# Patient Record
Sex: MALE | Race: OTHER | HISPANIC OR LATINO | URBAN - METROPOLITAN AREA
[De-identification: names, ages, dates, MRNs, and addresses within clinical notes are randomized per-mention and may not be internally consistent; named-entity substitution may affect disease eponyms.]

---

## 2018-09-15 ENCOUNTER — EMERGENCY (EMERGENCY)
Facility: HOSPITAL | Age: 83
LOS: 1 days | Discharge: HOME | End: 2018-09-15
Attending: EMERGENCY MEDICINE
Payer: MEDICARE

## 2018-09-15 VITALS
DIASTOLIC BLOOD PRESSURE: 78 MMHG | WEIGHT: 145.06 LBS | TEMPERATURE: 99 F | OXYGEN SATURATION: 98 % | SYSTOLIC BLOOD PRESSURE: 137 MMHG | RESPIRATION RATE: 16 BRPM | HEIGHT: 65 IN | HEART RATE: 100 BPM

## 2018-09-15 VITALS
DIASTOLIC BLOOD PRESSURE: 74 MMHG | RESPIRATION RATE: 18 BRPM | HEART RATE: 96 BPM | SYSTOLIC BLOOD PRESSURE: 129 MMHG | OXYGEN SATURATION: 98 % | TEMPERATURE: 98 F

## 2018-09-15 PROCEDURE — 73110 X-RAY EXAM OF WRIST: CPT | Mod: 26,RT

## 2018-09-15 PROCEDURE — 73110 X-RAY EXAM OF WRIST: CPT

## 2018-09-15 PROCEDURE — 73130 X-RAY EXAM OF HAND: CPT

## 2018-09-15 PROCEDURE — 99285 EMERGENCY DEPT VISIT HI MDM: CPT | Mod: 25

## 2018-09-15 PROCEDURE — 73130 X-RAY EXAM OF HAND: CPT | Mod: 26,RT

## 2018-09-15 PROCEDURE — 94640 AIRWAY INHALATION TREATMENT: CPT

## 2018-09-15 PROCEDURE — 99285 EMERGENCY DEPT VISIT HI MDM: CPT

## 2018-09-15 RX ORDER — IPRATROPIUM/ALBUTEROL SULFATE 18-103MCG
3 AEROSOL WITH ADAPTER (GRAM) INHALATION
Qty: 0 | Refills: 0 | Status: COMPLETED | OUTPATIENT
Start: 2018-09-15 | End: 2018-09-15

## 2018-09-15 RX ORDER — IBUPROFEN 200 MG
400 TABLET ORAL ONCE
Qty: 0 | Refills: 0 | Status: COMPLETED | OUTPATIENT
Start: 2018-09-15 | End: 2018-09-15

## 2018-09-15 RX ORDER — KETOROLAC TROMETHAMINE 30 MG/ML
30 SYRINGE (ML) INJECTION ONCE
Qty: 0 | Refills: 0 | Status: DISCONTINUED | OUTPATIENT
Start: 2018-09-15 | End: 2018-09-15

## 2018-09-15 RX ADMIN — Medication 3 MILLILITER(S): at 10:42

## 2018-09-15 RX ADMIN — Medication 400 MILLIGRAM(S): at 10:42

## 2018-09-15 RX ADMIN — Medication 40 MILLIGRAM(S): at 10:42

## 2018-09-15 RX ADMIN — Medication 3 MILLILITER(S): at 10:30

## 2018-09-15 RX ADMIN — Medication 400 MILLIGRAM(S): at 11:05

## 2018-09-15 RX ADMIN — Medication 3 MILLILITER(S): at 10:15

## 2018-09-15 NOTE — ED PROVIDER NOTE - PROGRESS NOTE DETAILS
pt claims feeling much better, Lungs- clear, Rt hand- NT to palp., d/w Dr. Crooks, will d/c to [facility]

## 2018-09-15 NOTE — ED PROVIDER NOTE - MUSCULOSKELETAL, MLM
Spine appears normal, range of motion is not limited, Rt hand- dorsal prox lat MTP-swelling, tenderness, warm to touch, mild erythema

## 2018-09-15 NOTE — ED ADULT NURSE NOTE - NSIMPLEMENTINTERV_GEN_ALL_ED
Implemented All Fall Risk Interventions:  [place] to call system. Call bell, personal items and telephone within reach. Instruct patient to call for assistance. Room bathroom lighting operational. Non-slip footwear when patient is off stretcher. Physically safe environment: no spills, clutter or unnecessary equipment. Stretcher in lowest position, wheels locked, appropriate side rails in place. Provide visual cue, wrist band, yellow gown, etc. Monitor gait and stability. Monitor for mental status changes and reorient to person, place, and time. Review medications for side effects contributing to fall risk. Reinforce activity limits and safety measures with patient and family.

## 2018-09-15 NOTE — ED PROVIDER NOTE - PMH
Anemia    Dementia    Depression    Diabetes mellitus    HTN (hypertension)    Osteoarthritis    Osteoporosis    Seizure

## 2018-10-11 ENCOUNTER — INPATIENT (INPATIENT)
Facility: HOSPITAL | Age: 83
LOS: 3 days | Discharge: NURSING, REHAB OR LONG-TERM CARE | DRG: 194 | End: 2018-10-15
Attending: INTERNAL MEDICINE | Admitting: INTERNAL MEDICINE
Payer: MEDICARE

## 2018-10-11 VITALS
OXYGEN SATURATION: 100 % | HEART RATE: 101 BPM | HEIGHT: 68 IN | TEMPERATURE: 98 F | SYSTOLIC BLOOD PRESSURE: 124 MMHG | DIASTOLIC BLOOD PRESSURE: 82 MMHG | WEIGHT: 149.91 LBS | RESPIRATION RATE: 22 BRPM

## 2018-10-11 DIAGNOSIS — R07.9 CHEST PAIN, UNSPECIFIED: ICD-10-CM

## 2018-10-11 DIAGNOSIS — D64.9 ANEMIA, UNSPECIFIED: ICD-10-CM

## 2018-10-11 DIAGNOSIS — R56.9 UNSPECIFIED CONVULSIONS: ICD-10-CM

## 2018-10-11 DIAGNOSIS — I10 ESSENTIAL (PRIMARY) HYPERTENSION: ICD-10-CM

## 2018-10-11 DIAGNOSIS — Z29.9 ENCOUNTER FOR PROPHYLACTIC MEASURES, UNSPECIFIED: ICD-10-CM

## 2018-10-11 DIAGNOSIS — E11.9 TYPE 2 DIABETES MELLITUS WITHOUT COMPLICATIONS: ICD-10-CM

## 2018-10-11 LAB
ALBUMIN SERPL ELPH-MCNC: 3 G/DL — LOW (ref 3.5–5)
ALP SERPL-CCNC: 50 U/L — SIGNIFICANT CHANGE UP (ref 40–120)
ALT FLD-CCNC: 27 U/L DA — SIGNIFICANT CHANGE UP (ref 10–60)
ANION GAP SERPL CALC-SCNC: 6 MMOL/L — SIGNIFICANT CHANGE UP (ref 5–17)
APTT BLD: 23.8 SEC — LOW (ref 27.5–37.4)
AST SERPL-CCNC: 25 U/L — SIGNIFICANT CHANGE UP (ref 10–40)
BILIRUB SERPL-MCNC: 0.4 MG/DL — SIGNIFICANT CHANGE UP (ref 0.2–1.2)
BUN SERPL-MCNC: 19 MG/DL — HIGH (ref 7–18)
CALCIUM SERPL-MCNC: 8.8 MG/DL — SIGNIFICANT CHANGE UP (ref 8.4–10.5)
CHLORIDE SERPL-SCNC: 102 MMOL/L — SIGNIFICANT CHANGE UP (ref 96–108)
CK MB BLD-MCNC: 0.7 % — SIGNIFICANT CHANGE UP (ref 0–3.5)
CK MB CFR SERPL CALC: 1.4 NG/ML — SIGNIFICANT CHANGE UP (ref 0–3.6)
CK SERPL-CCNC: 187 U/L — SIGNIFICANT CHANGE UP (ref 35–232)
CO2 SERPL-SCNC: 28 MMOL/L — SIGNIFICANT CHANGE UP (ref 22–31)
CREAT SERPL-MCNC: 1.12 MG/DL — SIGNIFICANT CHANGE UP (ref 0.5–1.3)
GLUCOSE BLDC GLUCOMTR-MCNC: 111 MG/DL — HIGH (ref 70–99)
GLUCOSE SERPL-MCNC: 90 MG/DL — SIGNIFICANT CHANGE UP (ref 70–99)
HCT VFR BLD CALC: 28.1 % — LOW (ref 39–50)
HGB BLD-MCNC: 9.2 G/DL — LOW (ref 13–17)
INR BLD: 1.29 RATIO — HIGH (ref 0.88–1.16)
LACTATE SERPL-SCNC: 0.8 MMOL/L — SIGNIFICANT CHANGE UP (ref 0.7–2)
MCHC RBC-ENTMCNC: 32.6 GM/DL — SIGNIFICANT CHANGE UP (ref 32–36)
MCHC RBC-ENTMCNC: 33.5 PG — SIGNIFICANT CHANGE UP (ref 27–34)
MCV RBC AUTO: 102.7 FL — HIGH (ref 80–100)
PLATELET # BLD AUTO: 171 K/UL — SIGNIFICANT CHANGE UP (ref 150–400)
POTASSIUM SERPL-MCNC: 4.3 MMOL/L — SIGNIFICANT CHANGE UP (ref 3.5–5.3)
POTASSIUM SERPL-SCNC: 4.3 MMOL/L — SIGNIFICANT CHANGE UP (ref 3.5–5.3)
PROT SERPL-MCNC: 7.5 G/DL — SIGNIFICANT CHANGE UP (ref 6–8.3)
PROTHROM AB SERPL-ACNC: 14.1 SEC — HIGH (ref 9.8–12.7)
RBC # BLD: 2.74 M/UL — LOW (ref 4.2–5.8)
RBC # FLD: 12.8 % — SIGNIFICANT CHANGE UP (ref 10.3–14.5)
SODIUM SERPL-SCNC: 136 MMOL/L — SIGNIFICANT CHANGE UP (ref 135–145)
TROPONIN I SERPL-MCNC: <0.015 NG/ML — SIGNIFICANT CHANGE UP (ref 0–0.04)
TROPONIN I SERPL-MCNC: <0.015 NG/ML — SIGNIFICANT CHANGE UP (ref 0–0.04)
WBC # BLD: 11.8 K/UL — HIGH (ref 3.8–10.5)
WBC # FLD AUTO: 11.8 K/UL — HIGH (ref 3.8–10.5)

## 2018-10-11 PROCEDURE — 71045 X-RAY EXAM CHEST 1 VIEW: CPT | Mod: 26

## 2018-10-11 PROCEDURE — 71275 CT ANGIOGRAPHY CHEST: CPT | Mod: 26

## 2018-10-11 PROCEDURE — 99285 EMERGENCY DEPT VISIT HI MDM: CPT

## 2018-10-11 RX ORDER — CEFTRIAXONE 500 MG/1
1 INJECTION, POWDER, FOR SOLUTION INTRAMUSCULAR; INTRAVENOUS ONCE
Qty: 0 | Refills: 0 | Status: COMPLETED | OUTPATIENT
Start: 2018-10-11 | End: 2018-10-11

## 2018-10-11 RX ORDER — CHOLECALCIFEROL (VITAMIN D3) 125 MCG
1000 CAPSULE ORAL DAILY
Qty: 0 | Refills: 0 | Status: DISCONTINUED | OUTPATIENT
Start: 2018-10-11 | End: 2018-10-15

## 2018-10-11 RX ORDER — CEFTRIAXONE 500 MG/1
1 INJECTION, POWDER, FOR SOLUTION INTRAMUSCULAR; INTRAVENOUS EVERY 24 HOURS
Qty: 0 | Refills: 0 | Status: DISCONTINUED | OUTPATIENT
Start: 2018-10-11 | End: 2018-10-15

## 2018-10-11 RX ORDER — INSULIN LISPRO 100/ML
VIAL (ML) SUBCUTANEOUS
Qty: 0 | Refills: 0 | Status: DISCONTINUED | OUTPATIENT
Start: 2018-10-11 | End: 2018-10-15

## 2018-10-11 RX ORDER — FOLIC ACID 0.8 MG
1 TABLET ORAL DAILY
Qty: 0 | Refills: 0 | Status: DISCONTINUED | OUTPATIENT
Start: 2018-10-11 | End: 2018-10-15

## 2018-10-11 RX ORDER — AZITHROMYCIN 500 MG/1
500 TABLET, FILM COATED ORAL ONCE
Qty: 0 | Refills: 0 | Status: COMPLETED | OUTPATIENT
Start: 2018-10-11 | End: 2018-10-11

## 2018-10-11 RX ORDER — AZITHROMYCIN 500 MG/1
500 TABLET, FILM COATED ORAL EVERY 24 HOURS
Qty: 0 | Refills: 0 | Status: DISCONTINUED | OUTPATIENT
Start: 2018-10-11 | End: 2018-10-15

## 2018-10-11 RX ORDER — ASPIRIN/CALCIUM CARB/MAGNESIUM 324 MG
81 TABLET ORAL DAILY
Qty: 0 | Refills: 0 | Status: DISCONTINUED | OUTPATIENT
Start: 2018-10-11 | End: 2018-10-15

## 2018-10-11 RX ORDER — LOSARTAN POTASSIUM 100 MG/1
25 TABLET, FILM COATED ORAL DAILY
Qty: 0 | Refills: 0 | Status: DISCONTINUED | OUTPATIENT
Start: 2018-10-11 | End: 2018-10-15

## 2018-10-11 RX ORDER — LEVETIRACETAM 250 MG/1
500 TABLET, FILM COATED ORAL
Qty: 0 | Refills: 0 | Status: DISCONTINUED | OUTPATIENT
Start: 2018-10-11 | End: 2018-10-15

## 2018-10-11 RX ORDER — SIMVASTATIN 20 MG/1
10 TABLET, FILM COATED ORAL AT BEDTIME
Qty: 0 | Refills: 0 | Status: DISCONTINUED | OUTPATIENT
Start: 2018-10-11 | End: 2018-10-15

## 2018-10-11 RX ORDER — CEFTRIAXONE 500 MG/1
1 INJECTION, POWDER, FOR SOLUTION INTRAMUSCULAR; INTRAVENOUS EVERY 24 HOURS
Qty: 0 | Refills: 0 | Status: DISCONTINUED | OUTPATIENT
Start: 2018-10-11 | End: 2018-10-12

## 2018-10-11 RX ORDER — ASPIRIN/CALCIUM CARB/MAGNESIUM 324 MG
162 TABLET ORAL ONCE
Qty: 0 | Refills: 0 | Status: COMPLETED | OUTPATIENT
Start: 2018-10-11 | End: 2018-10-11

## 2018-10-11 RX ORDER — METRONIDAZOLE 500 MG
500 TABLET ORAL EVERY 8 HOURS
Qty: 0 | Refills: 0 | Status: DISCONTINUED | OUTPATIENT
Start: 2018-10-11 | End: 2018-10-12

## 2018-10-11 RX ORDER — ALBUTEROL 90 UG/1
2 AEROSOL, METERED ORAL EVERY 6 HOURS
Qty: 0 | Refills: 0 | Status: DISCONTINUED | OUTPATIENT
Start: 2018-10-11 | End: 2018-10-15

## 2018-10-11 RX ORDER — FERROUS SULFATE 325(65) MG
325 TABLET ORAL DAILY
Qty: 0 | Refills: 0 | Status: DISCONTINUED | OUTPATIENT
Start: 2018-10-11 | End: 2018-10-15

## 2018-10-11 RX ADMIN — AZITHROMYCIN 500 MILLIGRAM(S): 500 TABLET, FILM COATED ORAL at 17:37

## 2018-10-11 RX ADMIN — Medication 100 MILLIGRAM(S): at 23:12

## 2018-10-11 RX ADMIN — SIMVASTATIN 10 MILLIGRAM(S): 20 TABLET, FILM COATED ORAL at 23:13

## 2018-10-11 RX ADMIN — Medication 162 MILLIGRAM(S): at 11:54

## 2018-10-11 RX ADMIN — CEFTRIAXONE 100 GRAM(S): 500 INJECTION, POWDER, FOR SOLUTION INTRAMUSCULAR; INTRAVENOUS at 17:37

## 2018-10-11 NOTE — ED ADULT NURSE NOTE - CHIEF COMPLAINT QUOTE
BIBA by ambulance from [facility] with c/o chest pain with c/o chest pain radiating to left arm with shortness of breath since morning.

## 2018-10-11 NOTE — ED ADULT NURSE NOTE - NSIMPLEMENTINTERV_GEN_ALL_ED
Implemented All Fall Risk Interventions:  [place] to call system. Call bell, personal items and telephone within reach. Instruct patient to call for assistance. Room bathroom lighting operational. Non-slip footwear when patient is off stretcher. Physically safe environment: no spills, clutter or unnecessary equipment. Stretcher in lowest position, wheels locked, appropriate side rails in place. Provide visual cue, wrist band, yellow gown, etc. Monitor gait and stability. Monitor for mental status changes and reorient to person, place, and time. Review medications for side effects contributing to fall risk. Reinforce activity limits and safety measures with patient and family.

## 2018-10-11 NOTE — ED PROVIDER NOTE - SEVERE SEPSIS ALERT DETAILS
will send blood cx and lactate given that pt has poss pna on ct scan though likely not pna clinically. abx coverage ordered.

## 2018-10-11 NOTE — H&P ADULT - COMMENTS
I called patient with results of strep culture of throat.  It was also negative. He is a little better but not a lot.  I told him that he should be much better with the antibiotics if it was strep, so it is probably a viral infection.  He should stop the antibiotics and continue with supportive care as recommended before.    Unable to obtain 2/2 mental status

## 2018-10-11 NOTE — ED ADULT NURSE NOTE - OBJECTIVE STATEMENT
Pt was sent from [facility] for c/o chest pain, radiating to Left arm since this morning, and difficulty breathing

## 2018-10-11 NOTE — H&P ADULT - NSHPPHYSICALEXAM_GEN_ALL_CORE
T(C): 36.7 (11 Oct 2018 15:56), Max: 36.7 (11 Oct 2018 15:56)  T(F): 98 (11 Oct 2018 15:56), Max: 98 (11 Oct 2018 15:56)  HR: 102 (11 Oct 2018 15:56) (101 - 102)  BP: 122/80 (11 Oct 2018 15:56) (122/80 - 124/82)  RR: 20 (11 Oct 2018 15:56) (20 - 22)  SpO2: 99% (11 Oct 2018 15:56) (99% - 100%)

## 2018-10-11 NOTE — ED PROVIDER NOTE - OBJECTIVE STATEMENT
82 y/o M with PMHx of anemia, dementia, depression, DM, HTN and no significant PSHx presents to the ED from [facility] with complaints of left sided chest pain. Patient is a [place] speaker and  phone was utilized. Challenging to obtain history as patient with questionable dysarthria. Patient with intermittent left sided chest pain x 1 day. Patient was observed to be coughing. Patient was brought on O2 by EMS. As per report from EMS patient with chest pain to rule out ACS. NKDA.

## 2018-10-11 NOTE — H&P ADULT - ASSESSMENT
83 year old  male w/ PMH of Dementia, COPD, HTN, PVD, T2DM, and Urinary Incontinence presents from [facility] for further evaluation of chest pain and reported LUE pain - patient poor historian, [place] (but usually knows where, unable to provider further HPI. In the ED patient seen in NAD coughing nonproductive at bedside - ED w/u included CTA negative for PE but noted b/l upper lobes, lingula and RML opacities concerning for infectious cause and XR of the LUE negative for acute fracture. Patient received dose of Azithromycin and Ceftriaxone along w/ 162 mg of ASA. Spoke w/ HCP Akiko Moon - updated about patient's status; DNR but okay with intubation. Family denied Hx of TB infection and came from [facility] years ago -  phone number . 83 year old  male w/ PMH of Dementia, COPD, HTN, PVD, T2DM, and Urinary Incontinence presents from [facility] for further evaluation of chest pain and reported LUE pain - admitting for further evaluation w/ differentials including PNA, ACS, and malignancy

## 2018-10-11 NOTE — ED ADULT TRIAGE NOTE - CHIEF COMPLAINT QUOTE
BIBA by ambulance from [facility] with c/o chest pain with c/o chest pain radiating to left arm with shortness of breath since morning.

## 2018-10-11 NOTE — H&P ADULT - HISTORY OF PRESENT ILLNESS
83 year old  male w/ PMH of Dementia, COPD, HTN, PVD, T2DM, and Urinary Incontinence presents from [facility] for further evaluation of chest pain and reported LUE pain - patient poor historian, [place] (but usually knows where, unable to provider further HPI. In the ED patient seen in NAD coughing nonproductive at bedside - ED w/u included CTA negative for PE but noted b/l upper lobes, lingula and RML opacities concerning for infectious cause and XR of the LUE negative for acute fracture. Patient received dose of Azithromycin and Ceftriaxone along w/ 162 mg of ASA. Spoke w/ HCP Akiko Moon - updated about patient's status; DNR but okay with intubation. Family denied Hx of TB infection and came from [facility] 40 years ago -  phone number .

## 2018-10-11 NOTE — H&P ADULT - PROBLEM SELECTOR PLAN 1
Differentials include ACS versus PNA  - Afebrile, mild WBC elevation, no focal findings on examination  - CTA negative for PE but shows b/l upper lobe nodules concerning for infection  - EKG w/ NSR w/ RBBB and TWI at V1-3 (similar to EKG 2016) and Troponin negative x 1; C/w ASA, Statin, and low dose beta blocker  Cardiology Dr Ordonez  Pulmonary Dr Marcelino  ID Dr Monson  ***F/u serial cardiac enzymes, TTE, procalcitonin, and further recommendations pending above

## 2018-10-12 DIAGNOSIS — I71.4 ABDOMINAL AORTIC ANEURYSM, WITHOUT RUPTURE: ICD-10-CM

## 2018-10-12 LAB
ANION GAP SERPL CALC-SCNC: 10 MMOL/L — SIGNIFICANT CHANGE UP (ref 5–17)
ANISOCYTOSIS BLD QL: SLIGHT — SIGNIFICANT CHANGE UP
BASOPHILS NFR BLD AUTO: 1 % — SIGNIFICANT CHANGE UP (ref 0–2)
BUN SERPL-MCNC: 18 MG/DL — SIGNIFICANT CHANGE UP (ref 7–18)
CALCIUM SERPL-MCNC: 8.9 MG/DL — SIGNIFICANT CHANGE UP (ref 8.4–10.5)
CHLORIDE SERPL-SCNC: 103 MMOL/L — SIGNIFICANT CHANGE UP (ref 96–108)
CHOLEST SERPL-MCNC: 87 MG/DL — SIGNIFICANT CHANGE UP (ref 10–199)
CK MB BLD-MCNC: 0.9 % — SIGNIFICANT CHANGE UP (ref 0–3.5)
CK MB CFR SERPL CALC: 1.5 NG/ML — SIGNIFICANT CHANGE UP (ref 0–3.6)
CK SERPL-CCNC: 170 U/L — SIGNIFICANT CHANGE UP (ref 35–232)
CO2 SERPL-SCNC: 24 MMOL/L — SIGNIFICANT CHANGE UP (ref 22–31)
CREAT SERPL-MCNC: 0.97 MG/DL — SIGNIFICANT CHANGE UP (ref 0.5–1.3)
FERRITIN SERPL-MCNC: 540 NG/ML — HIGH (ref 30–400)
FOLATE SERPL-MCNC: >20 NG/ML — SIGNIFICANT CHANGE UP
GLUCOSE BLDC GLUCOMTR-MCNC: 108 MG/DL — HIGH (ref 70–99)
GLUCOSE BLDC GLUCOMTR-MCNC: 115 MG/DL — HIGH (ref 70–99)
GLUCOSE BLDC GLUCOMTR-MCNC: 126 MG/DL — HIGH (ref 70–99)
GLUCOSE BLDC GLUCOMTR-MCNC: 99 MG/DL — SIGNIFICANT CHANGE UP (ref 70–99)
GLUCOSE SERPL-MCNC: 95 MG/DL — SIGNIFICANT CHANGE UP (ref 70–99)
HBA1C BLD-MCNC: 6 % — HIGH (ref 4–5.6)
HCT VFR BLD CALC: 27.7 % — LOW (ref 39–50)
HDLC SERPL-MCNC: 35 MG/DL — LOW
HGB BLD-MCNC: 9.1 G/DL — LOW (ref 13–17)
IRON SATN MFR SERPL: 18 % — LOW (ref 20–55)
IRON SATN MFR SERPL: 31 UG/DL — LOW (ref 65–170)
LIPID PNL WITH DIRECT LDL SERPL: 40 MG/DL — SIGNIFICANT CHANGE UP
LYMPHOCYTES # BLD AUTO: 24 % — SIGNIFICANT CHANGE UP (ref 13–44)
MAGNESIUM SERPL-MCNC: 2.5 MG/DL — SIGNIFICANT CHANGE UP (ref 1.6–2.6)
MCHC RBC-ENTMCNC: 32.9 GM/DL — SIGNIFICANT CHANGE UP (ref 32–36)
MCHC RBC-ENTMCNC: 33.6 PG — SIGNIFICANT CHANGE UP (ref 27–34)
MCV RBC AUTO: 102.2 FL — HIGH (ref 80–100)
METAMYELOCYTES # FLD: 1 % — HIGH (ref 0–0)
MICROCYTES BLD QL: SLIGHT — SIGNIFICANT CHANGE UP
MONOCYTES NFR BLD AUTO: 21 % — HIGH (ref 2–14)
MYELOCYTES NFR BLD: 1 % — HIGH (ref 0–0)
NEUTROPHILS NFR BLD AUTO: 44 % — SIGNIFICANT CHANGE UP (ref 43–77)
NEUTS BAND # BLD: 6 % — SIGNIFICANT CHANGE UP (ref 0–8)
OVALOCYTES BLD QL SMEAR: SLIGHT — SIGNIFICANT CHANGE UP
PHOSPHATE SERPL-MCNC: 3.5 MG/DL — SIGNIFICANT CHANGE UP (ref 2.5–4.5)
PLAT MORPH BLD: NORMAL — SIGNIFICANT CHANGE UP
PLATELET # BLD AUTO: 177 K/UL — SIGNIFICANT CHANGE UP (ref 150–400)
POIKILOCYTOSIS BLD QL AUTO: SLIGHT — SIGNIFICANT CHANGE UP
POLYCHROMASIA BLD QL SMEAR: SLIGHT — SIGNIFICANT CHANGE UP
POTASSIUM SERPL-MCNC: 4 MMOL/L — SIGNIFICANT CHANGE UP (ref 3.5–5.3)
POTASSIUM SERPL-SCNC: 4 MMOL/L — SIGNIFICANT CHANGE UP (ref 3.5–5.3)
PROCALCITONIN SERPL-MCNC: 0.12 NG/ML — HIGH (ref 0.02–0.1)
RBC # BLD: 2.71 M/UL — LOW (ref 4.2–5.8)
RBC # FLD: 12.7 % — SIGNIFICANT CHANGE UP (ref 10.3–14.5)
RBC BLD AUTO: ABNORMAL
SODIUM SERPL-SCNC: 137 MMOL/L — SIGNIFICANT CHANGE UP (ref 135–145)
SPHEROCYTES BLD QL SMEAR: SLIGHT — SIGNIFICANT CHANGE UP
TIBC SERPL-MCNC: 175 UG/DL — LOW (ref 250–450)
TOTAL CHOLESTEROL/HDL RATIO MEASUREMENT: 2.5 RATIO — LOW (ref 3.4–9.6)
TRIGL SERPL-MCNC: 58 MG/DL — SIGNIFICANT CHANGE UP (ref 10–149)
TROPONIN I SERPL-MCNC: <0.015 NG/ML — SIGNIFICANT CHANGE UP (ref 0–0.04)
TSH SERPL-MCNC: 1.04 UU/ML — SIGNIFICANT CHANGE UP (ref 0.34–4.82)
UIBC SERPL-MCNC: 144 UG/DL — SIGNIFICANT CHANGE UP (ref 110–370)
VARIANT LYMPHS # BLD: 2 % — SIGNIFICANT CHANGE UP (ref 0–6)
VIT B12 SERPL-MCNC: 803 PG/ML — SIGNIFICANT CHANGE UP (ref 232–1245)
WBC # BLD: 9.1 K/UL — SIGNIFICANT CHANGE UP (ref 3.8–10.5)
WBC # FLD AUTO: 9.1 K/UL — SIGNIFICANT CHANGE UP (ref 3.8–10.5)

## 2018-10-12 PROCEDURE — 93306 TTE W/DOPPLER COMPLETE: CPT | Mod: 26

## 2018-10-12 PROCEDURE — 99221 1ST HOSP IP/OBS SF/LOW 40: CPT

## 2018-10-12 RX ORDER — METOPROLOL TARTRATE 50 MG
25 TABLET ORAL DAILY
Qty: 0 | Refills: 0 | Status: DISCONTINUED | OUTPATIENT
Start: 2018-10-12 | End: 2018-10-15

## 2018-10-12 RX ADMIN — Medication 81 MILLIGRAM(S): at 12:22

## 2018-10-12 RX ADMIN — LOSARTAN POTASSIUM 25 MILLIGRAM(S): 100 TABLET, FILM COATED ORAL at 06:13

## 2018-10-12 RX ADMIN — Medication 10 MILLIGRAM(S): at 12:22

## 2018-10-12 RX ADMIN — CEFTRIAXONE 100 GRAM(S): 500 INJECTION, POWDER, FOR SOLUTION INTRAMUSCULAR; INTRAVENOUS at 17:53

## 2018-10-12 RX ADMIN — LEVETIRACETAM 500 MILLIGRAM(S): 250 TABLET, FILM COATED ORAL at 06:13

## 2018-10-12 RX ADMIN — SIMVASTATIN 10 MILLIGRAM(S): 20 TABLET, FILM COATED ORAL at 21:19

## 2018-10-12 RX ADMIN — Medication 100 MILLIGRAM(S): at 06:13

## 2018-10-12 RX ADMIN — Medication 325 MILLIGRAM(S): at 12:22

## 2018-10-12 RX ADMIN — Medication 25 MILLIGRAM(S): at 21:19

## 2018-10-12 RX ADMIN — AZITHROMYCIN 250 MILLIGRAM(S): 500 TABLET, FILM COATED ORAL at 17:54

## 2018-10-12 RX ADMIN — Medication 1 MILLIGRAM(S): at 12:22

## 2018-10-12 RX ADMIN — LEVETIRACETAM 500 MILLIGRAM(S): 250 TABLET, FILM COATED ORAL at 17:53

## 2018-10-12 RX ADMIN — Medication 1000 UNIT(S): at 12:22

## 2018-10-12 NOTE — CONSULT NOTE ADULT - SUBJECTIVE AND OBJECTIVE BOX
Vascular Surgery Consultation Note    Patient is a 83y old  Male who presents with a chief complaint of chest pain and cough (12 Oct 2018 10:46)      HPI:  83 year old  M PMH of Dementia, COPD, HTN, PVD, T2DM, and Urinary Incontinence admitted for chest pain and reported LUE pain, found to have infrarenal abdominal aortic aneursym on CT.  Pt had CT in 2016 showing same aneursym which was 3.6cm at the time and now 4.2cm. Pt is poor historian and unable to provide history.  Pt denies abdominal pain, pain in bilateral lower extremities. PT unable to provide further history.      PAST MEDICAL & SURGICAL HISTORY:  Diabetes mellitus  Osteoporosis  Osteoarthritis  HTN (hypertension)  Depression  Anemia  Seizure  Dementia  No significant past surgical history      Allergies    No Known Allergies    MEDICATIONS  (STANDING):  aspirin enteric coated 81 milliGRAM(s) Oral daily  azithromycin  IVPB 500 milliGRAM(s) IV Intermittent every 24 hours  cefTRIAXone   IVPB 1 Gram(s) IV Intermittent every 24 hours  cholecalciferol 1000 Unit(s) Oral daily  ferrous    sulfate 325 milliGRAM(s) Oral daily  folic acid 1 milliGRAM(s) Oral daily  insulin lispro (HumaLOG) corrective regimen sliding scale   SubCutaneous Before meals and at bedtime  levETIRAcetam 500 milliGRAM(s) Oral two times a day  losartan 25 milliGRAM(s) Oral daily  metoprolol succinate ER 25 milliGRAM(s) Oral daily  PARoxetine 10 milliGRAM(s) Oral daily  simvastatin 10 milliGRAM(s) Oral at bedtime    MEDICATIONS  (PRN):  ALBUTerol    90 MICROgram(s) HFA Inhaler 2 Puff(s) Inhalation every 6 hours PRN Shortness of Breath and/or Wheezing      Vital Signs Last 24 Hrs  T(C): 36.9 (12 Oct 2018 16:08), Max: 37.1 (12 Oct 2018 04:48)  T(F): 98.5 (12 Oct 2018 16:08), Max: 98.8 (12 Oct 2018 04:48)  HR: 92 (12 Oct 2018 16:08) (88 - 99)  BP: 114/57 (12 Oct 2018 16:08) (109/47 - 131/56)  BP(mean): --  RR: 18 (12 Oct 2018 16:08) (18 - 20)  SpO2: 96% (12 Oct 2018 16:08) (96% - 100%)    Physical Exam:  Gen: awake, alert oriented NAD  Abd: soft NT ND, no pulsatile mass  Ext: warm to touch, no calf tenderness, no lesions on toes/foot  Vasc: good capillary refill, palpable DP/PT/popliteal/femoral pulses    Labs:                          9.1    9.1   )-----------( 177      ( 12 Oct 2018 07:10 )             27.7     10-12    137  |  103  |  18  ----------------------------<  95  4.0   |  24  |  0.97    Ca    8.9      12 Oct 2018 07:10  Phos  3.5     10-12  Mg     2.5     10-12    TPro  7.5  /  Alb  3.0<L>  /  TBili  0.4  /  DBili  x   /  AST  25  /  ALT  27  /  AlkPhos  50  10-11    PT/INR - ( 11 Oct 2018 12:34 )   PT: 14.1 sec;   INR: 1.29 ratio         PTT - ( 11 Oct 2018 12:34 )  PTT:23.8 sec      Radiological Exams:  < from: CT Angio Chest w/ IV Cont (10.11.18 @ 13:51) >  IMPRESSION:   1.  No pulmonary embolism.  2.  Bilateral upper lobe, lingular, and right middle lobe predominant   tree-in-bud opacities and consolidations, likely infectious in etiology.   Differential considerations include atypical infection such as JAMESON.  3.  Interval increase in size of partially imaged infrarenal abdominal   aortic aneurysm, which currently measures 5.2 cm, previously 4.6 cm. This   may be due to differences in technique. Consider dedicated abdominal   imaging if clinically necessary.      < end of copied text >

## 2018-10-12 NOTE — CONSULT NOTE ADULT - SUBJECTIVE AND OBJECTIVE BOX
Time of visit:    CHIEF COMPLAINT: Patient is a 83y old  Male who presents with a chief complaint of chest pain and cough (12 Oct 2018 16:21)      HPI:  83 year old  male w/ PMH of Dementia, COPD, HTN, PVD, T2DM, and Urinary Incontinence presents from [facility] for further evaluation of chest pain and reported LUE pain - patient poor historian, [place] (but usually knows where, unable to provider further HPI. In the ED patient seen in NAD coughing nonproductive at bedside - ED w/u included CTA negative for PE but noted b/l upper lobes, lingula and RML opacities concerning for infectious cause and XR of the LUE negative for acute fracture. Patient received dose of Azithromycin and Ceftriaxone along w/ 162 mg of ASA. Spoke w/ HCP Akiko Moon - updated about patient's status; DNR but okay with intubation. Family denied Hx of TB infection and came from [facility] 40 years ago -  phone number . (11 Oct 2018 17:48)   Patient seen and examined.     PAST MEDICAL & SURGICAL HISTORY:  Diabetes mellitus  Osteoporosis  Osteoarthritis  HTN (hypertension)  Depression  Anemia  Seizure  Dementia  No significant past surgical history      Allergies    No Known Allergies    Intolerances        MEDICATIONS  (STANDING):  aspirin enteric coated 81 milliGRAM(s) Oral daily  azithromycin  IVPB 500 milliGRAM(s) IV Intermittent every 24 hours  cefTRIAXone   IVPB 1 Gram(s) IV Intermittent every 24 hours  cholecalciferol 1000 Unit(s) Oral daily  ferrous    sulfate 325 milliGRAM(s) Oral daily  folic acid 1 milliGRAM(s) Oral daily  insulin lispro (HumaLOG) corrective regimen sliding scale   SubCutaneous Before meals and at bedtime  levETIRAcetam 500 milliGRAM(s) Oral two times a day  losartan 25 milliGRAM(s) Oral daily  metoprolol succinate ER 25 milliGRAM(s) Oral daily  PARoxetine 10 milliGRAM(s) Oral daily  simvastatin 10 milliGRAM(s) Oral at bedtime      MEDICATIONS  (PRN):  ALBUTerol    90 MICROgram(s) HFA Inhaler 2 Puff(s) Inhalation every 6 hours PRN Shortness of Breath and/or Wheezing   Medications up to date at time of exam.    Medications up to date at time of exam.    FAMILY HISTORY:  No pertinent family history in first degree relatives      SOCIAL HISTORY  Smoking History: [   ] smoking/smoke exposure, [   ] former smoker  Living Condition: [   ] apartment, [   ] private house  Work History:   Travel History: denies recent travel  Illicit Substance Use: denies  Alcohol Use: denies    REVIEW OF SYSTEMS:    CONSTITUTIONAL:  denies fevers, chills, sweats, weight loss    HEENT:  denies diplopia or blurred vision, sore throat or runny nose.    CARDIOVASCULAR:  denies pressure, squeezing, tightness, or heaviness about the chest; no palpitations.    RESPIRATORY:  denies SOB, cough, OLIVO, wheezing.    GASTROINTESTINAL:  denies abdominal pain, nausea, vomiting or diarrhea.    GENITOURINARY: denies dysuria, frequency or urgency.    NEUROLOGIC:  denies numbness, tingling, seizures or weakness.    PSYCHIATRIC:  denies disorder of thought or mood.    MSK: denies swelling, redness      PHYSICAL EXAMINATION:    GENERAL: The patient is a well-developed, well-nourished, in no apparent distress.     Vital Signs Last 24 Hrs  T(C): 36.9 (12 Oct 2018 16:08), Max: 37.1 (12 Oct 2018 04:48)  T(F): 98.5 (12 Oct 2018 16:08), Max: 98.8 (12 Oct 2018 04:48)  HR: 92 (12 Oct 2018 16:08) (88 - 99)  BP: 114/57 (12 Oct 2018 16:08) (109/47 - 131/56)  BP(mean): --  RR: 18 (12 Oct 2018 16:08) (18 - 20)  SpO2: 96% (12 Oct 2018 16:08) (96% - 100%)   (if applicable)    Chest Tube (if applicable)    HEENT: Head is normocephalic and atraumatic. Extraocular muscles are intact. Mucous membranes are moist.     NECK: Supple, no palpable adenopathy.    LUNGS: Clear to auscultation, no wheezing, rales, or rhonchi.    HEART: Regular rate and rhythm without murmur.    ABDOMEN: Soft, nontender, and nondistended.  No hepatosplenomegaly is noted.    RENAL: No difficulty voiding, no pelvic pain    EXTREMITIES: Without any cyanosis, clubbing, rash, lesions or edema.    NEUROLOGIC: Awake, alert, oriented, grossly intact    SKIN: Warm, dry, good turgor.      LABS:                        9.1    9.1   )-----------( 177      ( 12 Oct 2018 07:10 )             27.7     10-12    137  |  103  |  18  ----------------------------<  95  4.0   |  24  |  0.97    Ca    8.9      12 Oct 2018 07:10  Phos  3.5     10-12  Mg     2.5     10-12    TPro  7.5  /  Alb  3.0<L>  /  TBili  0.4  /  DBili  x   /  AST  25  /  ALT  27  /  AlkPhos  50  10-11    PT/INR - ( 11 Oct 2018 12:34 )   PT: 14.1 sec;   INR: 1.29 ratio         PTT - ( 11 Oct 2018 12:34 )  PTT:23.8 sec      CARDIAC MARKERS ( 12 Oct 2018 07:10 )  <0.015 ng/mL / x     / 170 U/L / x     / 1.5 ng/mL  CARDIAC MARKERS ( 11 Oct 2018 20:21 )  <0.015 ng/mL / x     / 187 U/L / x     / 1.4 ng/mL  CARDIAC MARKERS ( 11 Oct 2018 12:34 )  <0.015 ng/mL / x     / x     / x     / x              Lactate, Blood: 0.8 mmol/L (10-11-18 @ 19:18)    Procalcitonin, Serum: 0.12 ng/mL (10-12-18 @ 10:39)      MICROBIOLOGY: (if applicable)    RADIOLOGY & ADDITIONAL STUDIES:  EKG:   CXR:  ECHO:    IMPRESSION: 83y Male PAST MEDICAL & SURGICAL HISTORY:  Diabetes mellitus  Osteoporosis  Osteoarthritis  HTN (hypertension)  Depression  Anemia  Seizure  Dementia  No significant past surgical history   p/w                   RECOMMENDATIONS:

## 2018-10-12 NOTE — PROGRESS NOTE ADULT - SUBJECTIVE AND OBJECTIVE BOX
PGY1 Note discussed with supervising resident and primary attending.    Patient is a 83y old  Male who presents with a chief complaint of chest pain and cough (12 Oct 2018 10:46)      INTERVAL HPI/OVERNIGHT EVENTS: no overnight events. Patient assessed bedside, AAOx2 has no acute complaints but it is difficult to obtain ROS.    MEDICATIONS  (STANDING):  aspirin enteric coated 81 milliGRAM(s) Oral daily  azithromycin  IVPB 500 milliGRAM(s) IV Intermittent every 24 hours  cefTRIAXone   IVPB 1 Gram(s) IV Intermittent every 24 hours  cholecalciferol 1000 Unit(s) Oral daily  ferrous    sulfate 325 milliGRAM(s) Oral daily  folic acid 1 milliGRAM(s) Oral daily  insulin lispro (HumaLOG) corrective regimen sliding scale   SubCutaneous Before meals and at bedtime  levETIRAcetam 500 milliGRAM(s) Oral two times a day  losartan 25 milliGRAM(s) Oral daily  metoprolol succinate ER 25 milliGRAM(s) Oral daily  PARoxetine 10 milliGRAM(s) Oral daily  simvastatin 10 milliGRAM(s) Oral at bedtime    MEDICATIONS  (PRN):  ALBUTerol    90 MICROgram(s) HFA Inhaler 2 Puff(s) Inhalation every 6 hours PRN Shortness of Breath and/or Wheezing      Allergies    No Known Allergies    Intolerances            Vital Signs Last 24 Hrs  T(C): 36.9 (12 Oct 2018 16:08), Max: 37.1 (12 Oct 2018 04:48)  T(F): 98.5 (12 Oct 2018 16:08), Max: 98.8 (12 Oct 2018 04:48)  HR: 92 (12 Oct 2018 16:08) (88 - 99)  BP: 114/57 (12 Oct 2018 16:08) (109/47 - 131/56)  BP(mean): --  RR: 18 (12 Oct 2018 16:08) (18 - 20)  SpO2: 96% (12 Oct 2018 16:08) (96% - 100%)    PHYSICAL EXAM:  GENERAL: NAD, well-groomed, well-developed  HEAD:  Atraumatic, Normocephalic  EYES: EOMI, PERRLA, conjunctiva and sclera clear  NECK: Supple, No JVD, Normal thyroid  CHEST/LUNG: bilateral rhonchi  HEART: Regular rate and rhythm; No murmurs, rubs, or gallops  ABDOMEN: Soft, Nontender, Nondistended; Bowel sounds present  NERVOUS SYSTEM:  Alert & Oriented X2,  Motor Strength 3/5 LUE, 5/5 RUE  EXTREMITIES:  2+ Peripheral Pulses, No clubbing, cyanosis, or edema    LABS:                        9.1    9.1   )-----------( 177      ( 12 Oct 2018 07:10 )             27.7     10-12    137  |  103  |  18  ----------------------------<  95  4.0   |  24  |  0.97    Ca    8.9      12 Oct 2018 07:10  Phos  3.5     10-12  Mg     2.5     10-12    TPro  7.5  /  Alb  3.0<L>  /  TBili  0.4  /  DBili  x   /  AST  25  /  ALT  27  /  AlkPhos  50  10-11    PT/INR - ( 11 Oct 2018 12:34 )   PT: 14.1 sec;   INR: 1.29 ratio         PTT - ( 11 Oct 2018 12:34 )  PTT:23.8 sec    CAPILLARY BLOOD GLUCOSE      POCT Blood Glucose.: 126 mg/dL (12 Oct 2018 11:51)  POCT Blood Glucose.: 99 mg/dL (12 Oct 2018 07:51)  POCT Blood Glucose.: 111 mg/dL (11 Oct 2018 22:27)      RADIOLOGY & ADDITIONAL TESTS:    PROCEDURE:   CT of the Chest was performed without intravenous contrast.  Sagittal and coronal reformats were performed.      FINDINGS:    CHEST:     LUNGS AND LARGE AIRWAYS: Patent central airways. Bilateral upper lobe,   lingular, and right middle lobe predominant tree-in-bud opacities and   patchy consolidations.  PLEURA: Biapical pleural-parenchymal scarring   VESSELS: Good opacification of the pulmonary arterial tree. No pulmonary   embolism.   HEART: Heart size is normal.No pericardial effusion.  MEDIASTINUM AND FUAD: No lymphadenopathy.  CHEST WALL AND LOWER NECK: Within normal limits.  VISUALIZED UPPER ABDOMEN: Partially imaged infrarenal abdominal aortic   aneurysm measures 5.2 cm, previously 4.6 cm. Mildly thickened left   adrenal gland.  BONES: Sclerosis and cortical thickening at the posterior right eighth   rib, unchanged. Chronic fracture deformity of the left scapula. Mild   compression deformities of T3, T8, and T9, unchanged from 10/14/2016.    IMPRESSION:   1.  No pulmonary embolism.  2.  Bilateral upper lobe, lingular, and right middle lobe predominant   tree-in-bud opacities and consolidations, likely infectious in etiology.   Differential considerations include atypical infection such as JAMESON.  3.  Interval increase in size of partially imaged infrarenal abdominal   aortic aneurysm, which currently measures 5.2 cm, previously 4.6 cm. This   may be due to differences in technique. Consider dedicated abdominal   imaging if clinically necessary.

## 2018-10-12 NOTE — PROGRESS NOTE ADULT - PROBLEM SELECTOR PLAN 5
Holding home antidiabetic medications; c/w diabetic diet and ISS   A1C 6,0  -monitor FS BP acceptable; c/w Cozaar

## 2018-10-12 NOTE — CONSULT NOTE ADULT - ATTENDING COMMENTS
Seen ex'ed w PA, agree w most above.  Incidental AAA on CTC- reviewed: partial imaged AAA, inc in size from 4.8cm (2016) to 5.2cm now.  Pt poor historian.  No previous documented referrences to AAA in [place].    Appears comfortable  ABd: S/NT, no sig pulsatility  Ext's well perfused w palp periph pulses.  fem/pop pulses not widened.    A/P:   AAA  asymp  Partially imaged  Inc size since 2016  Unable to communicate w pt.  Tel message left for relative at [phone] number    Rec:   Please help assess pt's GOC's  If pt is a candidate for aggressive mgmt, then will need CTA A/P to eval options for poss AAA intervention.  Screen direct relatives for aneurysms.

## 2018-10-12 NOTE — PROGRESS NOTE ADULT - PROBLEM SELECTOR PLAN 2
H&H stable, baseline 9-10  - Anemia panel s/o AOCD CTA shows infrarenal AAA increased to 5.2cm from previous reading of 4.6cm  -vascular surgery consulted

## 2018-10-12 NOTE — CONSULT NOTE ADULT - SUBJECTIVE AND OBJECTIVE BOX
HISTORY OF PRESENT ILLNESS: HPI:  83 year old  male w/ PMH of Dementia, COPD, HTN, PVD, T2DM, and Urinary Incontinence presents from [facility] for further evaluation of chest pain and reported LUE pain - patient poor historian, [place] (but usually knows where, unable to provider further HPI. In the ED patient seen in NAD coughing nonproductive at bedside - ED w/u included CTA negative for PE but noted b/l upper lobes, lingula and RML opacities concerning for infectious cause and XR of the LUE negative for acute fracture. Patient received dose of Azithromycin and Ceftriaxone along w/ 162 mg of ASA. Spoke w/ HCP Akiko Moon - updated about patient's status; DNR but okay with intubation. Family denied Hx of TB infection and came from [place] 40 years ago -  phone number . (11 Oct 2018 17:48)  HE is currently denying CP      PAST MEDICAL & SURGICAL HISTORY:  Diabetes mellitus  Osteoporosis  Osteoarthritis  HTN (hypertension)  Depression  Anemia  Seizure  Dementia  No significant past surgical history          MEDICATIONS:  MEDICATIONS  (STANDING):  aspirin enteric coated 81 milliGRAM(s) Oral daily  azithromycin  IVPB 500 milliGRAM(s) IV Intermittent every 24 hours  cefTRIAXone   IVPB 1 Gram(s) IV Intermittent every 24 hours  cholecalciferol 1000 Unit(s) Oral daily  ferrous    sulfate 325 milliGRAM(s) Oral daily  folic acid 1 milliGRAM(s) Oral daily  insulin lispro (HumaLOG) corrective regimen sliding scale   SubCutaneous Before meals and at bedtime  levETIRAcetam 500 milliGRAM(s) Oral two times a day  losartan 25 milliGRAM(s) Oral daily  PARoxetine 10 milliGRAM(s) Oral daily  simvastatin 10 milliGRAM(s) Oral at bedtime      Allergies    No Known Allergies    Intolerances        FAMILY HISTORY:  No pertinent family history in first degree relatives    Non-contributary for premature coronary disease or sudden cardiac death    SOCIAL HISTORY:    [ ] Non-smoker  [ ] Smoker  [ ] Alcohol      REVIEW OF SYSTEMS:  [ ]chest pain  [  ]shortness of breath  [  ]palpitations  [  ]syncope  [ ]near syncope [ ]upper extremity weakness   [ ] lower extremity weakness  [  ]diplopia  [  ]altered mental status   [  ]fevers  [ ]chills [ ]nausea  [ ]vomitting  [  ]dysphagia    [ ]abdominal pain  [ ]melena  [ ]BRBPR    [  ]epistaxis  [  ]rash    [ ]lower extremity edema        [ X] All others negative	  [ ] Unable to obtain      LABS:	 	    CARDIAC MARKERS:  CARDIAC MARKERS ( 12 Oct 2018 07:10 )  <0.015 ng/mL / x     / 170 U/L / x     / 1.5 ng/mL  CARDIAC MARKERS ( 11 Oct 2018 20:21 )  <0.015 ng/mL / x     / 187 U/L / x     / 1.4 ng/mL  CARDIAC MARKERS ( 11 Oct 2018 12:34 )  <0.015 ng/mL / x     / x     / x     / x                                  9.1    9.1   )-----------( 177      ( 12 Oct 2018 07:10 )             27.7     Hb Trend: 9.1<--, 9.2<--    10-12    137  |  103  |  18  ----------------------------<  95  4.0   |  24  |  0.97    Ca    8.9      12 Oct 2018 07:10  Phos  3.5     10-12  Mg     2.5     10-12    TPro  7.5  /  Alb  3.0<L>  /  TBili  0.4  /  DBili  x   /  AST  25  /  ALT  27  /  AlkPhos  50  10-11    Creatinine Trend: 0.97<--, 1.12<--    Coags:      proBNP:   Lipid Profile:   HgA1c: Hemoglobin A1C, Whole Blood: 6.0 % (10-12 @ 09:37)    TSH: Thyroid Stimulating Hormone, Serum: 1.04 uU/mL (10-12 @ 07:10)          PHYSICAL EXAM:  T(C): 36.8 (10-12-18 @ 07:00), Max: 37.1 (10-12-18 @ 04:48)  HR: 96 (10-12-18 @ 07:00) (88 - 102)  BP: 131/56 (10-12-18 @ 07:00) (119/56 - 131/56)  RR: 18 (10-12-18 @ 07:00) (18 - 22)  SpO2: 99% (10-12-18 @ 07:00) (99% - 100%)  Wt(kg): --  I&O's Summary    11 Oct 2018 07:01  -  12 Oct 2018 07:00  --------------------------------------------------------  IN: 200 mL / OUT: 0 mL / NET: 200 mL        Gen: Appears well in NAD  HEENT:  (-)icterus (-)pallor  CV: N S1 S2 1/6 MATILDA (+)2 Pulses B/l  Resp:  Clear to ausculatation B/L, normal effort  GI: (+) BS Soft, NT, ND  Lymph:  (-)Edema, (-)obvious lymphadenopathy  Skin: Warm to touch, Normal turgor  Psych: Appropriate mood and affect, but confused        TELEMETRY: 	  Sinus     ECG:  	NSR 97 BPM RBBB    RADIOLOGY:         CXR:  (-) < from: Xray Chest 1 View AP/PA (10.11.18 @ 12:10) >  Questionable new focal density left perihilar region. CT angiogram chest   order already present. This could be better evaluated on the CT exam.    No pleural effusion. Biapical pleural thickening.    Chronic left upper rib deformity.    Heart size within normal limits.    Stable chronic mild compression deformity mid thoracic spine      < end of copied text >      ASSESSMENT/PLAN: 	83y Male Dementia, COPD, HTN, PVD, T2DM, and Urinary Incontinence presents from [facility] for further evaluation of chest pain.    - R/o for MI  - F/u echo  - Suspect sxs due to PNA, cont Abx per med  - not a candidate for ischemic eval given dementia    Guero Ordonez MD, FACC HISTORY OF PRESENT ILLNESS: HPI:  83 year old  male w/ PMH of Dementia, COPD, HTN, PVD, T2DM, and Urinary Incontinence presents from [facility] for further evaluation of chest pain and reported LUE pain - patient poor historian, [place] (but usually knows where, unable to provider further HPI. In the ED patient seen in NAD coughing nonproductive at bedside - ED w/u included CTA negative for PE but noted b/l upper lobes, lingula and RML opacities concerning for infectious cause and XR of the LUE negative for acute fracture. Patient received dose of Azithromycin and Ceftriaxone along w/ 162 mg of ASA. Spoke w/ HCP Akiko Moon - updated about patient's status; DNR but okay with intubation. Family denied Hx of TB infection and came from [place] 40 years ago -  phone number . (11 Oct 2018 17:48)  HE is currently denying CP      PAST MEDICAL & SURGICAL HISTORY:  Diabetes mellitus  Osteoporosis  Osteoarthritis  HTN (hypertension)  Depression  Anemia  Seizure  Dementia  No significant past surgical history          MEDICATIONS:  MEDICATIONS  (STANDING):  aspirin enteric coated 81 milliGRAM(s) Oral daily  azithromycin  IVPB 500 milliGRAM(s) IV Intermittent every 24 hours  cefTRIAXone   IVPB 1 Gram(s) IV Intermittent every 24 hours  cholecalciferol 1000 Unit(s) Oral daily  ferrous    sulfate 325 milliGRAM(s) Oral daily  folic acid 1 milliGRAM(s) Oral daily  insulin lispro (HumaLOG) corrective regimen sliding scale   SubCutaneous Before meals and at bedtime  levETIRAcetam 500 milliGRAM(s) Oral two times a day  losartan 25 milliGRAM(s) Oral daily  PARoxetine 10 milliGRAM(s) Oral daily  simvastatin 10 milliGRAM(s) Oral at bedtime      Allergies    No Known Allergies    Intolerances        FAMILY HISTORY:  No pertinent family history in first degree relatives    Non-contributary for premature coronary disease or sudden cardiac death    SOCIAL HISTORY:    [ ] Non-smoker  [ ] Smoker  [ ] Alcohol      REVIEW OF SYSTEMS:  [ ]chest pain  [  ]shortness of breath  [  ]palpitations  [  ]syncope  [ ]near syncope [ ]upper extremity weakness   [ ] lower extremity weakness  [  ]diplopia  [  ]altered mental status   [  ]fevers  [ ]chills [ ]nausea  [ ]vomitting  [  ]dysphagia    [ ]abdominal pain  [ ]melena  [ ]BRBPR    [  ]epistaxis  [  ]rash    [ ]lower extremity edema        [ X] All others negative	  [ ] Unable to obtain      LABS:	 	    CARDIAC MARKERS:  CARDIAC MARKERS ( 12 Oct 2018 07:10 )  <0.015 ng/mL / x     / 170 U/L / x     / 1.5 ng/mL  CARDIAC MARKERS ( 11 Oct 2018 20:21 )  <0.015 ng/mL / x     / 187 U/L / x     / 1.4 ng/mL  CARDIAC MARKERS ( 11 Oct 2018 12:34 )  <0.015 ng/mL / x     / x     / x     / x                                  9.1    9.1   )-----------( 177      ( 12 Oct 2018 07:10 )             27.7     Hb Trend: 9.1<--, 9.2<--    10-12    137  |  103  |  18  ----------------------------<  95  4.0   |  24  |  0.97    Ca    8.9      12 Oct 2018 07:10  Phos  3.5     10-12  Mg     2.5     10-12    TPro  7.5  /  Alb  3.0<L>  /  TBili  0.4  /  DBili  x   /  AST  25  /  ALT  27  /  AlkPhos  50  10-11    Creatinine Trend: 0.97<--, 1.12<--    Coags:      proBNP:   Lipid Profile:   HgA1c: Hemoglobin A1C, Whole Blood: 6.0 % (10-12 @ 09:37)    TSH: Thyroid Stimulating Hormone, Serum: 1.04 uU/mL (10-12 @ 07:10)          PHYSICAL EXAM:  T(C): 36.8 (10-12-18 @ 07:00), Max: 37.1 (10-12-18 @ 04:48)  HR: 96 (10-12-18 @ 07:00) (88 - 102)  BP: 131/56 (10-12-18 @ 07:00) (119/56 - 131/56)  RR: 18 (10-12-18 @ 07:00) (18 - 22)  SpO2: 99% (10-12-18 @ 07:00) (99% - 100%)  Wt(kg): --  I&O's Summary    11 Oct 2018 07:01  -  12 Oct 2018 07:00  --------------------------------------------------------  IN: 200 mL / OUT: 0 mL / NET: 200 mL        Gen: Appears well in NAD  HEENT:  (-)icterus (-)pallor  CV: N S1 S2 1/6 MATILDA (+)2 Pulses B/l  Resp:  Clear to ausculatation B/L, normal effort  GI: (+) BS Soft, NT, ND  Lymph:  (-)Edema, (-)obvious lymphadenopathy  Skin: Warm to touch, Normal turgor  Psych: Appropriate mood and affect, but confused        TELEMETRY: 	  Sinus     ECG:  	NSR 97 BPM RBBB    RADIOLOGY:         CXR:  (-) < from: Xray Chest 1 View AP/PA (10.11.18 @ 12:10) >  Questionable new focal density left perihilar region. CT angiogram chest   order already present. This could be better evaluated on the CT exam.    No pleural effusion. Biapical pleural thickening.    Chronic left upper rib deformity.    Heart size within normal limits.    Stable chronic mild compression deformity mid thoracic spine      < end of copied text >      ASSESSMENT/PLAN: 	83y Male Dementia, COPD, HTN, PVD, T2DM, and Urinary Incontinence presents from [facility] for further evaluation of chest pain.    - R/o for MI  - F/u echo  - Suspect sxs due to PNA, cont Abx per med  - not a candidate for ischemic eval given dementia  - Consider Vascular eval for AAA  - Start BB      Guero Ordonez MD, FACC

## 2018-10-13 LAB
GLUCOSE BLDC GLUCOMTR-MCNC: 108 MG/DL — HIGH (ref 70–99)
GLUCOSE BLDC GLUCOMTR-MCNC: 142 MG/DL — HIGH (ref 70–99)
GLUCOSE BLDC GLUCOMTR-MCNC: 92 MG/DL — SIGNIFICANT CHANGE UP (ref 70–99)
GLUCOSE BLDC GLUCOMTR-MCNC: 93 MG/DL — SIGNIFICANT CHANGE UP (ref 70–99)

## 2018-10-13 RX ADMIN — Medication 81 MILLIGRAM(S): at 12:59

## 2018-10-13 RX ADMIN — SIMVASTATIN 10 MILLIGRAM(S): 20 TABLET, FILM COATED ORAL at 22:11

## 2018-10-13 RX ADMIN — LEVETIRACETAM 500 MILLIGRAM(S): 250 TABLET, FILM COATED ORAL at 06:20

## 2018-10-13 RX ADMIN — Medication 10 MILLIGRAM(S): at 12:59

## 2018-10-13 RX ADMIN — Medication 25 MILLIGRAM(S): at 06:20

## 2018-10-13 RX ADMIN — CEFTRIAXONE 100 GRAM(S): 500 INJECTION, POWDER, FOR SOLUTION INTRAMUSCULAR; INTRAVENOUS at 17:28

## 2018-10-13 RX ADMIN — AZITHROMYCIN 250 MILLIGRAM(S): 500 TABLET, FILM COATED ORAL at 17:28

## 2018-10-13 RX ADMIN — Medication 325 MILLIGRAM(S): at 12:59

## 2018-10-13 RX ADMIN — LEVETIRACETAM 500 MILLIGRAM(S): 250 TABLET, FILM COATED ORAL at 17:26

## 2018-10-13 RX ADMIN — Medication 1 MILLIGRAM(S): at 12:59

## 2018-10-13 RX ADMIN — LOSARTAN POTASSIUM 25 MILLIGRAM(S): 100 TABLET, FILM COATED ORAL at 06:20

## 2018-10-13 RX ADMIN — Medication 1000 UNIT(S): at 12:59

## 2018-10-13 NOTE — CONSULT NOTE ADULT - GASTROINTESTINAL DETAILS
no distention/no rebound tenderness/no rigidity/no masses palpable/bowel sounds normal/nontender/soft/no guarding

## 2018-10-13 NOTE — CONSULT NOTE ADULT - REASON FOR ADMISSION
chest pain and cough

## 2018-10-13 NOTE — CONSULT NOTE ADULT - SUBJECTIVE AND OBJECTIVE BOX
HPI:  83 year old  male w/ PMH of Dementia, COPD, HTN, PVD, T2DM, seizures, and Urinary Incontinence presents from [facility] for further evaluation of chest pain and reported LUE pain - patient poor historian, [place] (but usually knows where, unable to provider further HPI. In the ED patient seen in NAD coughing nonproductive at bedside - ED w/u included CTA negative for PE but noted b/l upper lobes, lingula and RML opacities concerning for infectious cause and XR of the LUE negative for acute fracture. Patient received dose of Azithromycin and Ceftriaxone along w/ 162 mg of ASA. Spoke w/ HCP Akiko Moon - updated about patient's status; DNR but okay with intubation. Family denied Hx of TB infection and came from [place] 40 years ago -  phone number . (11 Oct 2018 17:48)    ID consult was called due to CTA findings of Bilateral upper lobe, lingular, and right middle lobe predominant, tree-in-bud opacities and consolidations, likely infectious in etiology.   Differential considerations include atypical infection such as JAMESON. Pt is afebrile since admission, was admitted with slight leukocytosis of 11.8 - normalized. Pt denies having cough, has history of COPD, upon exam + bilateral rhonchi. Pt is on Zithromax 500 mg IV q 24 hours and Rocephin 1 gram IV q 24 hours (both day #3). Blood cultures with not growth - collected on 10/11/18.       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       ALLERGIES: No Known Allergies      MEDS:  ALBUTerol    90 MICROgram(s) HFA Inhaler 2 Puff(s) Inhalation every 6 hours PRN  aspirin enteric coated 81 milliGRAM(s) Oral daily  azithromycin  IVPB 500 milliGRAM(s) IV Intermittent every 24 hours  cefTRIAXone   IVPB 1 Gram(s) IV Intermittent every 24 hours  cholecalciferol 1000 Unit(s) Oral daily  ferrous    sulfate 325 milliGRAM(s) Oral daily  folic acid 1 milliGRAM(s) Oral daily  insulin lispro (HumaLOG) corrective regimen sliding scale   SubCutaneous Before meals and at bedtime  levETIRAcetam 500 milliGRAM(s) Oral two times a day  losartan 25 milliGRAM(s) Oral daily  metoprolol succinate ER 25 milliGRAM(s) Oral daily  PARoxetine 10 milliGRAM(s) Oral daily  simvastatin 10 milliGRAM(s) Oral at bedtime    FAMILY HISTORY:  No pertinent family history in first degree relatives    + history of smoking in the past      VITALS:  Vital Signs Last 24 Hrs  T(C): 37.2 (13 Oct 2018 07:50), Max: 37.6 (12 Oct 2018 20:32)  T(F): 99 (13 Oct 2018 07:50), Max: 99.7 (12 Oct 2018 20:32)  HR: 82 (13 Oct 2018 07:50) (77 - 92)  BP: 134/73 (13 Oct 2018 07:50) (109/47 - 135/70)  BP(mean): --  RR: 18 (13 Oct 2018 07:50) (17 - 18)  SpO2: 96% (13 Oct 2018 07:50) (91% - 100%)    LABS/DIAGNOSTIC TESTS:                        9.1    9.1   )-----------( 177      ( 12 Oct 2018 07:10 )             27.7     WBC Count: 9.1 K/uL (10-12 @ 07:10)  WBC Count: 11.8 K/uL (10-11 @ 12:34)    10-12    137  |  103  |  18  ----------------------------<  95  4.0   |  24  |  0.97    Ca    8.9      12 Oct 2018 07:10  Phos  3.5     10-12  Mg     2.5     10-12    TPro  7.5  /  Alb  3.0<L>  /  TBili  0.4  /  DBili  x   /  AST  25  /  ALT  27  /  AlkPhos  50  10-11          LIVER FUNCTIONS - ( 11 Oct 2018 12:34 )  Alb: 3.0 g/dL / Pro: 7.5 g/dL / ALK PHOS: 50 U/L / ALT: 27 U/L DA / AST: 25 U/L / GGT: x             PT/INR - ( 11 Oct 2018 12:34 )   PT: 14.1 sec;   INR: 1.29 ratio         PTT - ( 11 Oct 2018 12:34 )  PTT:23.8 sec    LACTATE:    Lactate, Blood (10.11.18 @ 19:18)    Lactate, Blood: 0.8 mmol/L    Hemoglobin A1C, Whole Blood (10.12.18 @ 09:37)    Hemoglobin A1C, Whole Blood: 6.0: Method: Immunoassay       Reference Range                4.0-5.6%       High risk (prediabetic)        5.7-6.4%       Diabetic, diagnostic             >=6.5%       ADA diabetic treatment goal       <7.0%  The Hemoglobin A1c testing is NGSP-certified.Reference ranges are based  upon the 2010 recommendations of  the American Diabetes Association.  Interpretation may vary for children  and adolescents. %    Troponin I, II, and III - negative    CULTURES:   .Blood Blood  10-11 @ 23:49   No growth to date.  --  --      RADIOLOGY:        < from: CT Angio Chest w/ IV Cont (10.11.18 @ 13:51) >  EXAM:  CT ANGIO CHEST (W)AW IC                            PROCEDURE DATE:  10/11/2018          INTERPRETATION:  CLINICAL INFORMATION: Chest pain.    COMPARISON: Chest CT 10/14/2016.    PROCEDURE:   CT of the Chest was performed without intravenous contrast.  Sagittal and coronal reformats were performed.      FINDINGS:    CHEST:     LUNGS AND LARGE AIRWAYS: Patent central airways. Bilateral upper lobe,   lingular, and right middle lobe predominant tree-in-bud opacities and   patchy consolidations.  PLEURA: Biapical pleural-parenchymal scarring   VESSELS: Good opacification of the pulmonary arterial tree. No pulmonary   embolism.   HEART: Heart size is normal.No pericardial effusion.  MEDIASTINUM AND FUAD: No lymphadenopathy.  CHEST WALL ANDLOWER NECK: Within normal limits.  VISUALIZED UPPER ABDOMEN: Partially imaged infrarenal abdominal aortic   aneurysm measures 5.2 cm, previously 4.6 cm. Mildly thickened left   adrenal gland.  BONES: Sclerosis and cortical thickening at the posteriorright eighth   rib, unchanged. Chronic fracture deformity of the left scapula. Mild   compression deformities of T3, T8, and T9, unchanged from 10/14/2016.    IMPRESSION:   1.  No pulmonary embolism.  2.  Bilateral upper lobe, lingular, and right middle lobe predominant   tree-in-bud opacities and consolidations, likely infectious in etiology.   Differential considerations include atypical infection such as JAMESON.  3.  Interval increase in size of partially imaged infrarenal abdominal   aortic aneurysm, which currently measures 5.2 cm, previously 4.6 cm. This   may be due to differences in technique. Consider dedicated abdominal   imaging if clinically necessary.                  DOMINIC COLVIN M.D., RADIOLOGY RESIDENT  This document has been electronically signed.  BRITTNY FRITZ M.D., ATTENDING RADIOLOGIST  This document has been electronically signed. Oct 11 2018  2:10PM    < end of copied text >      < from: Xray Chest 1 View AP/PA (10.11.18 @ 12:10) >  EXAM:  XR CHEST AP OR PA 1V                            PROCEDURE DATE:  10/11/2018          INTERPRETATION:  CLINICAL STATEMENT: Chest pain.    TECHNIQUE: AP view of the chest.    COMPARISON: 10/10/2016    FINDINGS/  IMPRESSION:  Questionable new focal density left perihilar region. CT angiogram chest   order already present. This could be better evaluated on the CT exam.    No pleural effusion. Biapical pleural thickening.    Chronic left upper rib deformity.    Heart size within normal limits.    Stable chronic mild compression deformity mid thoracic spine      OSBALDO THURSTON M.D., ATTENDING RADIOLOGIST  This document has been electronically signed. Oct 11 2018 12:18PM    < end of copied text > HPI:  83 year old  male w/ PMH of Dementia, COPD, HTN, PVD, T2DM, seizures, and Urinary Incontinence presents from [facility] for further evaluation of chest pain and reported LUE pain - patient poor historian, [place] (but usually knows where, unable to provider further HPI. In the ED patient seen in NAD coughing nonproductive at bedside - ED w/u included CTA negative for PE but noted b/l upper lobes, lingula and RML opacities concerning for infectious cause and XR of the LUE negative for acute fracture. Patient received dose of Azithromycin and Ceftriaxone along w/ 162 mg of ASA. Spoke w/ HCP Akiko Moon - updated about patient's status; DNR but okay with intubation. Family denied Hx of TB infection and came from [place] 40 years ago -  phone number . (11 Oct 2018 17:48)    ID consult was called due to CTA findings of Bilateral upper lobe, lingular, and right middle lobe predominant, tree-in-bud opacities and consolidations, likely infectious in etiology.   Differential considerations include atypical infection such as JAMESON. Pt is afebrile since admission, was admitted with slight leukocytosis of 11.8 - normalized. Pt denies having cough, has history of COPD, upon exam + bilateral rhonchi. Pt is on Zithromax 500 mg IV q 24 hours and Rocephin 1 gram IV q 24 hours (both day #3).        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       ALLERGIES: No Known Allergies      MEDS:  ALBUTerol    90 MICROgram(s) HFA Inhaler 2 Puff(s) Inhalation every 6 hours PRN  aspirin enteric coated 81 milliGRAM(s) Oral daily  azithromycin  IVPB 500 milliGRAM(s) IV Intermittent every 24 hours  cefTRIAXone   IVPB 1 Gram(s) IV Intermittent every 24 hours  cholecalciferol 1000 Unit(s) Oral daily  ferrous    sulfate 325 milliGRAM(s) Oral daily  folic acid 1 milliGRAM(s) Oral daily  insulin lispro (HumaLOG) corrective regimen sliding scale   SubCutaneous Before meals and at bedtime  levETIRAcetam 500 milliGRAM(s) Oral two times a day  losartan 25 milliGRAM(s) Oral daily  metoprolol succinate ER 25 milliGRAM(s) Oral daily  PARoxetine 10 milliGRAM(s) Oral daily  simvastatin 10 milliGRAM(s) Oral at bedtime    FAMILY HISTORY:  No pertinent family history in first degree relatives    + history of smoking in the past      VITALS:  Vital Signs Last 24 Hrs  T(C): 37.2 (13 Oct 2018 07:50), Max: 37.6 (12 Oct 2018 20:32)  T(F): 99 (13 Oct 2018 07:50), Max: 99.7 (12 Oct 2018 20:32)  HR: 82 (13 Oct 2018 07:50) (77 - 92)  BP: 134/73 (13 Oct 2018 07:50) (109/47 - 135/70)  BP(mean): --  RR: 18 (13 Oct 2018 07:50) (17 - 18)  SpO2: 96% (13 Oct 2018 07:50) (91% - 100%)    LABS/DIAGNOSTIC TESTS:                        9.1    9.1   )-----------( 177      ( 12 Oct 2018 07:10 )             27.7     WBC Count: 9.1 K/uL (10-12 @ 07:10)  WBC Count: 11.8 K/uL (10-11 @ 12:34)    10-12    137  |  103  |  18  ----------------------------<  95  4.0   |  24  |  0.97    Ca    8.9      12 Oct 2018 07:10  Phos  3.5     10-12  Mg     2.5     10-12    TPro  7.5  /  Alb  3.0<L>  /  TBili  0.4  /  DBili  x   /  AST  25  /  ALT  27  /  AlkPhos  50  10-11          LIVER FUNCTIONS - ( 11 Oct 2018 12:34 )  Alb: 3.0 g/dL / Pro: 7.5 g/dL / ALK PHOS: 50 U/L / ALT: 27 U/L DA / AST: 25 U/L / GGT: x             PT/INR - ( 11 Oct 2018 12:34 )   PT: 14.1 sec;   INR: 1.29 ratio         PTT - ( 11 Oct 2018 12:34 )  PTT:23.8 sec    LACTATE:    Lactate, Blood (10.11.18 @ 19:18)    Lactate, Blood: 0.8 mmol/L    Hemoglobin A1C, Whole Blood (10.12.18 @ 09:37)    Hemoglobin A1C, Whole Blood: 6.0: Method: Immunoassay       Reference Range                4.0-5.6%       High risk (prediabetic)        5.7-6.4%       Diabetic, diagnostic             >=6.5%       ADA diabetic treatment goal       <7.0%  The Hemoglobin A1c testing is NGSP-certified.Reference ranges are based  upon the 2010 recommendations of  the American Diabetes Association.  Interpretation may vary for children  and adolescents. %    Troponin I, II, and III - negative    CULTURES:   .Blood Blood  10-11 @ 23:49   No growth to date.  --  --      RADIOLOGY:        < from: CT Angio Chest w/ IV Cont (10.11.18 @ 13:51) >  EXAM:  CT ANGIO CHEST (W)AW IC                            PROCEDURE DATE:  10/11/2018          INTERPRETATION:  CLINICAL INFORMATION: Chest pain.    COMPARISON: Chest CT 10/14/2016.    PROCEDURE:   CT of the Chest was performed without intravenous contrast.  Sagittal and coronal reformats were performed.      FINDINGS:    CHEST:     LUNGS AND LARGE AIRWAYS: Patent central airways. Bilateral upper lobe,   lingular, and right middle lobe predominant tree-in-bud opacities and   patchy consolidations.  PLEURA: Biapical pleural-parenchymal scarring   VESSELS: Good opacification of the pulmonary arterial tree. No pulmonary   embolism.   HEART: Heart size is normal.No pericardial effusion.  MEDIASTINUM AND FUAD: No lymphadenopathy.  CHEST WALL ANDLOWER NECK: Within normal limits.  VISUALIZED UPPER ABDOMEN: Partially imaged infrarenal abdominal aortic   aneurysm measures 5.2 cm, previously 4.6 cm. Mildly thickened left   adrenal gland.  BONES: Sclerosis and cortical thickening at the posteriorright eighth   rib, unchanged. Chronic fracture deformity of the left scapula. Mild   compression deformities of T3, T8, and T9, unchanged from 10/14/2016.    IMPRESSION:   1.  No pulmonary embolism.  2.  Bilateral upper lobe, lingular, and right middle lobe predominant   tree-in-bud opacities and consolidations, likely infectious in etiology.   Differential considerations include atypical infection such as JAMESON.  3.  Interval increase in size of partially imaged infrarenal abdominal   aortic aneurysm, which currently measures 5.2 cm, previously 4.6 cm. This   may be due to differences in technique. Consider dedicated abdominal   imaging if clinically necessary.                  DOMINIC COLVIN M.D., RADIOLOGY RESIDENT  This document has been electronically signed.  BRITTNY FRITZ M.D., ATTENDING RADIOLOGIST  This document has been electronically signed. Oct 11 2018  2:10PM    < end of copied text >      < from: Xray Chest 1 View AP/PA (10.11.18 @ 12:10) >  EXAM:  XR CHEST AP OR PA 1V                            PROCEDURE DATE:  10/11/2018          INTERPRETATION:  CLINICAL STATEMENT: Chest pain.    TECHNIQUE: AP view of the chest.    COMPARISON: 10/10/2016    FINDINGS/  IMPRESSION:  Questionable new focal density left perihilar region. CT angiogram chest   order already present. This could be better evaluated on the CT exam.    No pleural effusion. Biapical pleural thickening.    Chronic left upper rib deformity.    Heart size within normal limits.    Stable chronic mild compression deformity mid thoracic spine      OSBALDO THURSTON M.D., ATTENDING RADIOLOGIST  This document has been electronically signed. Oct 11 2018 12:18PM    < end of copied text >

## 2018-10-13 NOTE — CONSULT NOTE ADULT - ASSESSMENT
Infrarenal abdominal aortic aneursym 5.2 cm increased from 4.6cm in 2016  1. Pt asymptomatic  2. Keep BP controlled  3. Will D/w Dr. Zhang
1. Pneumonia bilateral  2. Leukocytosis - resolved    Plan:  Continue Zithromax 500 mg IV Q 24 hours (day #3)  Continue Rocephin 1 gram IV q 24 hours (day #3)

## 2018-10-13 NOTE — PROGRESS NOTE ADULT - SUBJECTIVE AND OBJECTIVE BOX
Subjective: No chest pain or sob   	  MEDICATIONS:  MEDICATIONS  (STANDING):  aspirin enteric coated 81 milliGRAM(s) Oral daily  azithromycin  IVPB 500 milliGRAM(s) IV Intermittent every 24 hours  cefTRIAXone   IVPB 1 Gram(s) IV Intermittent every 24 hours  cholecalciferol 1000 Unit(s) Oral daily  ferrous    sulfate 325 milliGRAM(s) Oral daily  folic acid 1 milliGRAM(s) Oral daily  insulin lispro (HumaLOG) corrective regimen sliding scale   SubCutaneous Before meals and at bedtime  levETIRAcetam 500 milliGRAM(s) Oral two times a day  losartan 25 milliGRAM(s) Oral daily  metoprolol succinate ER 25 milliGRAM(s) Oral daily  PARoxetine 10 milliGRAM(s) Oral daily  simvastatin 10 milliGRAM(s) Oral at bedtime      LABS:	 	    CARDIAC MARKERS:  CARDIAC MARKERS ( 12 Oct 2018 07:10 )  <0.015 ng/mL / x     / 170 U/L / x     / 1.5 ng/mL  CARDIAC MARKERS ( 11 Oct 2018 20:21 )  <0.015 ng/mL / x     / 187 U/L / x     / 1.4 ng/mL  CARDIAC MARKERS ( 11 Oct 2018 12:34 )  <0.015 ng/mL / x     / x     / x     / x                                    9.1    9.1   )-----------( 177      ( 12 Oct 2018 07:10 )             27.7     10-12    137  |  103  |  18  ----------------------------<  95  4.0   |  24  |  0.97    Ca    8.9      12 Oct 2018 07:10  Phos  3.5     10-12  Mg     2.5     10-12    TPro  7.5  /  Alb  3.0<L>  /  TBili  0.4  /  DBili  x   /  AST  25  /  ALT  27  /  AlkPhos  50  10-11    proBNP:   Lipid Profile:   HgA1c:   TSH:       PHYSICAL EXAM:  T(C): 37.2 (10-13-18 @ 07:50), Max: 37.6 (10-12-18 @ 20:32)  HR: 82 (10-13-18 @ 07:50) (77 - 92)  BP: 134/73 (10-13-18 @ 07:50) (114/57 - 135/70)  RR: 18 (10-13-18 @ 07:50) (17 - 18)  SpO2: 96% (10-13-18 @ 07:50) (91% - 100%)  Wt(kg): --  I&O's Summary      Heart: normal S1, S2, RRR, no m/r/g  Lungs: cta b/l  Abd: soft nT  Ext: no edema     TELEMETRY: 	    ECG:  	  RADIOLOGY:   DIAGNOSTIC TESTING:  [ ] Echocardiogram: < from: Transthoracic Echocardiogram (10.12.18 @ 07:45) >  CONCLUSIONS:  1. Normal mitral valve. Trace mitral regurgitation.  2. Normal trileaflet aortic valve. No aortic stenosis.  Trace aortic regurgitation.  3. Normal aortic root.  4. Normal left atrium.  5. Normal left ventricular internal dimensions and wall  thicknesses.  6. Hyperdynamic left ventricular systolic function (EF  >70%).  7. Grade I diastolic dysfunction (Impaired relaxation).  8. Right ventricle not well visualized. Normal RV systolic  function (TAPSE 1.7 cm).  9. Normal tricuspid valve. Trace tricuspid regurgitation.  10. Pulmonic valve not well seen. Trace pulmonic  insufficiency is noted.  11. No pericardial effusion.    < end of copied text >    [ ]  Catheterization:  [ ] Stress Test:    OTHER: 	      ASSESSMENT/PLAN: 83y Male Dementia, COPD, HTN, PVD, T2DM, and Urinary Incontinence presents from [facility] for further evaluation of chest pain.    -TTE with normal LV function  -not a good candidate for ischemic eval given advanced dementia  -continue with beta blockers  -vascular follow up for AAA    Mauri Ji MD

## 2018-10-13 NOTE — PROGRESS NOTE ADULT - SUBJECTIVE AND OBJECTIVE BOX
Patient is a 83y old  Male who presents with a chief complaint of chest pain and cough (13 Oct 2018 18:12)    PATIENT IS SEEN AND EXAMINED IN MEDICAL FLOOR.    ALLERGIES:  No Known Allergies  Daily Weight in k (13 Oct 2018 04:53)    VITALS:    Vital Signs Last 24 Hrs  T(C): 37.3 (13 Oct 2018 15:14), Max: 37.6 (12 Oct 2018 20:32)  T(F): 99.2 (13 Oct 2018 15:14), Max: 99.7 (12 Oct 2018 20:32)  HR: 82 (13 Oct 2018 15:14) (77 - 87)  BP: 133/71 (13 Oct 2018 15:14) (113/65 - 135/70)  BP(mean): --  RR: 18 (13 Oct 2018 15:14) (17 - 18)  SpO2: 95% (13 Oct 2018 15:14) (91% - 100%)    LABS:  CBC Full  -  ( 12 Oct 2018 07:10 )  WBC Count : 9.1 K/uL  Hemoglobin : 9.1 g/dL  Hematocrit : 27.7 %  Platelet Count - Automated : 177 K/uL  Mean Cell Volume : 102.2 fl  Mean Cell Hemoglobin : 33.6 pg  Mean Cell Hemoglobin Concentration : 32.9 gm/dL  Auto Neutrophil # : x  Auto Lymphocyte # : x  Auto Monocyte # : x  Auto Eosinophil # : x  Auto Basophil # : x  Auto Neutrophil % : x  Auto Lymphocyte % : x  Auto Monocyte % : x  Auto Eosinophil % : x  Auto Basophil % : x      10-12    137  |  103  |  18  ----------------------------<  95  4.0   |  24  |  0.97    Ca    8.9      12 Oct 2018 07:10  Phos  3.5     10-12  Mg     2.5     10-12      CAPILLARY BLOOD GLUCOSE    POCT Blood Glucose.: 108 mg/dL (13 Oct 2018 16:34)  POCT Blood Glucose.: 142 mg/dL (13 Oct 2018 12:01)  POCT Blood Glucose.: 92 mg/dL (13 Oct 2018 07:50)  POCT Blood Glucose.: 115 mg/dL (12 Oct 2018 21:24)    CARDIAC MARKERS ( 12 Oct 2018 07:10 )  <0.015 ng/mL / x     / 170 U/L / x     / 1.5 ng/mL  CARDIAC MARKERS ( 11 Oct 2018 20:21 )  <0.015 ng/mL / x     / 187 U/L / x     / 1.4 ng/mL      .Blood Blood  10- @ 23:49   No growth to date.  --  --    MEDICATIONS:    MEDICATIONS  (STANDING):  aspirin enteric coated 81 milliGRAM(s) Oral daily  azithromycin  IVPB 500 milliGRAM(s) IV Intermittent every 24 hours  cefTRIAXone   IVPB 1 Gram(s) IV Intermittent every 24 hours  cholecalciferol 1000 Unit(s) Oral daily  ferrous    sulfate 325 milliGRAM(s) Oral daily  folic acid 1 milliGRAM(s) Oral daily  insulin lispro (HumaLOG) corrective regimen sliding scale   SubCutaneous Before meals and at bedtime  levETIRAcetam 500 milliGRAM(s) Oral two times a day  losartan 25 milliGRAM(s) Oral daily  metoprolol succinate ER 25 milliGRAM(s) Oral daily  PARoxetine 10 milliGRAM(s) Oral daily  simvastatin 10 milliGRAM(s) Oral at bedtime      MEDICATIONS  (PRN):  ALBUTerol    90 MICROgram(s) HFA Inhaler 2 Puff(s) Inhalation every 6 hours PRN Shortness of Breath and/or Wheezing      REVIEW OF SYSTEMS:                           ALL ROS DONE [ X   ]    CONSTITUTIONAL:  LETHARGIC [   ], FEVER [   ], UNRESPONSIVE [   ]  CVS:  CP  [   ], SOB, [   ], PALPITATIONS [   ], DIZZYNESS [   ]  RS: COUGH [   ], SPUTUM [   ]  GI: ABDOMINAL PAIN [   ], NAUSEA [   ], VOMITINGS [   ], DIARRHEA [   ], CONSTIPATION [   ]  :  DYSURIA [   ], NOCTURIA [   ], INCREASED FREQUENCY [   ], DRIBLING [   ],  SKELETAL: PAINFUL JOINTS [   ], SWOLLEN JOINTS [   ], NECK ACHE [   ], LOW BACK ACHE [   ],  SKIN : ULCERS [   ], RASH [   ], ITCHING [   ]  CNS: HEAD ACHE [   ], DOUBLE VISION [   ], BLURRED VISION [   ], AMS / CONFUSION [   ], SEIZURES [   ], WEAKNESS [   ],TINGLING / NUMBNESS [   ]    PHYSICAL EXAMINATION:  GENERAL APPEARANCE: NO DISTRESS  HEENT:  NO PALLOR, NO  JVD,  NO   NODES, NECK SUPPLE  CVS: S1 +, S2 +,   RS: AEEB,  OCCASIONAL  RALES +,   NO RONCHI  ABD: SOFT, NT, NO, BS +  EXT: NO PE  SKIN: WARM,   SKELETAL:  ROM ACCEPTABLE  CNS:  AAO X 1-2 , NO  DEFICITS    RADIOLOGY :    < from: CT Angio Chest w/ IV Cont (10.11.18 @ 13:51) >  IMPRESSION:   1.  No pulmonary embolism.  2.  Bilateral upper lobe, lingular, and right middle lobe predominant   tree-in-bud opacities and consolidations, likely infectious in etiology.   Differential considerations include atypical infection such as JAMESON.  3.  Interval increase in size of partially imaged infrarenal abdominal   aortic aneurysm, which currently measures 5.2 cm, previously 4.6 cm. This   may be due to differences in technique. Consider dedicated abdominal   imaging if clinically necessary.    < end of copied text >      ASSESSMENT :     Chest pain  Yes  Diabetes mellitus  Osteoporosis  Osteoarthritis  HTN (hypertension)  Depression  Anemia  Seizure  Dementia      PLAN:  HPI:  83 year old  male w/ PMH of Dementia, COPD, HTN, PVD, T2DM, and Urinary Incontinence presents from [facility] for further evaluation of chest pain and reported LUE pain - patient poor historian, AAOx1 (but usually knows where, unable to provider further HPI. In the ED patient seen in NAD coughing nonproductive at bedside - ED w/u included CTA negative for PE but noted b/l upper lobes, lingula and RML opacities concerning for infectious cause and XR of the LUE negative for acute fracture. Patient received dose of Azithromycin and Ceftriaxone along w/ 162 mg of ASA. Spoke w/ HCP Akiko Moon - updated about patient's status; DNR but okay with intubation. Family denied Hx of TB infection and came from [place] 40 years ago -  phone number . (11 Oct 2018 17:48)    - CHEST PAIN - ACS IS RULED OUT . DC TELEMETRY  - GRADE 1 LV DIASTOLIC DYSFUNCTION  - B/L PNEUMONIA ON IV ROCEPHIN, AND AZITHROMYCIN  - B/L LUNG NODULES, COPD / SMOKER. CONTINUE NEBS. PULMONARY CONSULT TO EVALUATE B/L LUNG NODULES  - R/O LUNG CANCER. PULMONARY F/UP AS AN OUT PATIENT   - PT AND OT EVALUATION  - DC PLAN ON MONDAY BACK TO [facility]  - GI AND DVT PROPHYLAXIS  - DR. CARRENO

## 2018-10-13 NOTE — CONSULT NOTE ADULT - SUBJECTIVE AND OBJECTIVE BOX
Time of visit:    CHIEF COMPLAINT: Patient is a 83y old  Male who presents with a chief complaint of chest pain and cough (13 Oct 2018 11:12)      HPI:  83 year old  male w/ PMH of Dementia, COPD, HTN, PVD, T2DM, and Urinary Incontinence presents from [facility] for further evaluation of chest pain and reported LUE pain - patient poor historian, [place] (but usually knows where, unable to provider further HPI. In the ED patient seen in NAD coughing nonproductive at bedside - ED w/u included CTA negative for PE but noted b/l upper lobes, lingula and RML opacities concerning for infectious cause and XR of the LUE negative for acute fracture. Patient received dose of Azithromycin and Ceftriaxone along w/ 162 mg of ASA. Spoke w/ HCP Akiko Moon - updated about patient's status; DNR but okay with intubation. Family denied Hx of TB infection and came from [place] years ago -  phone number . (11 Oct 2018 17:48)   Patient seen and examined.     PAST MEDICAL & SURGICAL HISTORY:  Diabetes mellitus  Osteoporosis  Osteoarthritis  HTN (hypertension)  Depression  Anemia  Seizure  Dementia  No significant past surgical history      Allergies    No Known Allergies    Intolerances        MEDICATIONS  (STANDING):  aspirin enteric coated 81 milliGRAM(s) Oral daily  azithromycin  IVPB 500 milliGRAM(s) IV Intermittent every 24 hours  cefTRIAXone   IVPB 1 Gram(s) IV Intermittent every 24 hours  cholecalciferol 1000 Unit(s) Oral daily  ferrous    sulfate 325 milliGRAM(s) Oral daily  folic acid 1 milliGRAM(s) Oral daily  insulin lispro (HumaLOG) corrective regimen sliding scale   SubCutaneous Before meals and at bedtime  levETIRAcetam 500 milliGRAM(s) Oral two times a day  losartan 25 milliGRAM(s) Oral daily  metoprolol succinate ER 25 milliGRAM(s) Oral daily  PARoxetine 10 milliGRAM(s) Oral daily  simvastatin 10 milliGRAM(s) Oral at bedtime      MEDICATIONS  (PRN):  ALBUTerol    90 MICROgram(s) HFA Inhaler 2 Puff(s) Inhalation every 6 hours PRN Shortness of Breath and/or Wheezing   Medications up to date at time of exam.    Medications up to date at time of exam.    FAMILY HISTORY:  No pertinent family history in first degree relatives      SOCIAL HISTORY pat will not give info  Smoking History: [   ] smoking/smoke exposure, [   ] former smoker  Living Condition: [   ] apartment, [   ] private house  Work History:   Travel History: denies recent travel  Illicit Substance Use: denies  Alcohol Use: denies    REVIEW OF SYSTEMS:    CONSTITUTIONAL:  denies fevers, chills, sweats, weight loss    HEENT:  denies diplopia or blurred vision, sore throat or runny nose.    CARDIOVASCULAR:  denies pressure, squeezing, tightness, or heaviness about the chest; no palpitations.    RESPIRATORY:  denies SOB, cough, OLIVO, wheezing.    GASTROINTESTINAL:  denies abdominal pain, nausea, vomiting or diarrhea.    GENITOURINARY: denies dysuria, frequency or urgency.    NEUROLOGIC:  denies numbness, tingling, seizures or weakness.    PSYCHIATRIC:  denies disorder of thought or mood.    MSK: denies swelling, redness      PHYSICAL EXAMINATION:    GENERAL: The patient is a well-developed, well-nourished, in no apparent distress.     Vital Signs Last 24 Hrs  T(C): 37.3 (13 Oct 2018 15:14), Max: 37.6 (12 Oct 2018 20:32)  T(F): 99.2 (13 Oct 2018 15:14), Max: 99.7 (12 Oct 2018 20:32)  HR: 82 (13 Oct 2018 15:14) (77 - 87)  BP: 133/71 (13 Oct 2018 15:14) (113/65 - 135/70)  BP(mean): --  RR: 18 (13 Oct 2018 15:14) (17 - 18)  SpO2: 95% (13 Oct 2018 15:14) (91% - 100%)   (if applicable)    Chest Tube (if applicable)    HEENT: Head is normocephalic and atraumatic. Extraocular muscles are intact. Mucous membranes are moist.     NECK: Supple, no palpable adenopathy.    LUNGS: Clear to auscultation, no wheezing, rales, or rhonchi.    HEART: Regular rate and rhythm without murmur.    ABDOMEN: Soft, nontender, and nondistended.  No hepatosplenomegaly is noted.    RENAL: No difficulty voiding, no pelvic pain    EXTREMITIES: Without any cyanosis, clubbing, rash, lesions or edema.    NEUROLOGIC: Awake, alert, oriented, grossly intact    SKIN: Warm, dry, good turgor.      LABS:                        9.1    9.1   )-----------( 177      ( 12 Oct 2018 07:10 )             27.7     10-12    137  |  103  |  18  ----------------------------<  95  4.0   |  24  |  0.97    Ca    8.9      12 Oct 2018 07:10  Phos  3.5     10-12  Mg     2.5     10-12            CARDIAC MARKERS ( 12 Oct 2018 07:10 )  <0.015 ng/mL / x     / 170 U/L / x     / 1.5 ng/mL  CARDIAC MARKERS ( 11 Oct 2018 20:21 )  <0.015 ng/mL / x     / 187 U/L / x     / 1.4 ng/mL            Procalcitonin, Serum: 0.12 ng/mL (10-12-18 @ 10:39)      MICROBIOLOGY: (if applicable)    RADIOLOGY & ADDITIONAL STUDIES:  EKG:   CT ;< from: CT Angio Chest w/ IV Cont (10.11.18 @ 13:51) >  PROCEDURE:   CT of the Chest was performed without intravenous contrast.  Sagittal and coronal reformats were performed.      FINDINGS:    CHEST:     LUNGS AND LARGE AIRWAYS: Patent central airways. Bilateral upper lobe,   lingular, and right middle lobe predominant tree-in-bud opacities and   patchy consolidations.  PLEURA: Biapical pleural-parenchymal scarring   VESSELS: Good opacification of the pulmonary arterial tree. No pulmonary   embolism.   HEART: Heart size is normal.No pericardial effusion.  MEDIASTINUM AND FUAD: No lymphadenopathy.  CHEST WALL ANDLOWER NECK: Within normal limits.  VISUALIZED UPPER ABDOMEN: Partially imaged infrarenal abdominal aortic   aneurysm measures 5.2 cm, previously 4.6 cm. Mildly thickened left   adrenal gland.  BONES: Sclerosis and cortical thickening at the posteriorright eighth   rib, unchanged. Chronic fracture deformity of the left scapula. Mild   compression deformities of T3, T8, and T9, unchanged from 10/14/2016.    IMPRESSION:   1.  No pulmonary embolism.  2.  Bilateral upper lobe, lingular, and right middle lobe predominant   tree-in-bud opacities and consolidations, likely infectious in etiology.   Differential considerations include atypical infection such as JAMESON.  3.  Interval increase in size of partially imaged infrarenal abdominal   aortic aneurysm, which currently measures 5.2 cm, previously 4.6 cm. This   may be due to differences in technique. Consider dedicated abdominal   imaging if clinically necessary.      < end of copied text >    ECHO:    IMPRESSION: 83y Male PAST MEDICAL & SURGICAL HISTORY:  Diabetes mellitus  Osteoporosis  Osteoarthritis  HTN (hypertension)  Depression  Anemia  Seizure  Dementia  No significant past surgical history   p/w     IMP: This is an 83 yr old man with prior stated medical condition presented with presented with chest pain and cough due to pna unlikely due to ACS. Continue tele monitor and cards f/u CT chest shows infectious processes and an increase in size in infrarenal aneurysm.  vascular eval noted    sugg:   -continue antibx  -duonebs  -BP control  -advise compliance  - f/u cultures  -out pat pulmo f/u

## 2018-10-14 LAB
ANION GAP SERPL CALC-SCNC: 6 MMOL/L — SIGNIFICANT CHANGE UP (ref 5–17)
BUN SERPL-MCNC: 21 MG/DL — HIGH (ref 7–18)
CALCIUM SERPL-MCNC: 8.8 MG/DL — SIGNIFICANT CHANGE UP (ref 8.4–10.5)
CHLORIDE SERPL-SCNC: 105 MMOL/L — SIGNIFICANT CHANGE UP (ref 96–108)
CO2 SERPL-SCNC: 28 MMOL/L — SIGNIFICANT CHANGE UP (ref 22–31)
CREAT SERPL-MCNC: 0.87 MG/DL — SIGNIFICANT CHANGE UP (ref 0.5–1.3)
GLUCOSE BLDC GLUCOMTR-MCNC: 101 MG/DL — HIGH (ref 70–99)
GLUCOSE BLDC GLUCOMTR-MCNC: 105 MG/DL — HIGH (ref 70–99)
GLUCOSE BLDC GLUCOMTR-MCNC: 119 MG/DL — HIGH (ref 70–99)
GLUCOSE BLDC GLUCOMTR-MCNC: 99 MG/DL — SIGNIFICANT CHANGE UP (ref 70–99)
GLUCOSE SERPL-MCNC: 96 MG/DL — SIGNIFICANT CHANGE UP (ref 70–99)
HCT VFR BLD CALC: 30.9 % — LOW (ref 39–50)
HGB BLD-MCNC: 9.9 G/DL — LOW (ref 13–17)
MCHC RBC-ENTMCNC: 32 GM/DL — SIGNIFICANT CHANGE UP (ref 32–36)
MCHC RBC-ENTMCNC: 32.9 PG — SIGNIFICANT CHANGE UP (ref 27–34)
MCV RBC AUTO: 102.9 FL — HIGH (ref 80–100)
PLATELET # BLD AUTO: 215 K/UL — SIGNIFICANT CHANGE UP (ref 150–400)
POTASSIUM SERPL-MCNC: 4.1 MMOL/L — SIGNIFICANT CHANGE UP (ref 3.5–5.3)
POTASSIUM SERPL-SCNC: 4.1 MMOL/L — SIGNIFICANT CHANGE UP (ref 3.5–5.3)
RBC # BLD: 3 M/UL — LOW (ref 4.2–5.8)
RBC # FLD: 12.6 % — SIGNIFICANT CHANGE UP (ref 10.3–14.5)
SODIUM SERPL-SCNC: 139 MMOL/L — SIGNIFICANT CHANGE UP (ref 135–145)
WBC # BLD: 5.5 K/UL — SIGNIFICANT CHANGE UP (ref 3.8–10.5)
WBC # FLD AUTO: 5.5 K/UL — SIGNIFICANT CHANGE UP (ref 3.8–10.5)

## 2018-10-14 RX ADMIN — LEVETIRACETAM 500 MILLIGRAM(S): 250 TABLET, FILM COATED ORAL at 17:16

## 2018-10-14 RX ADMIN — Medication 1 MILLIGRAM(S): at 12:29

## 2018-10-14 RX ADMIN — Medication 81 MILLIGRAM(S): at 12:29

## 2018-10-14 RX ADMIN — Medication 10 MILLIGRAM(S): at 12:29

## 2018-10-14 RX ADMIN — LEVETIRACETAM 500 MILLIGRAM(S): 250 TABLET, FILM COATED ORAL at 05:22

## 2018-10-14 RX ADMIN — SIMVASTATIN 10 MILLIGRAM(S): 20 TABLET, FILM COATED ORAL at 21:34

## 2018-10-14 RX ADMIN — Medication 1000 UNIT(S): at 12:29

## 2018-10-14 RX ADMIN — Medication 325 MILLIGRAM(S): at 12:29

## 2018-10-14 RX ADMIN — Medication 25 MILLIGRAM(S): at 05:22

## 2018-10-14 RX ADMIN — CEFTRIAXONE 100 GRAM(S): 500 INJECTION, POWDER, FOR SOLUTION INTRAMUSCULAR; INTRAVENOUS at 17:17

## 2018-10-14 RX ADMIN — AZITHROMYCIN 250 MILLIGRAM(S): 500 TABLET, FILM COATED ORAL at 17:16

## 2018-10-14 RX ADMIN — LOSARTAN POTASSIUM 25 MILLIGRAM(S): 100 TABLET, FILM COATED ORAL at 05:22

## 2018-10-14 NOTE — PROGRESS NOTE ADULT - ASSESSMENT
82yo male with 5.2cm infrarenal AAA    1) If pt is able to be medically optimized for aggressive management of AAA then will need CTA to evaluate options for surgical repair  2) blood pressure management  3) cont med manmagement  4) vasc will follow
83 year old  male w/ PMH of Dementia, COPD, HTN, PVD, T2DM, and Urinary Incontinence presents from [facility] for further evaluation of chest pain and reported LUE pain - admitting for further evaluation w/ differentials including PNA, ACS, and malignancy
83 year old  male w/ PMH of Dementia, COPD, HTN, PVD, T2DM, and Urinary Incontinence presents from [facility] for further evaluation of chest pain and reported LUE pain - admitting for further evaluation w/ differentials including PNA, ACS, and malignancy
1. Pneumonia bilateral  2. Leukocytosis - resolved    Plan:  Continue Zithromax 500 mg IV Q 24 hours (day #4)  Continue Rocephin 1 gram IV q 24 hours (day #4)

## 2018-10-14 NOTE — PROGRESS NOTE ADULT - SUBJECTIVE AND OBJECTIVE BOX
PGY1 Note discussed with supervising resident and primary attending.    Patient is a 83y old  Male who presents with a chief complaint of chest pain and cough (14 Oct 2018 13:06)      INTERVAL HPI/OVERNIGHT EVENTS: no overnight events    MEDICATIONS  (STANDING):  aspirin enteric coated 81 milliGRAM(s) Oral daily  azithromycin  IVPB 500 milliGRAM(s) IV Intermittent every 24 hours  cefTRIAXone   IVPB 1 Gram(s) IV Intermittent every 24 hours  cholecalciferol 1000 Unit(s) Oral daily  ferrous    sulfate 325 milliGRAM(s) Oral daily  folic acid 1 milliGRAM(s) Oral daily  insulin lispro (HumaLOG) corrective regimen sliding scale   SubCutaneous Before meals and at bedtime  levETIRAcetam 500 milliGRAM(s) Oral two times a day  losartan 25 milliGRAM(s) Oral daily  metoprolol succinate ER 25 milliGRAM(s) Oral daily  PARoxetine 10 milliGRAM(s) Oral daily  simvastatin 10 milliGRAM(s) Oral at bedtime    MEDICATIONS  (PRN):  ALBUTerol    90 MICROgram(s) HFA Inhaler 2 Puff(s) Inhalation every 6 hours PRN Shortness of Breath and/or Wheezing      Allergies    No Known Allergies    Intolerances        REVIEW OF SYSTEMS:  CONSTITUTIONAL: No fever, weight loss, or fatigue  RESPIRATORY: No cough, wheezing, chills or hemoptysis; No shortness of breath  CARDIOVASCULAR: No chest pain, palpitations, dizziness, or leg swelling  GASTROINTESTINAL: No abdominal or epigastric pain.   NEUROLOGICAL: No headaches, memory loss, loss of strength, numbness, or tremors  SKIN: No itching, burning, rashes, or lesions     Vital Signs Last 24 Hrs  T(C): 36.9 (14 Oct 2018 11:08), Max: 37.3 (13 Oct 2018 15:14)  T(F): 98.4 (14 Oct 2018 11:08), Max: 99.2 (13 Oct 2018 15:14)  HR: 75 (14 Oct 2018 11:08) (75 - 82)  BP: 121/67 (14 Oct 2018 11:08) (121/67 - 135/71)  BP(mean): --  RR: 18 (14 Oct 2018 11:08) (18 - 18)  SpO2: 95% (14 Oct 2018 11:08) (93% - 96%)    PHYSICAL EXAM:  GENERAL: NAD, well-groomed, well-developed  HEAD:  Atraumatic, Normocephalic  EYES: EOMI, PERRLA, conjunctiva and sclera clear  NECK: Supple, No JVD, Normal thyroid  CHEST/LUNG: Clear to percussion bilaterally; No rales, rhonchi, wheezing, or rubs  HEART: Regular rate and rhythm; No murmurs, rubs, or gallops  ABDOMEN: Soft, Nontender, Nondistended; Bowel sounds present  NERVOUS SYSTEM:  Alert & Oriented X2  EXTREMITIES:  2+ Peripheral Pulses, No clubbing, cyanosis, or edema      LABS:                        9.9    5.5   )-----------( 215      ( 14 Oct 2018 07:49 )             30.9     10-14    139  |  105  |  21<H>  ----------------------------<  96  4.1   |  28  |  0.87    Ca    8.8      14 Oct 2018 07:49          CAPILLARY BLOOD GLUCOSE      POCT Blood Glucose.: 119 mg/dL (14 Oct 2018 12:01)  POCT Blood Glucose.: 105 mg/dL (14 Oct 2018 07:59)  POCT Blood Glucose.: 93 mg/dL (13 Oct 2018 20:58)  POCT Blood Glucose.: 108 mg/dL (13 Oct 2018 16:34)      RADIOLOGY & ADDITIONAL TESTS:    Imaging Personally Reviewed:  [ ] YES  [ ] NO    Consultant(s) Notes Reviewed:  [ ] YES  [ ] NO

## 2018-10-14 NOTE — PROGRESS NOTE ADULT - PROBLEM SELECTOR PLAN 2
CTA shows infrarenal AAA increased to 5.2cm from previous reading of 4.6cm  -vascular surgery consulted  recommend screening in relatives,   to assess for medical vs surgical management

## 2018-10-14 NOTE — PROGRESS NOTE ADULT - PROBLEM SELECTOR PLAN 7
IMPROVE VTE Individual Risk Assessment    RISK                                                          Points  [] Previous VTE                                           3  [] Thrombophilia                                        2  [] Lower limb paralysis                              2   [] Current Cancer                                       2   [x] Immobilization > 24 hrs                        1  [] ICU/CCU stay > 24 hours                       1  [x] Age > 60                                                   1    IMPROVE VTE Score: 2, DVT PPx w/ Lovenox
IMPROVE VTE Individual Risk Assessment    RISK                                                          Points  [] Previous VTE                                           3  [] Thrombophilia                                        2  [] Lower limb paralysis                              2   [] Current Cancer                                       2   [x] Immobilization > 24 hrs                        1  [] ICU/CCU stay > 24 hours                       1  [x] Age > 60                                                   1    IMPROVE VTE Score: 2, DVT PPx w/ Lovenox

## 2018-10-14 NOTE — PROGRESS NOTE ADULT - PROBLEM SELECTOR PLAN 1
Cardiac enzymes x 2 negative, not a candidate for ischemic eval 2/2 dementia  Echo with EF>75%, Gr1 DD  cta, elevated procalcitonin s/o b/l pna  c/w azithromycin, rocephin as per ID  -f/u PT eval  -d/c planning in AM to [facility] if stable  to f/u outpatient for lung nodule
Cardiac enzymes x 2 negative, not a candidate for ischemic eval 2/2 dementia  CTA negative for PE but shows b/l upper lobe nodules concerning for infection; procalcitonin 0.12  -C/w azithromycin, rocephin  -f/u echo  Cardio Dr Ordonez  Pulmonary Dr Marcelino  ID Dr Monson

## 2018-10-14 NOTE — PROGRESS NOTE ADULT - SUBJECTIVE AND OBJECTIVE BOX
Patient is a 83y old  Male who presents with a chief complaint of chest pain and cough (14 Oct 2018 10:35)    PATIENT IS SEEN AND EXAMINED IN MEDICAL FLOOR.    ALLERGIES:  No Known Allergies      Daily Weight in k.4 (14 Oct 2018 04:53)    VITALS:    Vital Signs Last 24 Hrs  T(C): 36.9 (14 Oct 2018 11:08), Max: 37.3 (13 Oct 2018 15:14)  T(F): 98.4 (14 Oct 2018 11:08), Max: 99.2 (13 Oct 2018 15:14)  HR: 75 (14 Oct 2018 11:08) (75 - 82)  BP: 121/67 (14 Oct 2018 11:08) (121/67 - 135/71)  BP(mean): --  RR: 18 (14 Oct 2018 11:08) (18 - 18)  SpO2: 95% (14 Oct 2018 11:08) (93% - 96%)    LABS:  CBC Full  -  ( 14 Oct 2018 07:49 )  WBC Count : 5.5 K/uL  Hemoglobin : 9.9 g/dL  Hematocrit : 30.9 %  Platelet Count - Automated : 215 K/uL  Mean Cell Volume : 102.9 fl  Mean Cell Hemoglobin : 32.9 pg  Mean Cell Hemoglobin Concentration : 32.0 gm/dL  Auto Neutrophil # : x  Auto Lymphocyte # : x  Auto Monocyte # : x  Auto Eosinophil # : x  Auto Basophil # : x  Auto Neutrophil % : x  Auto Lymphocyte % : x  Auto Monocyte % : x  Auto Eosinophil % : x  Auto Basophil % : x      10-14    139  |  105  |  21<H>  ----------------------------<  96  4.1   |  28  |  0.87    Ca    8.8      14 Oct 2018 07:49      CAPILLARY BLOOD GLUCOSE    POCT Blood Glucose.: 119 mg/dL (14 Oct 2018 12:01)  POCT Blood Glucose.: 105 mg/dL (14 Oct 2018 07:59)  POCT Blood Glucose.: 93 mg/dL (13 Oct 2018 20:58)  POCT Blood Glucose.: 108 mg/dL (13 Oct 2018 16:34)    .Blood Blood  10-11 @ 23:49   No growth to date.  --  --      MEDICATIONS:    MEDICATIONS  (STANDING):  aspirin enteric coated 81 milliGRAM(s) Oral daily  azithromycin  IVPB 500 milliGRAM(s) IV Intermittent every 24 hours  cefTRIAXone   IVPB 1 Gram(s) IV Intermittent every 24 hours  cholecalciferol 1000 Unit(s) Oral daily  ferrous    sulfate 325 milliGRAM(s) Oral daily  folic acid 1 milliGRAM(s) Oral daily  insulin lispro (HumaLOG) corrective regimen sliding scale   SubCutaneous Before meals and at bedtime  levETIRAcetam 500 milliGRAM(s) Oral two times a day  losartan 25 milliGRAM(s) Oral daily  metoprolol succinate ER 25 milliGRAM(s) Oral daily  PARoxetine 10 milliGRAM(s) Oral daily  simvastatin 10 milliGRAM(s) Oral at bedtime      MEDICATIONS  (PRN):  ALBUTerol    90 MICROgram(s) HFA Inhaler 2 Puff(s) Inhalation every 6 hours PRN Shortness of Breath and/or Wheezing      REVIEW OF SYSTEMS:                           ALL ROS DONE [ X   ]    CONSTITUTIONAL:  LETHARGIC [   ], FEVER [   ], UNRESPONSIVE [   ]  CVS:  CP  [   ], SOB, [   ], PALPITATIONS [   ], DIZZYNESS [   ]  RS: COUGH [   ], SPUTUM [   ]  GI: ABDOMINAL PAIN [   ], NAUSEA [   ], VOMITINGS [   ], DIARRHEA [   ], CONSTIPATION [   ]  :  DYSURIA [   ], NOCTURIA [   ], INCREASED FREQUENCY [   ], DRIBLING [   ],  SKELETAL: PAINFUL JOINTS [   ], SWOLLEN JOINTS [   ], NECK ACHE [   ], LOW BACK ACHE [   ],  SKIN : ULCERS [   ], RASH [   ], ITCHING [   ]  CNS: HEAD ACHE [   ], DOUBLE VISION [   ], BLURRED VISION [   ], AMS / CONFUSION [   ], SEIZURES [   ], WEAKNESS [   ],TINGLING / NUMBNESS [   ]    PHYSICAL EXAMINATION:  GENERAL APPEARANCE: NO DISTRESS  HEENT:  NO PALLOR, NO  JVD,  NO   NODES, NECK SUPPLE  CVS: S1 +, S2 +,   RS: AEEB,  OCCASIONAL  RALES +,   NO RONCHI  ABD: SOFT, NT, NO, BS +  EXT: NO PE  SKIN: WARM,   SKELETAL:  ROM ACCEPTABLE  CNS:  AAO X 1-2 , NO  DEFICITS    RADIOLOGY :    < from: CT Angio Chest w/ IV Cont (10.11.18 @ 13:51) >  IMPRESSION:   1.  No pulmonary embolism.  2.  Bilateral upper lobe, lingular, and right middle lobe predominant   tree-in-bud opacities and consolidations, likely infectious in etiology.   Differential considerations include atypical infection such as JAMESON.  3.  Interval increase in size of partially imaged infrarenal abdominal   aortic aneurysm, which currently measures 5.2 cm, previously 4.6 cm. This   may be due to differences in technique. Consider dedicated abdominal   imaging if clinically necessary.    < end of copied text >      ASSESSMENT :     Chest pain  Yes  Diabetes mellitus  Osteoporosis  Osteoarthritis  HTN (hypertension)  Depression  Anemia  Seizure  Dementia      PLAN:  HPI:  83 year old  male w/ PMH of Dementia, COPD, HTN, PVD, T2DM, and Urinary Incontinence presents from [facility] for further evaluation of chest pain and reported LUE pain - patient poor historian, AAOx1 (but usually knows where, unable to provider further HPI. In the ED patient seen in NAD coughing nonproductive at bedside - ED w/u included CTA negative for PE but noted b/l upper lobes, lingula and RML opacities concerning for infectious cause and XR of the LUE negative for acute fracture. Patient received dose of Azithromycin and Ceftriaxone along w/ 162 mg of ASA. Spoke w/ HCP Akiko Moon - updated about patient's status; DNR but okay with intubation. Family denied Hx of TB infection and came from [place] years ago -  phone number . (11 Oct 2018 17:48)    - DC P[BRIANNE IN AM IF STABLE  - CHEST PAIN - ACS IS RULED OUT . DC TELEMETRY  - GRADE 1 LV DIASTOLIC DYSFUNCTION  - B/L PNEUMONIA ON IV ROCEPHIN, AND AZITHROMYCIN  - B/L LUNG NODULES, COPD / SMOKER. CONTINUE NEBS. PULMONARY CONSULT TO EVALUATE B/L LUNG NODULES  - R/O LUNG CANCER. PULMONARY F/UP AS AN OUT PATIENT   - PT AND OT EVALUATION  - DC PLAN ON MONDAY BACK TO [facility]  - GI AND DVT PROPHYLAXIS  - DR. CARRENO Patient is a 83y old  Male who presents with a chief complaint of chest pain and cough (14 Oct 2018 10:35)    PATIENT IS SEEN AND EXAMINED IN MEDICAL FLOOR.    ALLERGIES:  No Known Allergies      Daily Weight in k.4 (14 Oct 2018 04:53)    VITALS:    Vital Signs Last 24 Hrs  T(C): 36.9 (14 Oct 2018 11:08), Max: 37.3 (13 Oct 2018 15:14)  T(F): 98.4 (14 Oct 2018 11:08), Max: 99.2 (13 Oct 2018 15:14)  HR: 75 (14 Oct 2018 11:08) (75 - 82)  BP: 121/67 (14 Oct 2018 11:08) (121/67 - 135/71)  BP(mean): --  RR: 18 (14 Oct 2018 11:08) (18 - 18)  SpO2: 95% (14 Oct 2018 11:08) (93% - 96%)    LABS:  CBC Full  -  ( 14 Oct 2018 07:49 )  WBC Count : 5.5 K/uL  Hemoglobin : 9.9 g/dL  Hematocrit : 30.9 %  Platelet Count - Automated : 215 K/uL  Mean Cell Volume : 102.9 fl  Mean Cell Hemoglobin : 32.9 pg  Mean Cell Hemoglobin Concentration : 32.0 gm/dL  Auto Neutrophil # : x  Auto Lymphocyte # : x  Auto Monocyte # : x  Auto Eosinophil # : x  Auto Basophil # : x  Auto Neutrophil % : x  Auto Lymphocyte % : x  Auto Monocyte % : x  Auto Eosinophil % : x  Auto Basophil % : x      10-14    139  |  105  |  21<H>  ----------------------------<  96  4.1   |  28  |  0.87    Ca    8.8      14 Oct 2018 07:49      CAPILLARY BLOOD GLUCOSE    POCT Blood Glucose.: 119 mg/dL (14 Oct 2018 12:01)  POCT Blood Glucose.: 105 mg/dL (14 Oct 2018 07:59)  POCT Blood Glucose.: 93 mg/dL (13 Oct 2018 20:58)  POCT Blood Glucose.: 108 mg/dL (13 Oct 2018 16:34)    .Blood Blood  10-11 @ 23:49   No growth to date.  --  --      MEDICATIONS:    MEDICATIONS  (STANDING):  aspirin enteric coated 81 milliGRAM(s) Oral daily  azithromycin  IVPB 500 milliGRAM(s) IV Intermittent every 24 hours  cefTRIAXone   IVPB 1 Gram(s) IV Intermittent every 24 hours  cholecalciferol 1000 Unit(s) Oral daily  ferrous    sulfate 325 milliGRAM(s) Oral daily  folic acid 1 milliGRAM(s) Oral daily  insulin lispro (HumaLOG) corrective regimen sliding scale   SubCutaneous Before meals and at bedtime  levETIRAcetam 500 milliGRAM(s) Oral two times a day  losartan 25 milliGRAM(s) Oral daily  metoprolol succinate ER 25 milliGRAM(s) Oral daily  PARoxetine 10 milliGRAM(s) Oral daily  simvastatin 10 milliGRAM(s) Oral at bedtime      MEDICATIONS  (PRN):  ALBUTerol    90 MICROgram(s) HFA Inhaler 2 Puff(s) Inhalation every 6 hours PRN Shortness of Breath and/or Wheezing      REVIEW OF SYSTEMS:                           ALL ROS DONE [ X   ]    CONSTITUTIONAL:  LETHARGIC [   ], FEVER [   ], UNRESPONSIVE [   ]  CVS:  CP  [   ], SOB, [   ], PALPITATIONS [   ], DIZZYNESS [   ]  RS: COUGH [   ], SPUTUM [   ]  GI: ABDOMINAL PAIN [   ], NAUSEA [   ], VOMITINGS [   ], DIARRHEA [   ], CONSTIPATION [   ]  :  DYSURIA [   ], NOCTURIA [   ], INCREASED FREQUENCY [   ], DRIBLING [   ],  SKELETAL: PAINFUL JOINTS [   ], SWOLLEN JOINTS [   ], NECK ACHE [   ], LOW BACK ACHE [   ],  SKIN : ULCERS [   ], RASH [   ], ITCHING [   ]  CNS: HEAD ACHE [   ], DOUBLE VISION [   ], BLURRED VISION [   ], AMS / CONFUSION [   ], SEIZURES [   ], WEAKNESS [   ],TINGLING / NUMBNESS [   ]    PHYSICAL EXAMINATION:  GENERAL APPEARANCE: NO DISTRESS  HEENT:  NO PALLOR, NO  JVD,  NO   NODES, NECK SUPPLE  CVS: S1 +, S2 +,   RS: AEEB,  OCCASIONAL  RALES +,   NO RONCHI  ABD: SOFT, NT, NO, BS +  EXT: NO PE  SKIN: WARM,   SKELETAL:  ROM ACCEPTABLE  CNS:  AAO X 1-2 , NO  DEFICITS    RADIOLOGY :    < from: CT Angio Chest w/ IV Cont (10.11.18 @ 13:51) >  IMPRESSION:   1.  No pulmonary embolism.  2.  Bilateral upper lobe, lingular, and right middle lobe predominant   tree-in-bud opacities and consolidations, likely infectious in etiology.   Differential considerations include atypical infection such as JAMESON.  3.  Interval increase in size of partially imaged infrarenal abdominal   aortic aneurysm, which currently measures 5.2 cm, previously 4.6 cm. This   may be due to differences in technique. Consider dedicated abdominal   imaging if clinically necessary.    < end of copied text >      ASSESSMENT :     Chest pain  Yes  Diabetes mellitus  Osteoporosis  Osteoarthritis  HTN (hypertension)  Depression  Anemia  Seizure  Dementia      PLAN:  HPI:  83 year old  male w/ PMH of Dementia, COPD, HTN, PVD, T2DM, and Urinary Incontinence presents from [facility] for further evaluation of chest pain and reported LUE pain - patient poor historian, JACKIEOx1 (but usually knows where, unable to provider further HPI. In the ED patient seen in NAD coughing nonproductive at bedside - ED w/u included CTA negative for PE but noted b/l upper lobes, lingula and RML opacities concerning for infectious cause and XR of the LUE negative for acute fracture. Patient received dose of Azithromycin and Ceftriaxone along w/ 162 mg of ASA. Spoke w/ HCP Akiko Moon - updated about patient's status; DNR but okay with intubation. Family denied Hx of TB infection and came from [place] years ago -  phone number . (11 Oct 2018 17:48)    - DC P[BRIANNE IN AM IF STABLE  - CHEST PAIN - ACS IS RULED OUT . DC TELEMETRY  - GRADE 1 LV DIASTOLIC DYSFUNCTION  - B/L PNEUMONIA ON IV ROCEPHIN, AND AZITHROMYCIN  - B/L LUNG NODULES, COPD / SMOKER. CONTINUE NEBS. PULMONARY CONSULT TO EVALUATE B/L LUNG NODULES  - R/O LUNG CANCER. PULMONARY F/UP AS AN OUT PATIENT   - ABDOMINAL AORTIC ANEURYSM ABOUT 5 CMS. VASCULAR F/UP IS IN PROGRESS  - PT AND OT EVALUATION  - DC PLAN ON MONDAY BACK TO [facility]  - GI AND DVT PROPHYLAXIS  - DR. CARRENO

## 2018-10-14 NOTE — PROGRESS NOTE ADULT - SUBJECTIVE AND OBJECTIVE BOX
83 years old male is under our care for bilateral pneumonia, leukocytosis (resolved). No overnight events, pt is in bed, no distress. Pt has wet cough, remains afebrile, no leukocytosis, blood cultures from 10/11/18 with no growth. Pt denies having shortness of breath or chest pain, no abdominal pain, no nausea, vomiting or diarrhea.      MEDS:  azithromycin  IVPB 500 milliGRAM(s) IV Intermittent every 24 hours  cefTRIAXone   IVPB 1 Gram(s) IV Intermittent every 24 hours    No Known Allergies    VITALS:  Vital Signs Last 24 Hrs  T(C): 36.8 (14 Oct 2018 07:13), Max: 37.3 (13 Oct 2018 15:14)  T(F): 98.3 (14 Oct 2018 07:13), Max: 99.2 (13 Oct 2018 15:14)  HR: 75 (14 Oct 2018 07:13) (75 - 82)  BP: 124/79 (14 Oct 2018 07:13) (113/65 - 135/71)  BP(mean): --  RR: 18 (14 Oct 2018 07:13) (18 - 18)  SpO2: 95% (14 Oct 2018 07:13) (93% - 96%)    LABS/DIAGNOSTIC TESTS:                          9.9    5.5   )-----------( 215      ( 14 Oct 2018 07:49 )             30.9         10-14    139  |  105  |  21<H>  ----------------------------<  96  4.1   |  28  |  0.87    Ca    8.8      14 Oct 2018 07:49        CULTURES:   .Blood Blood  10-11 @ 23:49   No growth to date.  --  --      < from: Transthoracic Echocardiogram (10.12.18 @ 07:45) >    Patient name: MICKY TORRES  YOB: 1935   Age: 83 (M)   MR#: [id number]  Study Date: 10/12/2018  Location: [address] [place], [place] [address]Sonographer: Shilo cMghee SANDRA  Study quality: Fair  Referring Physician:  WAI UREÑA MD  Blood Pressure: 109/47 mmHg  Height: 172 cm  Weight: 68 kg  BSA: 1.8 m2  ------------------------------------------------------------------------    PROCEDURE: Transthoracic echocardiogram with 2-D, M-Mode  and complete spectral and color flow Doppler.  INDICATION: Abnormal electrocardiogram [ECG] [EKG] (R94.31)  HISTORY:  ------------------------------------------------------------------------  DIMENSIONS:  Dimensions:     Normal Values:  LA:     3.0 cm    2.0 - 4.0 cm  Ao:     3.9 cm    2.0 - 3.8 cm  SEPTUM: 0.9 cm    0.6 - 1.2 cm  PWT:    0.8 cm    0.6 - 1.1 cm  LVIDd:  4.6 cm    3.0 - 5.6 cm  LVIDs:  2.8 cm    1.8 - 4.0 cm      Derived Variables:  LVMI: 71 g/m2  RWT: 0.34    ------------------------------------------------------------------------  OBSERVATIONS:  Mitral Valve: Normal mitral valve. Trace mitral  regurgitation.  Aortic Root: Normal aortic root.  Aortic Valve: Normal trileaflet aortic valve. No aortic  stenosis. Trace aortic regurgitation.  Left Atrium: Normal left atrium.  LA volume index = 15  cc/m2.  Left Ventricle: Hyperdynamic left ventricular systolic  function (EF >70%). Not all LV wall segments were seen.  Normal left ventricular internal dimensions and wall  thicknesses. Grade I diastolic dysfunction (Impaired  relaxation).  Right Heart: Right atrium not well visualized. Right  ventricle not well visualized. Normal RV systolic function  (TAPSE 1.7 cm). Normal tricuspid valve. Trace tricuspid  regurgitation. Pulmonic valve not well seen. Trace pulmonic  insufficiency is noted.  Pericardium/PleuraNo pericardial effusion.  Hemodynamic: RA Pressure is 3 mm Hg. Insufficient tricuspid  regurgitation jet to allow calculation of RVSP.  ------------------------------------------------------------------------  CONCLUSIONS:  1. Normal mitral valve. Trace mitral regurgitation.  2. Normal trileaflet aortic valve. No aortic stenosis.  Trace aortic regurgitation.  3. Normal aortic root.  4. Normal left atrium.  5. Normal left ventricular internal dimensions and wall  thicknesses.  6. Hyperdynamic left ventricular systolic function (EF  >70%).  7. Grade I diastolic dysfunction (Impaired relaxation).  8. Right ventricle not well visualized. Normal RV systolic  function (TAPSE 1.7 cm).  9. Normal tricuspid valve. Trace tricuspid regurgitation.  10. Pulmonic valve not well seen. Trace pulmonic  insufficiency is noted.  11. No pericardial effusion.    *** Compared with echocardiogram report of 10/12/2016, no  significant changes noted.  ------------------------------------------------------------------------  Confirmed on  10/12/2018 - 18:37:30 by Javier Moncada MD    < end of copied text >

## 2018-10-14 NOTE — PROGRESS NOTE ADULT - SUBJECTIVE AND OBJECTIVE BOX
Subjective: No chest pain or sob   	    ALBUTerol    90 MICROgram(s) HFA Inhaler 2 Puff(s) Inhalation every 6 hours PRN  aspirin enteric coated 81 milliGRAM(s) Oral daily  azithromycin  IVPB 500 milliGRAM(s) IV Intermittent every 24 hours  cefTRIAXone   IVPB 1 Gram(s) IV Intermittent every 24 hours  cholecalciferol 1000 Unit(s) Oral daily  ferrous    sulfate 325 milliGRAM(s) Oral daily  folic acid 1 milliGRAM(s) Oral daily  insulin lispro (HumaLOG) corrective regimen sliding scale   SubCutaneous Before meals and at bedtime  levETIRAcetam 500 milliGRAM(s) Oral two times a day  losartan 25 milliGRAM(s) Oral daily  metoprolol succinate ER 25 milliGRAM(s) Oral daily  PARoxetine 10 milliGRAM(s) Oral daily  simvastatin 10 milliGRAM(s) Oral at bedtime                            9.9    5.5   )-----------( 215      ( 14 Oct 2018 07:49 )             30.9       10-14    139  |  105  |  21<H>  ----------------------------<  96  4.1   |  28  |  0.87    Ca    8.8      14 Oct 2018 07:49        CARDIAC MARKERS ( 12 Oct 2018 07:10 )  <0.015 ng/mL / x     / 170 U/L / x     / 1.5 ng/mL  CARDIAC MARKERS ( 11 Oct 2018 20:21 )  <0.015 ng/mL / x     / 187 U/L / x     / 1.4 ng/mL  CARDIAC MARKERS ( 11 Oct 2018 12:34 )  <0.015 ng/mL / x     / x     / x     / x            T(C): 36.8 (10-14-18 @ 07:13), Max: 37.3 (10-13-18 @ 15:14)  HR: 75 (10-14-18 @ 07:13) (75 - 82)  BP: 124/79 (10-14-18 @ 07:13) (113/65 - 135/71)  RR: 18 (10-14-18 @ 07:13) (18 - 18)  SpO2: 95% (10-14-18 @ 07:13) (93% - 96%)  Wt(kg): --    I&O's Summary        Heart: normal S1, S2, RRR, no m/r/g  Lungs: cta b/l  Abd: soft nT  Ext: no edema     TELEMETRY: no tele    ECG:  	  RADIOLOGY:   DIAGNOSTIC TESTING:  [ ] Echocardiogram: < from: Transthoracic Echocardiogram (10.12.18 @ 07:45) >  CONCLUSIONS:  1. Normal mitral valve. Trace mitral regurgitation.  2. Normal trileaflet aortic valve. No aortic stenosis.  Trace aortic regurgitation.  3. Normal aortic root.  4. Normal left atrium.  5. Normal left ventricular internal dimensions and wall  thicknesses.  6. Hyperdynamic left ventricular systolic function (EF  >70%).  7. Grade I diastolic dysfunction (Impaired relaxation).  8. Right ventricle not well visualized. Normal RV systolic  function (TAPSE 1.7 cm).  9. Normal tricuspid valve. Trace tricuspid regurgitation.  10. Pulmonic valve not well seen. Trace pulmonic  insufficiency is noted.  11. No pericardial effusion.    < end of copied text >    [ ]  Catheterization:  [ ] Stress Test:    OTHER: 	      ASSESSMENT/PLAN: 83y Male Dementia, COPD, HTN, PVD, T2DM, and Urinary Incontinence presents from [facility] for further evaluation of chest pain.    -TTE with normal LV function  -not a good candidate for invasive ischemic eval given advanced dementia   -continue with BB  -follow up vascular     Mauri Ji MD

## 2018-10-14 NOTE — PROGRESS NOTE ADULT - SUBJECTIVE AND OBJECTIVE BOX
Patient examined at bedside, no overnight events    T(C): 36.8 (10-14-18 @ 07:13), Max: 37.3 (10-13-18 @ 15:14)  HR: 75 (10-14-18 @ 07:13) (75 - 82)  BP: 124/79 (10-14-18 @ 07:13) (113/65 - 135/71)  RR: 18 (10-14-18 @ 07:13) (18 - 18)  SpO2: 95% (10-14-18 @ 07:13) (93% - 96%)  Wt(kg): --    Physical Exam  General: AAOx3, No acute distress  Skin: No jaundice, no icterus  Abdomen: soft, nontender, nondistended, no pulsatile mass  Extremities: non edematous, no calf pain bilaterally                          9.9    5.5   )-----------( 215      ( 14 Oct 2018 07:49 )             30.9   10-14    139  |  105  |  21<H>  ----------------------------<  96  4.1   |  28  |  0.87    Ca    8.8      14 Oct 2018 07:49

## 2018-10-15 ENCOUNTER — TRANSCRIPTION ENCOUNTER (OUTPATIENT)
Age: 83
End: 2018-10-15

## 2018-10-15 VITALS
HEART RATE: 80 BPM | DIASTOLIC BLOOD PRESSURE: 63 MMHG | OXYGEN SATURATION: 95 % | SYSTOLIC BLOOD PRESSURE: 110 MMHG | RESPIRATION RATE: 18 BRPM | TEMPERATURE: 97 F

## 2018-10-15 LAB
GLUCOSE BLDC GLUCOMTR-MCNC: 107 MG/DL — HIGH (ref 70–99)
GLUCOSE BLDC GLUCOMTR-MCNC: 107 MG/DL — HIGH (ref 70–99)

## 2018-10-15 RX ORDER — CEFUROXIME AXETIL 250 MG
1 TABLET ORAL
Qty: 8 | Refills: 0 | OUTPATIENT
Start: 2018-10-15 | End: 2018-10-18

## 2018-10-15 RX ORDER — METOPROLOL TARTRATE 50 MG
1 TABLET ORAL
Qty: 0 | Refills: 0 | COMMUNITY
Start: 2018-10-15

## 2018-10-15 RX ADMIN — Medication 25 MILLIGRAM(S): at 05:46

## 2018-10-15 RX ADMIN — Medication 10 MILLIGRAM(S): at 12:08

## 2018-10-15 RX ADMIN — Medication 81 MILLIGRAM(S): at 12:08

## 2018-10-15 RX ADMIN — Medication 1 MILLIGRAM(S): at 12:09

## 2018-10-15 RX ADMIN — LOSARTAN POTASSIUM 25 MILLIGRAM(S): 100 TABLET, FILM COATED ORAL at 05:46

## 2018-10-15 RX ADMIN — Medication 325 MILLIGRAM(S): at 12:08

## 2018-10-15 RX ADMIN — Medication 1000 UNIT(S): at 12:08

## 2018-10-15 RX ADMIN — LEVETIRACETAM 500 MILLIGRAM(S): 250 TABLET, FILM COATED ORAL at 05:46

## 2018-10-15 NOTE — PROGRESS NOTE ADULT - PROVIDER SPECIALTY LIST ADULT
Cardiology
Cardiology
Internal Medicine
Pulmonology
Vascular Surgery
Cardiology
Internal Medicine
Infectious Disease

## 2018-10-15 NOTE — DISCHARGE NOTE ADULT - PLAN OF CARE
Resolution of infection You presented with chest pain for which an extensive workup was done. Your ekg was normal, cardiac enzymes trended negative, ischemia was ruled out. A CT scan of your chest was negative for pulmonary embolism, however You were found to have multiple nodules in both lungs on CT scan imaging study. FOllow up with pulmonologist as outpatient to find the cause for the findings and management. Monitor You were found to have increase in abdominal aortic aneurysm on imaging to 5.2cm. You are asymptomatic, were seen by vascular surgery. You are advised to maintain blood pressure less than 130/80 mm Hg. Follow up with your primary doctor on a regular basis. Hemoglobin 13-17 Your current hemoglobin level is 9. Continue with iron and folic acid supplements. Follow up with your primary doctor. You presented with chest pain for which an extensive workup was done. Your ekg was normal, cardiac enzymes trended negative, ischemia was ruled out. A CT scan of your chest was negative for pulmonary embolism, however it was suggestive of pneumonia for which you were started on iv antibiotics. Continue with ceftin 250mg two times a day for 4 more days. Follow up with your primary doctor in 5-7 days for evaluation of your progress. You were found to have multiple nodules in both lungs on CT scan imaging study. Follow up with pulmonologist as outpatient to find the cause for the findings and management. You presented with chest pain for which an extensive workup was done. Your ekg was normal, cardiac enzymes trended negative, ischemia was ruled out. A CT scan of your chest was negative for pulmonary embolism, however it was suggestive of pneumonia for which you were started on iv antibiotics. Continue with ceftin 250mg two times a day for 4 more days. You were also evaluated by physiotherapist who recommended Home PT. Follow up with your primary doctor in 5-7 days for evaluation of your progress. You were found to have increase in abdominal aortic aneurysm on imaging to 5.2cm. You are asymptomatic, were seen by vascular surgery. You are advised to maintain blood pressure less than 130/80 mm Hg. Follow up with Dr Zhang as outpatient. Goal blood pressure <130/80mm Hg Your blood pressure was stable on cozaar at the hospital. Continue with the same. Monitor your blood pressure on a regular basis, follow up with your primary doctor.

## 2018-10-15 NOTE — DISCHARGE NOTE ADULT - CARE PROVIDER_API CALL
Jorge Crooks (MD), Medicine  [address] [place] [address]  Phone: [phone]  Fax: [phone] Jorge Crooks), Medicine  [address] [address] [place] [address]  Phone: [phone]  Fax: [phone]    Gabriel Zhang), Vascular Surgery  [address] [facility] [address] [place] [address]  Phone: [phone]  Fax: [phone]

## 2018-10-15 NOTE — PROGRESS NOTE ADULT - REASON FOR ADMISSION
chest pain and cough

## 2018-10-15 NOTE — DISCHARGE NOTE ADULT - CARE PLAN
Principal Discharge DX:	Pneumonia  Goal:	Resolution of infection  Assessment and plan of treatment:	You presented with chest pain for which an extensive workup was done. Your ekg was normal, cardiac enzymes trended negative, ischemia was ruled out. A CT scan of your chest was negative for pulmonary embolism, however  Secondary Diagnosis:	Lung nodule seen on imaging study  Assessment and plan of treatment:	You were found to have multiple nodules in both lungs on CT scan imaging study. FOllow up with pulmonologist as outpatient to find the cause for the findings and management.  Secondary Diagnosis:	Abdominal aortic aneurysm (AAA)  Goal:	Monitor  Assessment and plan of treatment:	You were found to have increase in abdominal aortic aneurysm on imaging to 5.2cm. You are asymptomatic, were seen by vascular surgery. You are advised to maintain blood pressure less than 130/80 mm Hg. Follow up with your primary doctor on a regular basis.  Secondary Diagnosis:	Anemia  Goal:	Hemoglobin 13-17  Assessment and plan of treatment:	Your current hemoglobin level is 9. Continue with iron and folic acid supplements. Follow up with your primary doctor. Principal Discharge DX:	Pneumonia  Goal:	Resolution of infection  Assessment and plan of treatment:	You presented with chest pain for which an extensive workup was done. Your ekg was normal, cardiac enzymes trended negative, ischemia was ruled out. A CT scan of your chest was negative for pulmonary embolism, however it was suggestive of pneumonia for which you were started on iv antibiotics. Continue with ceftin 250mg two times a day for 4 more days. Follow up with your primary doctor in 5-7 days for evaluation of your progress.  Secondary Diagnosis:	Lung nodule seen on imaging study  Assessment and plan of treatment:	You were found to have multiple nodules in both lungs on CT scan imaging study. Follow up with pulmonologist as outpatient to find the cause for the findings and management.  Secondary Diagnosis:	Abdominal aortic aneurysm (AAA)  Goal:	Monitor  Assessment and plan of treatment:	You were found to have increase in abdominal aortic aneurysm on imaging to 5.2cm. You are asymptomatic, were seen by vascular surgery. You are advised to maintain blood pressure less than 130/80 mm Hg. Follow up with your primary doctor on a regular basis.  Secondary Diagnosis:	Anemia  Goal:	Hemoglobin 13-17  Assessment and plan of treatment:	Your current hemoglobin level is 9. Continue with iron and folic acid supplements. Follow up with your primary doctor. Principal Discharge DX:	Pneumonia  Goal:	Resolution of infection  Assessment and plan of treatment:	You presented with chest pain for which an extensive workup was done. Your ekg was normal, cardiac enzymes trended negative, ischemia was ruled out. A CT scan of your chest was negative for pulmonary embolism, however it was suggestive of pneumonia for which you were started on iv antibiotics. Continue with ceftin 250mg two times a day for 4 more days. You were also evaluated by physiotherapist who recommended Home PT. Follow up with your primary doctor in 5-7 days for evaluation of your progress.  Secondary Diagnosis:	Lung nodule seen on imaging study  Assessment and plan of treatment:	You were found to have multiple nodules in both lungs on CT scan imaging study. Follow up with pulmonologist as outpatient to find the cause for the findings and management.  Secondary Diagnosis:	Abdominal aortic aneurysm (AAA)  Goal:	Monitor  Assessment and plan of treatment:	You were found to have increase in abdominal aortic aneurysm on imaging to 5.2cm. You are asymptomatic, were seen by vascular surgery. You are advised to maintain blood pressure less than 130/80 mm Hg. Follow up with Dr Zhang as outpatient.  Secondary Diagnosis:	Anemia  Goal:	Hemoglobin 13-17  Assessment and plan of treatment:	Your current hemoglobin level is 9. Continue with iron and folic acid supplements. Follow up with your primary doctor. Principal Discharge DX:	Pneumonia  Goal:	Resolution of infection  Assessment and plan of treatment:	You presented with chest pain for which an extensive workup was done. Your ekg was normal, cardiac enzymes trended negative, ischemia was ruled out. A CT scan of your chest was negative for pulmonary embolism, however it was suggestive of pneumonia for which you were started on iv antibiotics. Continue with ceftin 250mg two times a day for 4 more days. You were also evaluated by physiotherapist who recommended Home PT. Follow up with your primary doctor in 5-7 days for evaluation of your progress.  Secondary Diagnosis:	Lung nodule seen on imaging study  Assessment and plan of treatment:	You were found to have multiple nodules in both lungs on CT scan imaging study. Follow up with pulmonologist as outpatient to find the cause for the findings and management.  Secondary Diagnosis:	Abdominal aortic aneurysm (AAA)  Goal:	Monitor  Assessment and plan of treatment:	You were found to have increase in abdominal aortic aneurysm on imaging to 5.2cm. You are asymptomatic, were seen by vascular surgery. You are advised to maintain blood pressure less than 130/80 mm Hg. Follow up with Dr Zhang as outpatient.  Secondary Diagnosis:	Anemia  Goal:	Hemoglobin 13-17  Assessment and plan of treatment:	Your current hemoglobin level is 9. Continue with iron and folic acid supplements. Follow up with your primary doctor.  Secondary Diagnosis:	HTN (hypertension)  Goal:	Goal blood pressure <130/80mm Hg  Assessment and plan of treatment:	Your blood pressure was stable on cozaar at the hospital. Continue with the same. Monitor your blood pressure on a regular basis, follow up with your primary doctor.

## 2018-10-15 NOTE — DISCHARGE NOTE ADULT - HOSPITAL COURSE
83 year old  male w/ PMH of Dementia, COPD, HTN, PVD, T2DM, and Urinary Incontinence presents from [facility] for further evaluation of chest pain and reported LUE pain - admitting for further evaluation w/ differentials including PNA, ACS, and malignancy.  Inpatient: EKG was wnl, cardiac enzymes negative, acs was ruled out. Patient had CTA done which ruled out PE but s/o b/l pulmonary infiltrates/nodules s/o pna/malignancy. He was started on iv rocephin and azithromycin. To go back to [facility] on ceftin for 4 more days. Patient also found to have AAA on CTA, evaluated by vascular surgery who recommend maintain BP well maintained, and screen patient's relatives.    Patient clinically stable for discharge to [facility] as per attending. 83 year old  male w/ PMH of Dementia, COPD, HTN, PVD, T2DM, and Urinary Incontinence presents from [facility] for further evaluation of chest pain and reported LUE pain - admitting for further evaluation w/ differentials including PNA, ACS, and malignancy.  Inpatient: EKG was wnl, cardiac enzymes negative, acs was ruled out. Patient had CTA done which ruled out PE but s/o b/l pulmonary infiltrates/nodules s/o pna/malignancy. He was started on iv rocephin and azithromycin. To go back to [facility] on ceftin for 4 more days. Patient also found to have AAA on CTA, evaluated by vascular surgery who recommend maintain BP well maintained, and screen patient's relatives. To f/u as outpatient. PT evaluation rec Home with home PT.  Patient clinically stable for discharge to [facility] as per attending.

## 2018-10-15 NOTE — PROGRESS NOTE ADULT - SUBJECTIVE AND OBJECTIVE BOX
Time of Visit:  Patient seen and examined.     MEDICATIONS  (STANDING):  aspirin enteric coated 81 milliGRAM(s) Oral daily  azithromycin  IVPB 500 milliGRAM(s) IV Intermittent every 24 hours  cefTRIAXone   IVPB 1 Gram(s) IV Intermittent every 24 hours  cholecalciferol 1000 Unit(s) Oral daily  ferrous    sulfate 325 milliGRAM(s) Oral daily  folic acid 1 milliGRAM(s) Oral daily  insulin lispro (HumaLOG) corrective regimen sliding scale   SubCutaneous Before meals and at bedtime  levETIRAcetam 500 milliGRAM(s) Oral two times a day  losartan 25 milliGRAM(s) Oral daily  metoprolol succinate ER 25 milliGRAM(s) Oral daily  PARoxetine 10 milliGRAM(s) Oral daily  simvastatin 10 milliGRAM(s) Oral at bedtime      MEDICATIONS  (PRN):  ALBUTerol    90 MICROgram(s) HFA Inhaler 2 Puff(s) Inhalation every 6 hours PRN Shortness of Breath and/or Wheezing       Medications up to date at time of exam.    ROS: No fever, chills, cough, congestion.  PHYSICAL EXAMINATION:  Vital Signs Last 24 Hrs  T(C): 36.3 (15 Oct 2018 11:41), Max: 37 (15 Oct 2018 04:41)  T(F): 97.4 (15 Oct 2018 11:41), Max: 98.6 (15 Oct 2018 04:41)  HR: 80 (15 Oct 2018 11:41) (73 - 80)  BP: 110/63 (15 Oct 2018 11:41) (110/63 - 139/76)  BP(mean): --  RR: 18 (15 Oct 2018 11:41) (18 - 18)  SpO2: 95% (15 Oct 2018 11:41) (95% - 99%)   (if applicable)    General: Alert and oriented. No acute distress.     HEENT: Head is normocephalic and atraumatic. Extraocular muscles are intact. Moist mucosa.     NECK: Supple, no palpable adenopathy.    LUNGS: Clear to auscultation, no wheezing, rales, or rhonchi. No use of accessory muscle.     HEART: S1 S2 Regular rate and no click/ rub.     ABDOMEN: Soft, nontender, and nondistended.  No hepatosplenomegaly is noted. Active bowel sounds.     EXTREMITIES: Without any cyanosis, clubbing, rash, lesions or edema.    NEUROLOGIC: Awake, alert, oriented.     SKIN: Warm and moist. Non diaphoretic.       LABS:                        9.9    5.5   )-----------( 215      ( 14 Oct 2018 07:49 )             30.9     10-14    139  |  105  |  21<H>  ----------------------------<  96  4.1   |  28  |  0.87    Ca    8.8      14 Oct 2018 07:49        RADIOLOGY & ADDITIONAL STUDIES:  CXR: < from: Xray Chest 1 View AP/PA (10.11.18 @ 12:10) >  INTERPRETATION:  CLINICAL STATEMENT: Chest pain.    TECHNIQUE: AP view of the chest.    COMPARISON: 10/10/2016    FINDINGS/  IMPRESSION:  Questionable new focal density left perihilar region. CT angiogram chest   order already present. This could be better evaluated on the CT exam.    No pleural effusion. Biapical pleural thickening.    Chronic left upper rib deformity.    Heart size within normal limits.    Stable chronic mild compression deformity mid thoracic spine        < end of copied text >     Ct chest: < from: CT Angio Chest w/ IV Cont (10.11.18 @ 13:51) >    EXAM:  CT ANGIO CHEST (W)AW IC                            PROCEDURE DATE:  10/11/2018          INTERPRETATION:  CLINICAL INFORMATION: Chest pain.    COMPARISON: Chest CT 10/14/2016.    PROCEDURE:   CT of the Chest was performed without intravenous contrast.  Sagittal and coronal reformats were performed.      FINDINGS:    CHEST:     LUNGS AND LARGE AIRWAYS: Patent central airways. Bilateral upper lobe,   lingular, and right middle lobe predominant tree-in-bud opacities and   patchy consolidations.  PLEURA: Biapical pleural-parenchymal scarring   VESSELS: Good opacification of the pulmonary arterial tree. No pulmonary   embolism.   HEART: Heart size is normal.No pericardial effusion.  MEDIASTINUM AND FUAD: No lymphadenopathy.  CHEST WALL ANDLOWER NECK: Within normal limits.  VISUALIZED UPPER ABDOMEN: Partially imaged infrarenal abdominal aortic   aneurysm measures 5.2 cm, previously 4.6 cm. Mildly thickened left   adrenal gland.  BONES: Sclerosis and cortical thickening at the posteriorright eighth   rib, unchanged. Chronic fracture deformity of the left scapula. Mild   compression deformities of T3, T8, and T9, unchanged from 10/14/2016.    IMPRESSION:   1.  No pulmonary embolism.  2.  Bilateral upper lobe, lingular, and right middle lobe predominant   tree-in-bud opacities and consolidations, likely infectious in etiology.   Differential considerations include atypical infection such as JAMESON.  3.  Interval increase in size of partially imaged infrarenal abdominal   aortic aneurysm, which currently measures 5.2 cm, previously 4.6 cm. This   may be due to differences in technique. Consider dedicated abdominal   imaging if clinically necessary.      IMPRESSION: 83y Male PAST MEDICAL & SURGICAL HISTORY:  Diabetes mellitus  Osteoporosis  Osteoarthritis  HTN (hypertension)  Depression  Anemia  Seizure  Dementia  No significant past surgical history    Impression; 84 Y/O Male with prior mentioned multiple chronic conditions. Presented with chest pain and cough secondary to Pneumonia. CT chest showed infectious processes and an increase in size in infrarenal aneurysm . No PE. BLD Cx x 2 negative.    Suggestion;  Continue PRN Albuterol.  Reinforced compliance to daily medications.    Pulmonary follow up outpatient. Time of Visit:  Patient seen and examined.     MEDICATIONS  (STANDING):  aspirin enteric coated 81 milliGRAM(s) Oral daily  azithromycin  IVPB 500 milliGRAM(s) IV Intermittent every 24 hours  cefTRIAXone   IVPB 1 Gram(s) IV Intermittent every 24 hours  cholecalciferol 1000 Unit(s) Oral daily  ferrous    sulfate 325 milliGRAM(s) Oral daily  folic acid 1 milliGRAM(s) Oral daily  insulin lispro (HumaLOG) corrective regimen sliding scale   SubCutaneous Before meals and at bedtime  levETIRAcetam 500 milliGRAM(s) Oral two times a day  losartan 25 milliGRAM(s) Oral daily  metoprolol succinate ER 25 milliGRAM(s) Oral daily  PARoxetine 10 milliGRAM(s) Oral daily  simvastatin 10 milliGRAM(s) Oral at bedtime      MEDICATIONS  (PRN):  ALBUTerol    90 MICROgram(s) HFA Inhaler 2 Puff(s) Inhalation every 6 hours PRN Shortness of Breath and/or Wheezing       Medications up to date at time of exam.    ROS: No fever, chills, cough, congestion.  PHYSICAL EXAMINATION:  Vital Signs Last 24 Hrs  T(C): 36.3 (15 Oct 2018 11:41), Max: 37 (15 Oct 2018 04:41)  T(F): 97.4 (15 Oct 2018 11:41), Max: 98.6 (15 Oct 2018 04:41)  HR: 80 (15 Oct 2018 11:41) (73 - 80)  BP: 110/63 (15 Oct 2018 11:41) (110/63 - 139/76)  BP(mean): --  RR: 18 (15 Oct 2018 11:41) (18 - 18)  SpO2: 95% (15 Oct 2018 11:41) (95% - 99%)   (if applicable)    General: Alert and oriented. No acute distress.     HEENT: Head is normocephalic and atraumatic. Extraocular muscles are intact. Moist mucosa.     NECK: Supple, no palpable adenopathy.    LUNGS: Clear to auscultation, no wheezing, rales, or rhonchi. No use of accessory muscle.     HEART: S1 S2 Regular rate and no click/ rub.     ABDOMEN: Soft, nontender, and nondistended.  No hepatosplenomegaly is noted. Active bowel sounds.     EXTREMITIES: Without any cyanosis, clubbing, rash, lesions or edema.    NEUROLOGIC: Awake, alert, oriented.     SKIN: Warm and moist. Non diaphoretic.       LABS:                        9.9    5.5   )-----------( 215      ( 14 Oct 2018 07:49 )             30.9     10-14    139  |  105  |  21<H>  ----------------------------<  96  4.1   |  28  |  0.87    Ca    8.8      14 Oct 2018 07:49        RADIOLOGY & ADDITIONAL STUDIES:  CXR: < from: Xray Chest 1 View AP/PA (10.11.18 @ 12:10) >  INTERPRETATION:  CLINICAL STATEMENT: Chest pain.    TECHNIQUE: AP view of the chest.    COMPARISON: 10/10/2016    FINDINGS/  IMPRESSION:  Questionable new focal density left perihilar region. CT angiogram chest   order already present. This could be better evaluated on the CT exam.    No pleural effusion. Biapical pleural thickening.    Chronic left upper rib deformity.    Heart size within normal limits.    Stable chronic mild compression deformity mid thoracic spine        < end of copied text >     Ct chest: < from: CT Angio Chest w/ IV Cont (10.11.18 @ 13:51) >    EXAM:  CT ANGIO CHEST (W)AW IC                            PROCEDURE DATE:  10/11/2018          INTERPRETATION:  CLINICAL INFORMATION: Chest pain.    COMPARISON: Chest CT 10/14/2016.    PROCEDURE:   CT of the Chest was performed without intravenous contrast.  Sagittal and coronal reformats were performed.      FINDINGS:    CHEST:     LUNGS AND LARGE AIRWAYS: Patent central airways. Bilateral upper lobe,   lingular, and right middle lobe predominant tree-in-bud opacities and   patchy consolidations.  PLEURA: Biapical pleural-parenchymal scarring   VESSELS: Good opacification of the pulmonary arterial tree. No pulmonary   embolism.   HEART: Heart size is normal.No pericardial effusion.  MEDIASTINUM AND FUAD: No lymphadenopathy.  CHEST WALL ANDLOWER NECK: Within normal limits.  VISUALIZED UPPER ABDOMEN: Partially imaged infrarenal abdominal aortic   aneurysm measures 5.2 cm, previously 4.6 cm. Mildly thickened left   adrenal gland.  BONES: Sclerosis and cortical thickening at the posteriorright eighth   rib, unchanged. Chronic fracture deformity of the left scapula. Mild   compression deformities of T3, T8, and T9, unchanged from 10/14/2016.    IMPRESSION:   1.  No pulmonary embolism.  2.  Bilateral upper lobe, lingular, and right middle lobe predominant   tree-in-bud opacities and consolidations, likely infectious in etiology.   Differential considerations include atypical infection such as JAMESON.  3.  Interval increase in size of partially imaged infrarenal abdominal   aortic aneurysm, which currently measures 5.2 cm, previously 4.6 cm. This   may be due to differences in technique. Consider dedicated abdominal   imaging if clinically necessary.      IMPRESSION: 83y Male PAST MEDICAL & SURGICAL HISTORY:  Diabetes mellitus  Osteoporosis  Osteoarthritis  HTN (hypertension)  Depression  Anemia  Seizure  Dementia  No significant past surgical history    Impression; 84 Y/O Male with prior mentioned multiple chronic conditions. Presented with chest pain and cough secondary to Pneumonia. CT chest showed infectious processes and an increase in size in infrarenal aneurysm . No PE. BLD Cx x 2 negative.    Suggestion;  Continue PRN Albuterol.  Reinforced compliance to daily medications.    Pulmonary follow up outpatient.          Agree with above assessment and plan as transcribed.

## 2018-10-15 NOTE — PROGRESS NOTE ADULT - SUBJECTIVE AND OBJECTIVE BOX
Subjective: No chest pain or sob   	    ALBUTerol    90 MICROgram(s) HFA Inhaler 2 Puff(s) Inhalation every 6 hours PRN  aspirin enteric coated 81 milliGRAM(s) Oral daily  azithromycin  IVPB 500 milliGRAM(s) IV Intermittent every 24 hours  cefTRIAXone   IVPB 1 Gram(s) IV Intermittent every 24 hours  cholecalciferol 1000 Unit(s) Oral daily  ferrous    sulfate 325 milliGRAM(s) Oral daily  folic acid 1 milliGRAM(s) Oral daily  insulin lispro (HumaLOG) corrective regimen sliding scale   SubCutaneous Before meals and at bedtime  levETIRAcetam 500 milliGRAM(s) Oral two times a day  losartan 25 milliGRAM(s) Oral daily  metoprolol succinate ER 25 milliGRAM(s) Oral daily  PARoxetine 10 milliGRAM(s) Oral daily  simvastatin 10 milliGRAM(s) Oral at bedtime                            9.9    5.5   )-----------( 215      ( 14 Oct 2018 07:49 )             30.9       10-14    139  |  105  |  21<H>  ----------------------------<  96  4.1   |  28  |  0.87    Ca    8.8      14 Oct 2018 07:49        CARDIAC MARKERS ( 12 Oct 2018 07:10 )  <0.015 ng/mL / x     / 170 U/L / x     / 1.5 ng/mL  CARDIAC MARKERS ( 11 Oct 2018 20:21 )  <0.015 ng/mL / x     / 187 U/L / x     / 1.4 ng/mL  CARDIAC MARKERS ( 11 Oct 2018 12:34 )  <0.015 ng/mL / x     / x     / x     / x            T(C): 36.8 (10-14-18 @ 07:13), Max: 37.3 (10-13-18 @ 15:14)  HR: 75 (10-14-18 @ 07:13) (75 - 82)  BP: 124/79 (10-14-18 @ 07:13) (113/65 - 135/71)  RR: 18 (10-14-18 @ 07:13) (18 - 18)  SpO2: 95% (10-14-18 @ 07:13) (93% - 96%)  Wt(kg): --    I&O's Summary        Heart: normal S1, S2, RRR, no m/r/g  Lungs: cta b/l  Abd: soft nT  Ext: no edema     TELEMETRY: no tele    ECG:  	  RADIOLOGY:   DIAGNOSTIC TESTING:  [ ] Echocardiogram: < from: Transthoracic Echocardiogram (10.12.18 @ 07:45) >  CONCLUSIONS:  1. Normal mitral valve. Trace mitral regurgitation.  2. Normal trileaflet aortic valve. No aortic stenosis.  Trace aortic regurgitation.  3. Normal aortic root.  4. Normal left atrium.  5. Normal left ventricular internal dimensions and wall  thicknesses.  6. Hyperdynamic left ventricular systolic function (EF  >70%).  7. Grade I diastolic dysfunction (Impaired relaxation).  8. Right ventricle not well visualized. Normal RV systolic  function (TAPSE 1.7 cm).  9. Normal tricuspid valve. Trace tricuspid regurgitation.  10. Pulmonic valve not well seen. Trace pulmonic  insufficiency is noted.  11. No pericardial effusion.    < end of copied text >    [ ]  Catheterization:  [ ] Stress Test:    OTHER: 	      ASSESSMENT/PLAN: 83y Male Dementia, COPD, HTN, PVD, T2DM, and Urinary Incontinence presents from [facility] for further evaluation of chest pain.    -TTE with normal LV function  -not a good candidate for invasive ischemic eval given advanced dementia   -continue with BB and statin  -follow up vascular     Guero Ordonez MD, FACC

## 2018-10-15 NOTE — DISCHARGE NOTE ADULT - PATIENT PORTAL LINK FT
You can access the [facility] Patient Portal, offered by [facility], by registering with the following website: [email or web address]

## 2018-10-15 NOTE — DISCHARGE NOTE ADULT - MEDICATION SUMMARY - MEDICATIONS TO TAKE
I will START or STAY ON the medications listed below when I get home from the hospital:    aspirin 81 mg oral tablet  -- 1 tab(s) by mouth once a day  -- Indication: For Prophylactic measure    Cozaar 25 mg oral tablet  -- 1 tab(s) by mouth once a day  -- Indication: For HTN (hypertension)    Keppra 500 mg oral tablet  -- 1 tab(s) by mouth 2 times a day  -- Indication: For Seizure    Paxil 10 mg oral tablet  -- 1 tab(s) by mouth once a day (at bedtime)  -- Indication: For Depression    simvastatin 10 mg oral tablet  -- 1 tab(s) by mouth once a day (at bedtime)  -- Indication: For Prophylactic measure    metoprolol succinate 25 mg oral tablet, extended release  -- 1 tab(s) by mouth once a day  -- Indication: For HTN (hypertension)    Ventolin HFA 90 mcg/inh inhalation aerosol  -- 2 puff(s) inhaled 4 times a day  -- Indication: For Pneumonia    cefuroxime 250 mg oral tablet  -- 1 tab(s) by mouth 2 times a day   -- Finish all this medication unless otherwise directed by prescriber.  Medication should be taken with plenty of water.  Take with food or milk.    -- Indication: For Pneumonia    ferrous sulfate 325 mg (65 mg elemental iron) oral delayed release tablet  -- 1 tab(s) by mouth once a day  -- Indication: For Anemia    folic acid 1 mg oral tablet  -- 1 tab(s) by mouth once a day  -- Indication: For Anemia    Vitamin D3 1000 intl units oral tablet  -- 1 tab(s) by mouth once a day  -- Indication: For Vitamin D deficiency I will START or STAY ON the medications listed below when I get home from the hospital:    aspirin 81 mg oral tablet  -- 1 tab(s) by mouth once a day  -- Indication: For Prophylactic measure    Cozaar 25 mg oral tablet  -- 1 tab(s) by mouth once a day  -- Indication: For HTN (hypertension)    Keppra 500 mg oral tablet  -- 1 tab(s) by mouth 2 times a day  -- Indication: For Seizure    Paxil 10 mg oral tablet  -- 1 tab(s) by mouth once a day (at bedtime)  -- Indication: For Depression    simvastatin 10 mg oral tablet  -- 1 tab(s) by mouth once a day (at bedtime)  -- Indication: For Prophylactic measure    Ventolin HFA 90 mcg/inh inhalation aerosol  -- 2 puff(s) inhaled 4 times a day  -- Indication: For Pneumonia    cefuroxime 250 mg oral tablet  -- 1 tab(s) by mouth 2 times a day   -- Finish all this medication unless otherwise directed by prescriber.  Medication should be taken with plenty of water.  Take with food or milk.    -- Indication: For Pneumonia    ferrous sulfate 325 mg (65 mg elemental iron) oral delayed release tablet  -- 1 tab(s) by mouth once a day  -- Indication: For Anemia    folic acid 1 mg oral tablet  -- 1 tab(s) by mouth once a day  -- Indication: For Anemia    Vitamin D3 1000 intl units oral tablet  -- 1 tab(s) by mouth once a day  -- Indication: For Vitamin D deficiency

## 2018-10-15 NOTE — PHYSICAL THERAPY INITIAL EVALUATION ADULT - GENERAL OBSERVATIONS, REHAB EVAL
Consult received,EMR, radiology and labs reviewed. Patient received supine in bed, NAD, forgetful ,Patient agreed to EVALUATION from Physical Therapist.

## 2018-10-17 ENCOUNTER — INPATIENT (INPATIENT)
Facility: HOSPITAL | Age: 83
LOS: 4 days | Discharge: NURSING, REHAB OR LONG-TERM CARE | DRG: 640 | End: 2018-10-22
Attending: INTERNAL MEDICINE | Admitting: INTERNAL MEDICINE
Payer: MEDICARE

## 2018-10-17 VITALS
DIASTOLIC BLOOD PRESSURE: 60 MMHG | HEIGHT: 65 IN | RESPIRATION RATE: 16 BRPM | SYSTOLIC BLOOD PRESSURE: 96 MMHG | OXYGEN SATURATION: 95 % | WEIGHT: 130.07 LBS | TEMPERATURE: 98 F | HEART RATE: 88 BPM

## 2018-10-17 DIAGNOSIS — J40 BRONCHITIS, NOT SPECIFIED AS ACUTE OR CHRONIC: ICD-10-CM

## 2018-10-17 DIAGNOSIS — R62.7 ADULT FAILURE TO THRIVE: ICD-10-CM

## 2018-10-17 DIAGNOSIS — F32.9 MAJOR DEPRESSIVE DISORDER, SINGLE EPISODE, UNSPECIFIED: ICD-10-CM

## 2018-10-17 DIAGNOSIS — G40.909 EPILEPSY, UNSPECIFIED, NOT INTRACTABLE, WITHOUT STATUS EPILEPTICUS: ICD-10-CM

## 2018-10-17 DIAGNOSIS — D64.9 ANEMIA, UNSPECIFIED: ICD-10-CM

## 2018-10-17 DIAGNOSIS — N17.9 ACUTE KIDNEY FAILURE, UNSPECIFIED: ICD-10-CM

## 2018-10-17 DIAGNOSIS — Z29.9 ENCOUNTER FOR PROPHYLACTIC MEASURES, UNSPECIFIED: ICD-10-CM

## 2018-10-17 DIAGNOSIS — I10 ESSENTIAL (PRIMARY) HYPERTENSION: ICD-10-CM

## 2018-10-17 LAB
ALBUMIN SERPL ELPH-MCNC: 3 G/DL — LOW (ref 3.5–5)
ALP SERPL-CCNC: 51 U/L — SIGNIFICANT CHANGE UP (ref 40–120)
ALT FLD-CCNC: 23 U/L DA — SIGNIFICANT CHANGE UP (ref 10–60)
ANION GAP SERPL CALC-SCNC: 7 MMOL/L — SIGNIFICANT CHANGE UP (ref 5–17)
APTT BLD: 28.7 SEC — SIGNIFICANT CHANGE UP (ref 27.5–37.4)
AST SERPL-CCNC: 32 U/L — SIGNIFICANT CHANGE UP (ref 10–40)
BILIRUB SERPL-MCNC: 0.4 MG/DL — SIGNIFICANT CHANGE UP (ref 0.2–1.2)
BUN SERPL-MCNC: 30 MG/DL — HIGH (ref 7–18)
CALCIUM SERPL-MCNC: 9.4 MG/DL — SIGNIFICANT CHANGE UP (ref 8.4–10.5)
CHLORIDE SERPL-SCNC: 107 MMOL/L — SIGNIFICANT CHANGE UP (ref 96–108)
CO2 SERPL-SCNC: 28 MMOL/L — SIGNIFICANT CHANGE UP (ref 22–31)
CREAT SERPL-MCNC: 1.11 MG/DL — SIGNIFICANT CHANGE UP (ref 0.5–1.3)
CULTURE RESULTS: SIGNIFICANT CHANGE UP
CULTURE RESULTS: SIGNIFICANT CHANGE UP
EOSINOPHIL NFR BLD AUTO: 2 % — SIGNIFICANT CHANGE UP (ref 0–6)
GLUCOSE SERPL-MCNC: 121 MG/DL — HIGH (ref 70–99)
HCT VFR BLD CALC: 32.8 % — LOW (ref 39–50)
HGB BLD-MCNC: 10.4 G/DL — LOW (ref 13–17)
INR BLD: 1.31 RATIO — HIGH (ref 0.88–1.16)
LACTATE SERPL-SCNC: 0.7 MMOL/L — SIGNIFICANT CHANGE UP (ref 0.7–2)
LYMPHOCYTES # BLD AUTO: 34 % — SIGNIFICANT CHANGE UP (ref 13–44)
MCHC RBC-ENTMCNC: 31.7 GM/DL — LOW (ref 32–36)
MCHC RBC-ENTMCNC: 32.6 PG — SIGNIFICANT CHANGE UP (ref 27–34)
MCV RBC AUTO: 102.9 FL — HIGH (ref 80–100)
MONOCYTES NFR BLD AUTO: 27 % — HIGH (ref 2–14)
NEUTROPHILS NFR BLD AUTO: 35 % — LOW (ref 43–77)
PLATELET # BLD AUTO: 219 K/UL — SIGNIFICANT CHANGE UP (ref 150–400)
POTASSIUM SERPL-MCNC: 5 MMOL/L — SIGNIFICANT CHANGE UP (ref 3.5–5.3)
POTASSIUM SERPL-SCNC: 5 MMOL/L — SIGNIFICANT CHANGE UP (ref 3.5–5.3)
PROT SERPL-MCNC: 8 G/DL — SIGNIFICANT CHANGE UP (ref 6–8.3)
PROTHROM AB SERPL-ACNC: 14.4 SEC — HIGH (ref 9.8–12.7)
RAPID RVP RESULT: DETECTED
RBC # BLD: 3.18 M/UL — LOW (ref 4.2–5.8)
RBC # FLD: 12.6 % — SIGNIFICANT CHANGE UP (ref 10.3–14.5)
RV+EV RNA SPEC QL NAA+PROBE: DETECTED
SODIUM SERPL-SCNC: 142 MMOL/L — SIGNIFICANT CHANGE UP (ref 135–145)
SPECIMEN SOURCE: SIGNIFICANT CHANGE UP
SPECIMEN SOURCE: SIGNIFICANT CHANGE UP
WBC # BLD: 5.4 K/UL — SIGNIFICANT CHANGE UP (ref 3.8–10.5)
WBC # FLD AUTO: 5.4 K/UL — SIGNIFICANT CHANGE UP (ref 3.8–10.5)

## 2018-10-17 PROCEDURE — 99285 EMERGENCY DEPT VISIT HI MDM: CPT

## 2018-10-17 PROCEDURE — 71045 X-RAY EXAM CHEST 1 VIEW: CPT | Mod: 26

## 2018-10-17 RX ORDER — ALBUTEROL 90 UG/1
2 AEROSOL, METERED ORAL EVERY 6 HOURS
Qty: 0 | Refills: 0 | Status: DISCONTINUED | OUTPATIENT
Start: 2018-10-17 | End: 2018-10-22

## 2018-10-17 RX ORDER — FERROUS SULFATE 325(65) MG
325 TABLET ORAL DAILY
Qty: 0 | Refills: 0 | Status: DISCONTINUED | OUTPATIENT
Start: 2018-10-17 | End: 2018-10-22

## 2018-10-17 RX ORDER — ASPIRIN/CALCIUM CARB/MAGNESIUM 324 MG
81 TABLET ORAL DAILY
Qty: 0 | Refills: 0 | Status: DISCONTINUED | OUTPATIENT
Start: 2018-10-17 | End: 2018-10-22

## 2018-10-17 RX ORDER — FOLIC ACID 0.8 MG
1 TABLET ORAL DAILY
Qty: 0 | Refills: 0 | Status: DISCONTINUED | OUTPATIENT
Start: 2018-10-17 | End: 2018-10-22

## 2018-10-17 RX ORDER — SIMVASTATIN 20 MG/1
10 TABLET, FILM COATED ORAL AT BEDTIME
Qty: 0 | Refills: 0 | Status: DISCONTINUED | OUTPATIENT
Start: 2018-10-17 | End: 2018-10-22

## 2018-10-17 RX ORDER — SODIUM CHLORIDE 9 MG/ML
1000 INJECTION INTRAMUSCULAR; INTRAVENOUS; SUBCUTANEOUS
Qty: 0 | Refills: 0 | Status: DISCONTINUED | OUTPATIENT
Start: 2018-10-17 | End: 2018-10-18

## 2018-10-17 RX ORDER — LEVETIRACETAM 250 MG/1
500 TABLET, FILM COATED ORAL
Qty: 0 | Refills: 0 | Status: DISCONTINUED | OUTPATIENT
Start: 2018-10-17 | End: 2018-10-22

## 2018-10-17 RX ORDER — CHOLECALCIFEROL (VITAMIN D3) 125 MCG
1000 CAPSULE ORAL DAILY
Qty: 0 | Refills: 0 | Status: DISCONTINUED | OUTPATIENT
Start: 2018-10-17 | End: 2018-10-22

## 2018-10-17 RX ORDER — LOSARTAN POTASSIUM 100 MG/1
25 TABLET, FILM COATED ORAL DAILY
Qty: 0 | Refills: 0 | Status: DISCONTINUED | OUTPATIENT
Start: 2018-10-17 | End: 2018-10-22

## 2018-10-17 RX ORDER — CEFUROXIME AXETIL 250 MG
250 TABLET ORAL EVERY 12 HOURS
Qty: 0 | Refills: 0 | Status: COMPLETED | OUTPATIENT
Start: 2018-10-17 | End: 2018-10-19

## 2018-10-17 RX ORDER — HEPARIN SODIUM 5000 [USP'U]/ML
5000 INJECTION INTRAVENOUS; SUBCUTANEOUS EVERY 12 HOURS
Qty: 0 | Refills: 0 | Status: DISCONTINUED | OUTPATIENT
Start: 2018-10-17 | End: 2018-10-22

## 2018-10-17 RX ADMIN — SODIUM CHLORIDE 60 MILLILITER(S): 9 INJECTION INTRAMUSCULAR; INTRAVENOUS; SUBCUTANEOUS at 18:59

## 2018-10-17 RX ADMIN — SIMVASTATIN 10 MILLIGRAM(S): 20 TABLET, FILM COATED ORAL at 23:21

## 2018-10-17 NOTE — ED ADULT NURSE NOTE - OBJECTIVE STATEMENT
pt is here for hypotension.  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

## 2018-10-17 NOTE — H&P ADULT - PROBLEM SELECTOR PLAN 1
-Patient is elderly male, with c/o decreased poor intake.  -Will encourage oral intake and start IV fluid  -PT consult -Patient is elderly male, with c/o decreased poor intake.  -Will encourage oral intake and start IV fluid  -Patient is elderly male and is unable to take care of himself. Will get PT consult. -Patient is elderly male, with c/o decreased poor intake.  -Will encourage oral intake and start IV fluid  -Patient is elderly male and is unable to take care of himself. Will get PT consult/social consult. -Patient is elderly male, with c/o decreased poor intake.  -Will encourage oral intake and start IV fluid  -F/u nutrition consult.   -Patient is elderly male and is unable to take care of himself. Will get PT consult.

## 2018-10-17 NOTE — ED PROVIDER NOTE - MEDICAL DECISION MAKING DETAILS
Patient sent form nursing home for low blood pressure, labs and reassess Patient sent form nursing home for low blood pressure, labs and reassess  labs reveal worsening renal function. admit for IV hydration, possibly rehab/nursing home placement. patient not strong enough to be in his assisted living facility

## 2018-10-17 NOTE — ED PROVIDER NOTE - OBJECTIVE STATEMENT
84 y/o M patient with a significant PMHx of Anemia, Dementia, Depression, DM, HTN, Osteoarthritis, Osteoporosis, Seizure and a significant PSHx BIB EMS from [facility] to the ED for low blood pressure, loss of appetite and coughing. Patient was recently d/c from the hospital x2 days ago after being treated for PNA. Patient unable to give HPI due to dementia.

## 2018-10-17 NOTE — ED ADULT NURSE NOTE - NSIMPLEMENTINTERV_GEN_ALL_ED
Implemented All Fall Risk Interventions:  [place] to call system. Call bell, personal items and telephone within reach. Instruct patient to call for assistance. Room bathroom lighting operational. Non-slip footwear when patient is off stretcher. Physically safe environment: no spills, clutter or unnecessary equipment. Stretcher in lowest position, wheels locked, appropriate side rails in place. Provide visual cue, wrist band, yellow gown, etc. Monitor gait and stability. Monitor for mental status changes and reorient to person, place, and time. Review medications for side effects contributing to fall risk. Reinforce activity limits and safety measures with patient and family.

## 2018-10-17 NOTE — H&P ADULT - NSHPATTENDINGPLANDISCUSS_GEN_ALL_CORE
PGY 1. elderly male from facility with FTT and cough. Social work/PT/nutrition consult and RVP as concern for bronchitis.

## 2018-10-17 NOTE — H&P ADULT - ASSESSMENT
83 year old  male w/ PMH of Dementia, COPD, HTN, PVD, T2DM, and Urinary Incontinence presents from [facility] . [facility] and was being treated for pneumonia. Patient is a poor historian, [place] but usually knows where, unable to provider further HPI. He denies any complaints and this point. He is sent from assisted living facility due to poor oral intake.    Patient is admitted to medicine  for failure to thrive. 83 year old  male w/ PMH of Dementia, COPD, HTN, PVD, T2DM, and Urinary Incontinence presents from [facility] . Patient was discharged from [facility] recently 2 days ago and was being treated for pneumonia. Patient is a poor historian, [place] but usually knows where, unable to provider further HPI. He denies any complaints and this point. He is sent from assisted living facility due to poor oral intake.    Patient is admitted to medicine  for failure to thrive and decline in functional status.

## 2018-10-17 NOTE — ED ADULT NURSE NOTE - NSFALLRSKPASTHIST_ED_ALL_ED
Dr Usman Interiano at the bedside for patient evaluation at this time     Jorge Reeder, RN  05/13/18 2002 unable to determine

## 2018-10-17 NOTE — H&P ADULT - PROBLEM SELECTOR PLAN 8
IMPROVE VTE score:  [ ] Previous VTE                                                3  [ ] Thrombophilia                                             2  [ ] Lower limb paralysis                                  2        (unable to hold up >15 seconds)    [ ] Current Cancer (within 6 months)            2   [x] Immobilization > 24 hrs                              1  [ ] ICU/CCU stay > 24 hours                            1  [x] Age > 60                                                         1  IMPROVE Score 2   Heparin for DVT prophylaxis

## 2018-10-17 NOTE — H&P ADULT - PROBLEM SELECTOR PLAN 7
IMPROVE VTE score:  [ ] Previous VTE                                                3  [ ] Thrombophilia                                             2  [ ] Lower limb paralysis                                  2        (unable to hold up >15 seconds)    [ ] Current Cancer (within 6 months)            2   [x] Immobilization > 24 hrs                              1  [ ] ICU/CCU stay > 24 hours                            1  [x] Age > 60                                                         1  IMPROVE Score 2   Heparin for DVT prophylaxis Patient's blood pressure is noted to be towards lower side.  -Will hold losartan for now, may restart if needed.

## 2018-10-17 NOTE — ED ADULT NURSE NOTE - NS ED NURSE LEVEL OF CONSCIOUSNESS MENTAL STATUS
Contacted the pt to inform her that her TSH lab is WNL and that her CBC shows slight anemia. Pt verbalized understanding. Letter sent out.  
Awake

## 2018-10-17 NOTE — H&P ADULT - PROBLEM SELECTOR PLAN 6
IMPROVE VTE score:  [ ] Previous VTE                                                3  [ ] Thrombophilia                                             2  [ ] Lower limb paralysis                                  2        (unable to hold up >15 seconds)    [ ] Current Cancer (within 6 months)            2   [x] Immobilization > 24 hrs                              1  [ ] ICU/CCU stay > 24 hours                            1  [x] Age > 60                                                         1  IMPROVE Score 2   Heparin for DVT prophylaxis Patient has history of depression, c/w paroxetine.

## 2018-10-17 NOTE — H&P ADULT - NSHPPHYSICALEXAM_GEN_ALL_CORE
CONSTITUTIONAL: Well appearing, well nourished, awake, alert and in no apparent distress  ENMT: Airway patent, Nasal mucosa clear. Mouth with normal mucosa. Throat has no vesicles, no oropharyngeal exudates and uvula is midline.  EYES: Clear bilaterally, pupils equal, round and reactive to light. EOMI.  CARDIAC: Normal rate, regular rhythm.  Heart sounds S1, S2.  No murmurs, rubs or gallops   RESPIRATORY: Breath sounds clear and equal bilaterally. No wheezes, rhales or rhonchi  MUSCULOSKELETAL: Spine appears normal, range of motion is not limited, no muscle or joint tenderness  EXTREMITIES: No edema, cyanosis or deformity   NEUROLOGICAL: Alert and oriented, no focal deficits, no motor or sensory deficits.  SKIN: No rash, skin turgor CONSTITUTIONAL: NAD, awake alert  ENMT: Airway patent, Nasal mucosa clear. Mouth with normal mucosa. Throat has no vesicles, no oropharyngeal exudates and uvula is midline.  EYES: Clear bilaterally, pupils equal, round and reactive to light. EOMI.  CARDIAC: Normal rate, regular rhythm.  Heart sounds S1, S2.  No murmurs, rubs or gallops   RESPIRATORY: Breath sounds clear and equal bilaterally. No wheezes, rhales or rhonchi  MUSCULOSKELETAL: Spine appears normal, range of motion is not limited, no muscle or joint tenderness  EXTREMITIES: No edema, cyanosis or deformity   NEUROLOGICAL: Alert and oriented, no focal deficits, no motor or sensory deficits.  SKIN: No rash, skin turgor

## 2018-10-17 NOTE — H&P ADULT - HISTORY OF PRESENT ILLNESS
83 year old  male w/ PMH of Dementia, COPD, HTN, PVD, T2DM, and Urinary Incontinence presents from [facility] . Patient was discharged from [facility] recently 2 days ago,  patient poor historian, [place] (but usually knows where, unable to provider further HPI. In the ED patient seen in NAD coughing nonproductive at bedside - ED w/u included CTA negative for PE but noted b/l upper lobes, lingula and RML opacities concerning for infectious cause and XR of the LUE negative for acute fracture. Patient received dose of Azithromycin and Ceftriaxone along w/ 162 mg of ASA. Spoke w/ HCP Akiko Moon - updated about patient's status; DNR but okay with intubation. Family denied Hx of TB infection and came from [facility] years ago -  phone number . 83 year old  male w/ PMH of Dementia, COPD, HTN, PVD, T2DM, and Urinary Incontinence presents from [facility] . Patient was discharged from [facility] recently 2 days ago and was being treated for pneumonia. Patient is a poor historian, AACarey but usually knows where, unable to provider further HPI. He denies any complaints and this point. He is sent from assisted living facility due to poor oral intake. 83 year old  male w/ PMH of Dementia, COPD, HTN, PVD, T2DM, and Urinary Incontinence presents from [facility] for poor appetite . Patient was discharged from [facility] recently 2 days ago and was being treated for pneumonia. Patient is a poor historian, AACarey but usually knows where, unable to provider further HPI. He denies any complaints and this point. He is sent from assisted living facility due to poor oral intake. 83 year old  male w/ PMH of Dementia, COPD, HTN, PVD, T2DM, and Urinary Incontinence presents from his assisted living facility for poor appetite and occasional cough.  Patient was discharged from [facility] recently 2 days ago and was being treated for pneumonia, he was given cefuroxime 250mg on discharge, needs 2 more days to complete antibiotic course. . Patient is a poor historian, AAOx1, unable to provider further HPI. He denies any other complaints and this point. He is sent from assisted living facility due to poor oral intake and decline in his functional status.

## 2018-10-17 NOTE — H&P ADULT - PROBLEM SELECTOR PLAN 2
Patient is also c/o cough, chest X-ray was done, no acute disease   -F/u RVP panel  -F/u blood cx  -C/w steroids and taper slowly  - Patient is also c/o cough, chest X-ray was done, no acute disease   -F/u RVP panel  -F/u blood cx  -C/w steroids and taper slowly  - Will continue cefuroxime for 2 days as patient has 2 more days remaining to complete his antibiotic course for Pneumonia. Patient is also c/o cough, chest X-ray was done, no acute disease   -F/u RVP panel  -F/u blood cx  -Will continue with tapering dose of steroids.   - Will continue cefuroxime for 2 days as patient has 2 more days remaining to complete his antibiotic course for Pneumonia.

## 2018-10-18 ENCOUNTER — TRANSCRIPTION ENCOUNTER (OUTPATIENT)
Age: 83
End: 2018-10-18

## 2018-10-18 DIAGNOSIS — R09.89 OTHER SPECIFIED SYMPTOMS AND SIGNS INVOLVING THE CIRCULATORY AND RESPIRATORY SYSTEMS: ICD-10-CM

## 2018-10-18 LAB
ANION GAP SERPL CALC-SCNC: 7 MMOL/L — SIGNIFICANT CHANGE UP (ref 5–17)
BUN SERPL-MCNC: 23 MG/DL — HIGH (ref 7–18)
CALCIUM SERPL-MCNC: 8.9 MG/DL — SIGNIFICANT CHANGE UP (ref 8.4–10.5)
CHLORIDE SERPL-SCNC: 108 MMOL/L — SIGNIFICANT CHANGE UP (ref 96–108)
CO2 SERPL-SCNC: 28 MMOL/L — SIGNIFICANT CHANGE UP (ref 22–31)
CREAT SERPL-MCNC: 0.87 MG/DL — SIGNIFICANT CHANGE UP (ref 0.5–1.3)
GLUCOSE BLDC GLUCOMTR-MCNC: 108 MG/DL — HIGH (ref 70–99)
GLUCOSE BLDC GLUCOMTR-MCNC: 151 MG/DL — HIGH (ref 70–99)
GLUCOSE BLDC GLUCOMTR-MCNC: 165 MG/DL — HIGH (ref 70–99)
GLUCOSE BLDC GLUCOMTR-MCNC: 167 MG/DL — HIGH (ref 70–99)
GLUCOSE SERPL-MCNC: 101 MG/DL — HIGH (ref 70–99)
HCT VFR BLD CALC: 30.3 % — LOW (ref 39–50)
HGB BLD-MCNC: 9.7 G/DL — LOW (ref 13–17)
MAGNESIUM SERPL-MCNC: 2.4 MG/DL — SIGNIFICANT CHANGE UP (ref 1.6–2.6)
MCHC RBC-ENTMCNC: 31.8 GM/DL — LOW (ref 32–36)
MCHC RBC-ENTMCNC: 33.2 PG — SIGNIFICANT CHANGE UP (ref 27–34)
MCV RBC AUTO: 104.3 FL — HIGH (ref 80–100)
PHOSPHATE SERPL-MCNC: 2.7 MG/DL — SIGNIFICANT CHANGE UP (ref 2.5–4.5)
PLATELET # BLD AUTO: 202 K/UL — SIGNIFICANT CHANGE UP (ref 150–400)
POTASSIUM SERPL-MCNC: 4.3 MMOL/L — SIGNIFICANT CHANGE UP (ref 3.5–5.3)
POTASSIUM SERPL-SCNC: 4.3 MMOL/L — SIGNIFICANT CHANGE UP (ref 3.5–5.3)
RBC # BLD: 2.91 M/UL — LOW (ref 4.2–5.8)
RBC # FLD: 12.9 % — SIGNIFICANT CHANGE UP (ref 10.3–14.5)
SODIUM SERPL-SCNC: 143 MMOL/L — SIGNIFICANT CHANGE UP (ref 135–145)
WBC # BLD: 5.3 K/UL — SIGNIFICANT CHANGE UP (ref 3.8–10.5)
WBC # FLD AUTO: 5.3 K/UL — SIGNIFICANT CHANGE UP (ref 3.8–10.5)

## 2018-10-18 RX ORDER — CEFUROXIME AXETIL 250 MG
1 TABLET ORAL
Qty: 0 | Refills: 0 | COMMUNITY
Start: 2018-10-18 | End: 2018-10-19

## 2018-10-18 RX ADMIN — Medication 325 MILLIGRAM(S): at 12:14

## 2018-10-18 RX ADMIN — Medication 250 MILLIGRAM(S): at 17:26

## 2018-10-18 RX ADMIN — SIMVASTATIN 10 MILLIGRAM(S): 20 TABLET, FILM COATED ORAL at 21:09

## 2018-10-18 RX ADMIN — Medication 81 MILLIGRAM(S): at 12:14

## 2018-10-18 RX ADMIN — SODIUM CHLORIDE 60 MILLILITER(S): 9 INJECTION INTRAMUSCULAR; INTRAVENOUS; SUBCUTANEOUS at 06:09

## 2018-10-18 RX ADMIN — Medication 250 MILLIGRAM(S): at 06:09

## 2018-10-18 RX ADMIN — Medication 1000 UNIT(S): at 14:35

## 2018-10-18 RX ADMIN — Medication 30 MILLIGRAM(S): at 06:09

## 2018-10-18 RX ADMIN — SODIUM CHLORIDE 60 MILLILITER(S): 9 INJECTION INTRAMUSCULAR; INTRAVENOUS; SUBCUTANEOUS at 01:17

## 2018-10-18 RX ADMIN — LEVETIRACETAM 500 MILLIGRAM(S): 250 TABLET, FILM COATED ORAL at 17:26

## 2018-10-18 RX ADMIN — HEPARIN SODIUM 5000 UNIT(S): 5000 INJECTION INTRAVENOUS; SUBCUTANEOUS at 06:10

## 2018-10-18 RX ADMIN — LOSARTAN POTASSIUM 25 MILLIGRAM(S): 100 TABLET, FILM COATED ORAL at 06:09

## 2018-10-18 RX ADMIN — LEVETIRACETAM 500 MILLIGRAM(S): 250 TABLET, FILM COATED ORAL at 06:09

## 2018-10-18 RX ADMIN — Medication 10 MILLIGRAM(S): at 12:14

## 2018-10-18 RX ADMIN — HEPARIN SODIUM 5000 UNIT(S): 5000 INJECTION INTRAVENOUS; SUBCUTANEOUS at 17:26

## 2018-10-18 RX ADMIN — Medication 1 MILLIGRAM(S): at 12:14

## 2018-10-18 NOTE — DISCHARGE NOTE ADULT - PLAN OF CARE
Adequate nutritional intake Continue well balanced diet  Improved during hospital course Asymptomatic Remain stable and symptoms free Safety Fall precaution  Decisions per HCP A1C No intervention required Stable Continue Iron supplementation Resolved Continue fluid hydration Seizure precaution  Fall precaution Continue well balanced diet  Improved during hospital course, Remain stable and symptom free A1C 6.0

## 2018-10-18 NOTE — DISCHARGE NOTE ADULT - NS AS ACTIVITY OBS
Walking-Indoors allowed/Stairs allowed/Bathing allowed/Showering allowed Stairs allowed/Do not drive or operate machinery/Bathing allowed/Showering allowed/Walking-Indoors allowed

## 2018-10-18 NOTE — PROGRESS NOTE ADULT - SUBJECTIVE AND OBJECTIVE BOX
NP Note discussed with  Primary Attending    Patient is a 83y old  Male who presents with a chief complaint of failure to thrive (17 Oct 2018 17:57)      INTERVAL HPI/OVERNIGHT EVENTS: no new complaints    MEDICATIONS  (STANDING):  aspirin  chewable 81 milliGRAM(s) Oral daily  cefuroxime   Tablet 250 milliGRAM(s) Oral every 12 hours  cholecalciferol 1000 Unit(s) Oral daily  ferrous    sulfate 325 milliGRAM(s) Oral daily  folic acid 1 milliGRAM(s) Oral daily  heparin  Injectable 5000 Unit(s) SubCutaneous every 12 hours  levETIRAcetam 500 milliGRAM(s) Oral two times a day  losartan 25 milliGRAM(s) Oral daily  PARoxetine 10 milliGRAM(s) Oral daily  predniSONE   Tablet   Oral   predniSONE   Tablet 30 milliGRAM(s) Oral daily  simvastatin 10 milliGRAM(s) Oral at bedtime  sodium chloride 0.9%. 1000 milliLiter(s) (60 mL/Hr) IV Continuous <Continuous>    MEDICATIONS  (PRN):  ALBUTerol    90 MICROgram(s) HFA Inhaler 2 Puff(s) Inhalation every 6 hours PRN Shortness of Breath      __________________________________________________  REVIEW OF SYSTEMS:    CONSTITUTIONAL: No fever,   EYES: no acute visual disturbances  NECK: No pain or stiffness  RESPIRATORY: No cough; No shortness of breath  CARDIOVASCULAR: No chest pain, no palpitations  GASTROINTESTINAL: No pain. No nausea or vomiting; No diarrhea   NEUROLOGICAL: No headache or numbness, no tremors  MUSCULOSKELETAL: No joint pain, no muscle pain  GENITOURINARY: no dysuria, no frequency, no hesitancy  PSYCHIATRY: no depression , no anxiety  ALL OTHER  ROS negative        Vital Signs Last 24 Hrs  T(C): 36.8 (18 Oct 2018 06:42), Max: 37.2 (17 Oct 2018 15:31)  T(F): 98.2 (18 Oct 2018 06:42), Max: 99 (17 Oct 2018 15:31)  HR: 81 (18 Oct 2018 06:42) (76 - 94)  BP: 129/65 (18 Oct 2018 06:42) (96/60 - 137/74)  BP(mean): --  RR: 16 (18 Oct 2018 06:42) (16 - 18)  SpO2: 97% (18 Oct 2018 06:42) (95% - 98%)    ________________________________________________  PHYSICAL EXAM:  GENERAL: NAD  HEENT: Normocephalic;  conjunctivae and sclerae clear; moist mucous membranes;   NECK : supple  CHEST/LUNG: Clear to auscultation bilaterally with good air entry   HEART: S1 S2  regular; no murmurs, gallops or rubs  ABDOMEN: Soft, Nontender, Nondistended; Bowel sounds present  EXTREMITIES: no cyanosis; no edema; no calf tenderness  SKIN: warm and dry; no rash  NERVOUS SYSTEM:  Awake and alert; Oriented  to place, person and time ; no new deficits    _________________________________________________  LABS:                        9.7    5.3   )-----------( 202      ( 18 Oct 2018 05:54 )             30.3     10-18    143  |  108  |  23<H>  ----------------------------<  101<H>  4.3   |  28  |  0.87    Ca    8.9      18 Oct 2018 05:54  Phos  2.7     10-18  Mg     2.4     10-18    TPro  8.0  /  Alb  3.0<L>  /  TBili  0.4  /  DBili  x   /  AST  32  /  ALT  23  /  AlkPhos  51  10-17    PT/INR - ( 17 Oct 2018 14:59 )   PT: 14.4 sec;   INR: 1.31 ratio         PTT - ( 17 Oct 2018 14:59 )  PTT:28.7 sec    CAPILLARY BLOOD GLUCOSE      POCT Blood Glucose.: 108 mg/dL (18 Oct 2018 08:15)        RADIOLOGY & ADDITIONAL TESTS:    Imaging Personally Reviewed:  YES  < from: Xray Chest 1 View- PORTABLE-Urgent (10.17.18 @ 17:05) >  The cardiac silhouette is within normal limits. The lungs are clear. No   pleural abnormality.    IMPRESSION: No acute disease.      < end of copied text >      Consultant(s) Notes Reviewed:   YES    Care Discussed with Consultants :     Plan of care was discussed with patient and /or primary care giver; all questions and concerns were addressed and care was aligned with patient's wishes.

## 2018-10-18 NOTE — PHYSICAL THERAPY INITIAL EVALUATION ADULT - CRITERIA FOR SKILLED THERAPEUTIC INTERVENTIONS
impairments found/predicted duration of therapy intervention/anticipated discharge recommendation/therapy frequency/rehab potential

## 2018-10-18 NOTE — DISCHARGE NOTE ADULT - CARE PLAN
Principal Discharge DX:	Failure to thrive in adult  Goal:	Adequate nutritional intake  Assessment and plan of treatment:	Continue well balanced diet  Improved during hospital course  Secondary Diagnosis:	Rhinovirus infection  Goal:	Asymptomatic  Assessment and plan of treatment:	Remain stable and symptoms free  Secondary Diagnosis:	Dementia  Goal:	Safety  Assessment and plan of treatment:	Fall precaution  Decisions per HCP  Secondary Diagnosis:	Diabetes mellitus  Goal:	A1C  Assessment and plan of treatment:	No intervention required  Secondary Diagnosis:	Anemia  Goal:	Stable  Assessment and plan of treatment:	Continue Iron supplementation  Secondary Diagnosis:	Prerenal azotemia  Goal:	Resolved  Assessment and plan of treatment:	Continue fluid hydration  Secondary Diagnosis:	Seizure disorder  Goal:	Safety  Assessment and plan of treatment:	Seizure precaution  Fall precaution Principal Discharge DX:	Failure to thrive in adult  Goal:	Adequate nutritional intake  Assessment and plan of treatment:	Continue well balanced diet  Improved during hospital course,  Secondary Diagnosis:	Rhinovirus infection  Goal:	Asymptomatic  Assessment and plan of treatment:	Remain stable and symptom free  Secondary Diagnosis:	Dementia  Goal:	Safety  Assessment and plan of treatment:	Fall precaution  Decisions per HCP  Secondary Diagnosis:	Diabetes mellitus  Goal:	A1C 6.0  Assessment and plan of treatment:	No intervention required  Secondary Diagnosis:	Anemia  Goal:	Stable  Assessment and plan of treatment:	Continue Iron supplementation  Secondary Diagnosis:	Prerenal azotemia  Goal:	Resolved  Assessment and plan of treatment:	Continue fluid hydration  Secondary Diagnosis:	Seizure disorder  Goal:	Safety  Assessment and plan of treatment:	Seizure precaution  Fall precaution

## 2018-10-18 NOTE — DISCHARGE NOTE ADULT - HOSPITAL COURSE
83 year old from [facility] with PMH of dementia AAOx1, COPD, HTN, PVD, T2DM, and Urinary Incontinence presented with poor appetite and occasional cough.  Discharged from [facility] 2 days ago primary diagnosis pneumonia given cefuroxime 250mg on discharge, needs 2 more days to complete antibiotic course.   T98.3 HR 88 RR 16 BP 96/60 PO 95% RA. Albumin 3. CXR NAD    Failure to thrive in adult  Eating well  Nutrition consult.   Regular diet DASH TLC    Bronchitis  Chest X-ray no acute disease   RVP detected - treat symptoms  Continued tapering dose of steroids.   Continued Cefuroxime for 2 days as patient has 2 more days remaining to complete his antibiotic course for Pneumonia.     COPD  Prednisone taper  Continue bronchodilators - long and short-acting    ARCHANA (acute kidney injury).  Plan: -Patient has elevated creatinine likely secondary to dehydration.  urine lytes  IV NS 60ml/hr fluids.     History of anemia  CBC monitored  Continued home medication Ferrous sulfate.     History of Seizure disorder  Continue home medication Keppra.     Depression  Continue paroxetine    HTN (hypertension)  Continued Losartan 25 mg daily initially  Monitor for hypotension and adjust medication accordingly per PCP 83 year old from [facility] with PMH of dementia AAOx1, COPD, HTN, PVD, T2DM, and Urinary Incontinence presented with poor appetite and occasional cough.  Discharged from [facility] 2 days ago primary diagnosis pneumonia given 98.3 HR 88 RR 16 BP 96/60 PO 95% RA. Albumin 3. CXR NAD  Pt completed course of cefuroxime and will finish prednisone taper as outpt.    Failure to thrive in adult  Eating well in hospital  Nutrition consult.   Regular diet DASH TLC    Bronchitis  Chest X-ray no acute disease   RVP detected - treat symptoms  Continued tapering dose of steroids.   Continued Cefuroxime for 2 days as patient has 2 more days remaining to complete his antibiotic course for Pneumonia.     COPD  Prednisone taper  Continue bronchodilators - long and short-acting    ARCHANA (acute kidney injury).  Plan: -Patient has elevated creatinine likely secondary to dehydration.  urine lytes  IV NS 60ml/hr fluids.     History of anemia  CBC monitored  Continued home medication Ferrous sulfate.     History of Seizure disorder  Continue home medication Keppra.     Depression  Continue paroxetine    HTN (hypertension)  Continued Losartan 25 mg daily initially

## 2018-10-18 NOTE — DISCHARGE NOTE ADULT - MEDICATION SUMMARY - MEDICATIONS TO TAKE
I will START or STAY ON the medications listed below when I get home from the hospital:    predniSONE  -- 30 milligram(s) by mouth once a day until 10/20/18 then 20 mg daily for 3 days then 10 mg daily for 3 days then discontinue  -- Indication: For COPD    aspirin 81 mg oral tablet  -- 1 tab(s) by mouth once a day  -- Indication: For HTN (hypertension)    Cozaar 25 mg oral tablet  -- 1 tab(s) by mouth once a day  -- Indication: For HTN (hypertension)    Keppra 500 mg oral tablet  -- 1 tab(s) by mouth 2 times a day  -- Indication: For Seizure disorder    Paxil 10 mg oral tablet  -- 1 tab(s) by mouth once a day (at bedtime)  -- Indication: For Depression    simvastatin 10 mg oral tablet  -- 1 tab(s) by mouth once a day (at bedtime)  -- Indication: For HLD    Ventolin HFA 90 mcg/inh inhalation aerosol  -- 2 puff(s) inhaled 4 times a day  -- Indication: For COPD    cefuroxime 250 mg oral tablet  -- 1 tab(s) by mouth every 12 hours  -- Indication: For Bronchitis    ferrous sulfate 325 mg (65 mg elemental iron) oral delayed release tablet  -- 1 tab(s) by mouth once a day  -- Indication: For Anemia    folic acid 1 mg oral tablet  -- 1 tab(s) by mouth once a day  -- Indication: For Anemia    Vitamin D3 1000 intl units oral tablet  -- 1 tab(s) by mouth once a day  -- Indication: For Supplementation I will START or STAY ON the medications listed below when I get home from the hospital:    predniSONE  -- 30 milligram(s) by mouth once a day until 10/20/18 then 20 mg daily for 3 days then 10 mg daily for 3 days then discontinue  -- Indication: For copd    aspirin 81 mg oral tablet  -- 1 tab(s) by mouth once a day  -- Indication: For cad    Cozaar 25 mg oral tablet  -- 1 tab(s) by mouth once a day  -- Indication: For HTN (hypertension)    Keppra 500 mg oral tablet  -- 1 tab(s) by mouth 2 times a day  -- Indication: For Seizure disorder    Paxil 10 mg oral tablet  -- 1 tab(s) by mouth once a day (at bedtime)  -- Indication: For Anxiety    simvastatin 10 mg oral tablet  -- 1 tab(s) by mouth once a day (at bedtime)  -- Indication: For HLD    Ventolin HFA 90 mcg/inh inhalation aerosol  -- 2 puff(s) inhaled 4 times a day  -- Indication: For COPD    ferrous sulfate 325 mg (65 mg elemental iron) oral delayed release tablet  -- 1 tab(s) by mouth once a day  -- Indication: For Anemia    folic acid 1 mg oral tablet  -- 1 tab(s) by mouth once a day  -- Indication: For Anemia    Vitamin D3 1000 intl units oral tablet  -- 1 tab(s) by mouth once a day  -- Indication: For vitamin d deficiency

## 2018-10-18 NOTE — PROGRESS NOTE ADULT - ASSESSMENT
83 year old  male w/ PMH of Dementia, COPD, HTN, PVD, T2DM, and Urinary Incontinence presents from his assisted living facility for poor appetite and occasional cough.  Patient was discharged from [facility] recently 2 days ago and was being treated for pneumonia, he was given cefuroxime 250mg on discharge, needs 2 more days to complete antibiotic course. . Patient is a poor historian, AAOx1, unable to provider further HPI. He denies any other complaints and this point. He is sent from assisted living facility due to poor oral intake and decline in his functional status.

## 2018-10-18 NOTE — DISCHARGE NOTE ADULT - CARE PROVIDER_API CALL
Jorge Crooks (MD), Medicine  [address] [place] [address]  Phone: [phone]  Fax: [phone]

## 2018-10-18 NOTE — DISCHARGE NOTE ADULT - PATIENT PORTAL LINK FT
You can access the [facility] Patient Portal, offered by [facility], by registering with the following website: [email or web address]

## 2018-10-18 NOTE — DISCHARGE NOTE ADULT - MEDICATION SUMMARY - MEDICATIONS TO STOP TAKING
I will STOP taking the medications listed below when I get home from the hospital:  None I will STOP taking the medications listed below when I get home from the hospital:    cefuroxime 250 mg oral tablet  -- 1 tab(s) by mouth 2 times a day   -- Finish all this medication unless otherwise directed by prescriber.  Medication should be taken with plenty of water.  Take with food or milk.

## 2018-10-19 DIAGNOSIS — R79.89 OTHER SPECIFIED ABNORMAL FINDINGS OF BLOOD CHEMISTRY: ICD-10-CM

## 2018-10-19 DIAGNOSIS — F03.90 UNSPECIFIED DEMENTIA WITHOUT BEHAVIORAL DISTURBANCE: ICD-10-CM

## 2018-10-19 LAB
GLUCOSE BLDC GLUCOMTR-MCNC: 118 MG/DL — HIGH (ref 70–99)
GLUCOSE BLDC GLUCOMTR-MCNC: 140 MG/DL — HIGH (ref 70–99)
GLUCOSE BLDC GLUCOMTR-MCNC: 145 MG/DL — HIGH (ref 70–99)
GLUCOSE BLDC GLUCOMTR-MCNC: 217 MG/DL — HIGH (ref 70–99)

## 2018-10-19 RX ORDER — DEXTROSE 50 % IN WATER 50 %
25 SYRINGE (ML) INTRAVENOUS ONCE
Qty: 0 | Refills: 0 | Status: DISCONTINUED | OUTPATIENT
Start: 2018-10-19 | End: 2018-10-22

## 2018-10-19 RX ORDER — DEXTROSE 50 % IN WATER 50 %
12.5 SYRINGE (ML) INTRAVENOUS ONCE
Qty: 0 | Refills: 0 | Status: DISCONTINUED | OUTPATIENT
Start: 2018-10-19 | End: 2018-10-22

## 2018-10-19 RX ORDER — SODIUM CHLORIDE 9 MG/ML
1000 INJECTION, SOLUTION INTRAVENOUS
Qty: 0 | Refills: 0 | Status: DISCONTINUED | OUTPATIENT
Start: 2018-10-19 | End: 2018-10-22

## 2018-10-19 RX ORDER — GLUCAGON INJECTION, SOLUTION 0.5 MG/.1ML
1 INJECTION, SOLUTION SUBCUTANEOUS ONCE
Qty: 0 | Refills: 0 | Status: DISCONTINUED | OUTPATIENT
Start: 2018-10-19 | End: 2018-10-22

## 2018-10-19 RX ORDER — INSULIN LISPRO 100/ML
VIAL (ML) SUBCUTANEOUS
Qty: 0 | Refills: 0 | Status: DISCONTINUED | OUTPATIENT
Start: 2018-10-19 | End: 2018-10-22

## 2018-10-19 RX ORDER — DEXTROSE 50 % IN WATER 50 %
15 SYRINGE (ML) INTRAVENOUS ONCE
Qty: 0 | Refills: 0 | Status: DISCONTINUED | OUTPATIENT
Start: 2018-10-19 | End: 2018-10-22

## 2018-10-19 RX ADMIN — Medication 10 MILLIGRAM(S): at 11:39

## 2018-10-19 RX ADMIN — HEPARIN SODIUM 5000 UNIT(S): 5000 INJECTION INTRAVENOUS; SUBCUTANEOUS at 05:17

## 2018-10-19 RX ADMIN — SIMVASTATIN 10 MILLIGRAM(S): 20 TABLET, FILM COATED ORAL at 22:30

## 2018-10-19 RX ADMIN — Medication 30 MILLIGRAM(S): at 05:16

## 2018-10-19 RX ADMIN — Medication 81 MILLIGRAM(S): at 11:39

## 2018-10-19 RX ADMIN — Medication 325 MILLIGRAM(S): at 11:39

## 2018-10-19 RX ADMIN — LEVETIRACETAM 500 MILLIGRAM(S): 250 TABLET, FILM COATED ORAL at 20:25

## 2018-10-19 RX ADMIN — Medication 1 MILLIGRAM(S): at 11:39

## 2018-10-19 RX ADMIN — HEPARIN SODIUM 5000 UNIT(S): 5000 INJECTION INTRAVENOUS; SUBCUTANEOUS at 20:25

## 2018-10-19 RX ADMIN — Medication 250 MILLIGRAM(S): at 22:30

## 2018-10-19 RX ADMIN — Medication 1000 UNIT(S): at 11:40

## 2018-10-19 RX ADMIN — LEVETIRACETAM 500 MILLIGRAM(S): 250 TABLET, FILM COATED ORAL at 05:17

## 2018-10-19 RX ADMIN — Medication 250 MILLIGRAM(S): at 05:15

## 2018-10-19 RX ADMIN — LOSARTAN POTASSIUM 25 MILLIGRAM(S): 100 TABLET, FILM COATED ORAL at 05:16

## 2018-10-19 NOTE — DIETITIAN INITIAL EVALUATION ADULT. - PROBLEM SELECTOR PLAN 2
Patient is also c/o cough, chest X-ray was done, no acute disease   -F/u RVP panel  -F/u blood cx  -Will continue with tapering dose of steroids.   - Will continue cefuroxime for 2 days as patient has 2 more days remaining to complete his antibiotic course for Pneumonia.

## 2018-10-19 NOTE — DIETITIAN INITIAL EVALUATION ADULT. - PROBLEM SELECTOR PLAN 7
Patient's blood pressure is noted to be towards lower side.  -Will hold losartan for now, may restart if needed.

## 2018-10-19 NOTE — PROGRESS NOTE ADULT - SUBJECTIVE AND OBJECTIVE BOX
NP Note discussed with  Primary Attending    Patient is a 83y old  Male who presents with a chief complaint of Failure to thrive (18 Oct 2018 12:27)      INTERVAL HPI/OVERNIGHT EVENTS: no new complaints    MEDICATIONS  (STANDING):  aspirin  chewable 81 milliGRAM(s) Oral daily  cefuroxime   Tablet 250 milliGRAM(s) Oral every 12 hours  cholecalciferol 1000 Unit(s) Oral daily  ferrous    sulfate 325 milliGRAM(s) Oral daily  folic acid 1 milliGRAM(s) Oral daily  heparin  Injectable 5000 Unit(s) SubCutaneous every 12 hours  levETIRAcetam 500 milliGRAM(s) Oral two times a day  losartan 25 milliGRAM(s) Oral daily  PARoxetine 10 milliGRAM(s) Oral daily  predniSONE   Tablet   Oral   predniSONE   Tablet 30 milliGRAM(s) Oral daily  simvastatin 10 milliGRAM(s) Oral at bedtime    MEDICATIONS  (PRN):  ALBUTerol    90 MICROgram(s) HFA Inhaler 2 Puff(s) Inhalation every 6 hours PRN Shortness of Breath      __________________________________________________  REVIEW OF SYSTEMS:    unable 2/2 dementia      Vital Signs Last 24 Hrs  T(C): 36.5 (19 Oct 2018 13:10), Max: 36.7 (18 Oct 2018 20:58)  T(F): 97.7 (19 Oct 2018 13:10), Max: 98.1 (18 Oct 2018 20:58)  HR: 84 (19 Oct 2018 13:10) (80 - 84)  BP: 127/66 (19 Oct 2018 13:10) (111/58 - 127/66)  BP(mean): 76 (19 Oct 2018 13:10) (76 - 76)  RR: 16 (19 Oct 2018 13:10) (16 - 18)  SpO2: 96% (19 Oct 2018 13:10) (96% - 98%)    ________________________________________________  PHYSICAL EXAM:  GENERAL: NAD  HEENT: Normocephalic;  conjunctivae and sclerae clear;  NECK : supple  CHEST/LUNG: no wheezing today. Poor effort  HEART: S1 S2   ABDOMEN: Soft, Nontender, Nondistended; Bowel sounds present  positive pedals   SKIN: warm and dry; no rash  NERVOUS SYSTEM:  Awake and alert, pleasantly confused    _________________________________________________  LABS:                        9.7    5.3   )-----------( 202      ( 18 Oct 2018 05:54 )             30.3     10-18    143  |  108  |  23<H>  ----------------------------<  101<H>  4.3   |  28  |  0.87    Ca    8.9      18 Oct 2018 05:54  Phos  2.7     10-18  Mg     2.4     10-18    TPro  8.0  /  Alb  3.0<L>  /  TBili  0.4  /  DBili  x   /  AST  32  /  ALT  23  /  AlkPhos  51  10-17    PT/INR - ( 17 Oct 2018 14:59 )   PT: 14.4 sec;   INR: 1.31 ratio         PTT - ( 17 Oct 2018 14:59 )  PTT:28.7 sec    CAPILLARY BLOOD GLUCOSE      POCT Blood Glucose.: 217 mg/dL (19 Oct 2018 11:42)  POCT Blood Glucose.: 118 mg/dL (19 Oct 2018 07:53)  POCT Blood Glucose.: 151 mg/dL (18 Oct 2018 21:53)  POCT Blood Glucose.: 167 mg/dL (18 Oct 2018 16:55)        RADIOLOGY & ADDITIONAL TESTS:    Imaging Personally Reviewed:  YES/NO    Consultant(s) Notes Reviewed:   YES/ No    Care Discussed with Consultants :     Plan of care was discussed with patient and /or primary care giver; all questions and concerns were addressed and care was aligned with patient's wishes.

## 2018-10-19 NOTE — DIETITIAN INITIAL EVALUATION ADULT. - PROBLEM SELECTOR PLAN 1
-Patient is elderly male, with c/o decreased poor intake.  -Will encourage oral intake and start IV fluid  -F/u nutrition consult.   -Patient is elderly male and is unable to take care of himself. Will get PT consult.

## 2018-10-19 NOTE — DIETITIAN INITIAL EVALUATION ADULT. - OTHER INFO
Pt visited. Pt is alert and verbal. Pt is [place] speaking. With help of [employer]  id # [id number]. Pt  Reports good appetite. Pt was  able to answer to some question .  Unable to answer weight hx. Per PCA pt ate good. Pt seen for Nutrition consult.

## 2018-10-20 LAB
ANION GAP SERPL CALC-SCNC: 6 MMOL/L — SIGNIFICANT CHANGE UP (ref 5–17)
BUN SERPL-MCNC: 24 MG/DL — HIGH (ref 7–18)
CALCIUM SERPL-MCNC: 9 MG/DL — SIGNIFICANT CHANGE UP (ref 8.4–10.5)
CHLORIDE SERPL-SCNC: 108 MMOL/L — SIGNIFICANT CHANGE UP (ref 96–108)
CO2 SERPL-SCNC: 27 MMOL/L — SIGNIFICANT CHANGE UP (ref 22–31)
CREAT SERPL-MCNC: 0.86 MG/DL — SIGNIFICANT CHANGE UP (ref 0.5–1.3)
GLUCOSE BLDC GLUCOMTR-MCNC: 104 MG/DL — HIGH (ref 70–99)
GLUCOSE BLDC GLUCOMTR-MCNC: 124 MG/DL — HIGH (ref 70–99)
GLUCOSE BLDC GLUCOMTR-MCNC: 128 MG/DL — HIGH (ref 70–99)
GLUCOSE BLDC GLUCOMTR-MCNC: 159 MG/DL — HIGH (ref 70–99)
GLUCOSE SERPL-MCNC: 102 MG/DL — HIGH (ref 70–99)
HCT VFR BLD CALC: 29.1 % — LOW (ref 39–50)
HGB BLD-MCNC: 9.4 G/DL — LOW (ref 13–17)
MCHC RBC-ENTMCNC: 32.4 GM/DL — SIGNIFICANT CHANGE UP (ref 32–36)
MCHC RBC-ENTMCNC: 33.5 PG — SIGNIFICANT CHANGE UP (ref 27–34)
MCV RBC AUTO: 103.3 FL — HIGH (ref 80–100)
PLATELET # BLD AUTO: 216 K/UL — SIGNIFICANT CHANGE UP (ref 150–400)
POTASSIUM SERPL-MCNC: 3.8 MMOL/L — SIGNIFICANT CHANGE UP (ref 3.5–5.3)
POTASSIUM SERPL-SCNC: 3.8 MMOL/L — SIGNIFICANT CHANGE UP (ref 3.5–5.3)
RBC # BLD: 2.82 M/UL — LOW (ref 4.2–5.8)
RBC # FLD: 12.8 % — SIGNIFICANT CHANGE UP (ref 10.3–14.5)
SODIUM SERPL-SCNC: 141 MMOL/L — SIGNIFICANT CHANGE UP (ref 135–145)
WBC # BLD: 6.1 K/UL — SIGNIFICANT CHANGE UP (ref 3.8–10.5)
WBC # FLD AUTO: 6.1 K/UL — SIGNIFICANT CHANGE UP (ref 3.8–10.5)

## 2018-10-20 RX ADMIN — SIMVASTATIN 10 MILLIGRAM(S): 20 TABLET, FILM COATED ORAL at 21:43

## 2018-10-20 RX ADMIN — Medication 30 MILLIGRAM(S): at 06:06

## 2018-10-20 RX ADMIN — LEVETIRACETAM 500 MILLIGRAM(S): 250 TABLET, FILM COATED ORAL at 06:06

## 2018-10-20 RX ADMIN — Medication 1000 UNIT(S): at 11:53

## 2018-10-20 RX ADMIN — HEPARIN SODIUM 5000 UNIT(S): 5000 INJECTION INTRAVENOUS; SUBCUTANEOUS at 06:06

## 2018-10-20 RX ADMIN — Medication 10 MILLIGRAM(S): at 11:53

## 2018-10-20 RX ADMIN — Medication 1: at 11:57

## 2018-10-20 RX ADMIN — Medication 1 MILLIGRAM(S): at 11:53

## 2018-10-20 RX ADMIN — Medication 81 MILLIGRAM(S): at 11:53

## 2018-10-20 RX ADMIN — LEVETIRACETAM 500 MILLIGRAM(S): 250 TABLET, FILM COATED ORAL at 17:22

## 2018-10-20 RX ADMIN — Medication 325 MILLIGRAM(S): at 11:53

## 2018-10-20 RX ADMIN — HEPARIN SODIUM 5000 UNIT(S): 5000 INJECTION INTRAVENOUS; SUBCUTANEOUS at 17:22

## 2018-10-20 RX ADMIN — LOSARTAN POTASSIUM 25 MILLIGRAM(S): 100 TABLET, FILM COATED ORAL at 06:06

## 2018-10-20 NOTE — PROGRESS NOTE ADULT - SUBJECTIVE AND OBJECTIVE BOX
NP Note discussed with  Primary Attending    Patient is a 83y old  Male who presents with a chief complaint of Failure to thrive (18 Oct 2018 12:27)      INTERVAL HPI/OVERNIGHT EVENTS: no new complaints    MEDICATIONS  (STANDING):  aspirin  chewable 81 milliGRAM(s) Oral daily  cholecalciferol 1000 Unit(s) Oral daily  dextrose 5%. 1000 milliLiter(s) (50 mL/Hr) IV Continuous <Continuous>  dextrose 50% Injectable 12.5 Gram(s) IV Push once  dextrose 50% Injectable 25 Gram(s) IV Push once  dextrose 50% Injectable 25 Gram(s) IV Push once  ferrous    sulfate 325 milliGRAM(s) Oral daily  folic acid 1 milliGRAM(s) Oral daily  heparin  Injectable 5000 Unit(s) SubCutaneous every 12 hours  insulin lispro (HumaLOG) corrective regimen sliding scale   SubCutaneous three times a day before meals  levETIRAcetam 500 milliGRAM(s) Oral two times a day  losartan 25 milliGRAM(s) Oral daily  PARoxetine 10 milliGRAM(s) Oral daily  predniSONE   Tablet   Oral   simvastatin 10 milliGRAM(s) Oral at bedtime    MEDICATIONS  (PRN):  ALBUTerol    90 MICROgram(s) HFA Inhaler 2 Puff(s) Inhalation every 6 hours PRN Shortness of Breath  dextrose 40% Gel 15 Gram(s) Oral once PRN Blood Glucose LESS THAN 70 milliGRAM(s)/deciliter  glucagon  Injectable 1 milliGRAM(s) IntraMuscular once PRN Glucose LESS THAN 70 milligrams/deciliter    Vital Signs Last 24 Hrs  T(C): 37.4 (20 Oct 2018 14:32), Max: 37.4 (20 Oct 2018 14:32)  T(F): 99.4 (20 Oct 2018 14:32), Max: 99.4 (20 Oct 2018 14:32)  HR: 80 (20 Oct 2018 14:32) (66 - 80)  BP: 122/61 (20 Oct 2018 14:32) (122/61 - 135/68)  BP(mean): --  RR: 14 (20 Oct 2018 14:32) (14 - 14)  SpO2: 94% (20 Oct 2018 14:32) (93% - 94%)  ________________________________________________  PHYSICAL EXAM:  GENERAL: NAD  HEENT: Normocephalic;  conjunctivae and sclerae clear;  NECK : supple  CHEST/LUNG: no wheezing today. Poor effort  HEART: S1 S2   ABDOMEN: Soft, Nontender, Nondistended; Bowel sounds present  positive pedals   SKIN: warm and dry; no rash  NERVOUS SYSTEM:  Awake and alert, pleasantly confused    _________________________________________________  LABS:  10-20    141  |  108  |  24<H>  ----------------------------<  102<H>  3.8   |  27  |  0.86    Ca    9.0      20 Oct 2018 06:52      Creatinine Trend: 0.86 <--, 0.87 <--, 1.11 <--, 0.87 <--                        9.4    6.1   )-----------( 216      ( 20 Oct 2018 06:52 )             29.1     Urine Studies:                      RADIOLOGY & ADDITIONAL TESTS:    Imaging Personally Reviewed:  YES/NO    Consultant(s) Notes Reviewed:   YES/ No    Care Discussed with Consultants :     Plan of care was discussed with patient and /or primary care giver; all questions and concerns were addressed and care was aligned with patient's wishes.

## 2018-10-20 NOTE — PROGRESS NOTE ADULT - PROBLEM SELECTOR PLAN 1
Pt with functional decline and decreased appetite.  as per nursing staff , pt been eating with out difficulty , Plan is for NATY

## 2018-10-21 LAB
GLUCOSE BLDC GLUCOMTR-MCNC: 103 MG/DL — HIGH (ref 70–99)
GLUCOSE BLDC GLUCOMTR-MCNC: 132 MG/DL — HIGH (ref 70–99)
GLUCOSE BLDC GLUCOMTR-MCNC: 176 MG/DL — HIGH (ref 70–99)
GLUCOSE BLDC GLUCOMTR-MCNC: 97 MG/DL — SIGNIFICANT CHANGE UP (ref 70–99)
HCT VFR BLD CALC: 30.1 % — LOW (ref 39–50)
HGB BLD-MCNC: 9.6 G/DL — LOW (ref 13–17)
MCHC RBC-ENTMCNC: 32 GM/DL — SIGNIFICANT CHANGE UP (ref 32–36)
MCHC RBC-ENTMCNC: 33.2 PG — SIGNIFICANT CHANGE UP (ref 27–34)
MCV RBC AUTO: 103.5 FL — HIGH (ref 80–100)
PLATELET # BLD AUTO: 211 K/UL — SIGNIFICANT CHANGE UP (ref 150–400)
RBC # BLD: 2.9 M/UL — LOW (ref 4.2–5.8)
RBC # FLD: 12.8 % — SIGNIFICANT CHANGE UP (ref 10.3–14.5)
WBC # BLD: 6.6 K/UL — SIGNIFICANT CHANGE UP (ref 3.8–10.5)
WBC # FLD AUTO: 6.6 K/UL — SIGNIFICANT CHANGE UP (ref 3.8–10.5)

## 2018-10-21 RX ADMIN — Medication 81 MILLIGRAM(S): at 11:48

## 2018-10-21 RX ADMIN — Medication 1000 UNIT(S): at 11:48

## 2018-10-21 RX ADMIN — HEPARIN SODIUM 5000 UNIT(S): 5000 INJECTION INTRAVENOUS; SUBCUTANEOUS at 05:36

## 2018-10-21 RX ADMIN — LOSARTAN POTASSIUM 25 MILLIGRAM(S): 100 TABLET, FILM COATED ORAL at 05:36

## 2018-10-21 RX ADMIN — Medication 1: at 11:48

## 2018-10-21 RX ADMIN — Medication 10 MILLIGRAM(S): at 11:48

## 2018-10-21 RX ADMIN — Medication 20 MILLIGRAM(S): at 05:36

## 2018-10-21 RX ADMIN — SIMVASTATIN 10 MILLIGRAM(S): 20 TABLET, FILM COATED ORAL at 22:18

## 2018-10-21 RX ADMIN — Medication 325 MILLIGRAM(S): at 11:48

## 2018-10-21 RX ADMIN — HEPARIN SODIUM 5000 UNIT(S): 5000 INJECTION INTRAVENOUS; SUBCUTANEOUS at 17:24

## 2018-10-21 RX ADMIN — LEVETIRACETAM 500 MILLIGRAM(S): 250 TABLET, FILM COATED ORAL at 05:36

## 2018-10-21 RX ADMIN — LEVETIRACETAM 500 MILLIGRAM(S): 250 TABLET, FILM COATED ORAL at 17:24

## 2018-10-21 RX ADMIN — Medication 1 MILLIGRAM(S): at 11:48

## 2018-10-21 NOTE — PROGRESS NOTE ADULT - PROBLEM SELECTOR PLAN 3
Pt  to continue Heparin.
Losartan currently on hold due to hypotension.

## 2018-10-21 NOTE — PROGRESS NOTE ADULT - PROBLEM SELECTOR PLAN 2
PT needs NATY for functional decline and  poor intake. Albumin is 3.0
CXR No acute pulmonary disease noted.  Will complete full course of cefuroxime 1 more day.

## 2018-10-21 NOTE — PROGRESS NOTE ADULT - SUBJECTIVE AND OBJECTIVE BOX
NP Note discussed with  Primary Attending    Patient is a 83y old  Male who presents with a chief complaint of Failure to thrive (18 Oct 2018 12:27)      INTERVAL HPI/OVERNIGHT EVENTS: no new complaints    MEDICATIONS  (STANDING):  aspirin  chewable 81 milliGRAM(s) Oral daily  cholecalciferol 1000 Unit(s) Oral daily  dextrose 5%. 1000 milliLiter(s) (50 mL/Hr) IV Continuous <Continuous>  dextrose 50% Injectable 12.5 Gram(s) IV Push once  dextrose 50% Injectable 25 Gram(s) IV Push once  dextrose 50% Injectable 25 Gram(s) IV Push once  ferrous    sulfate 325 milliGRAM(s) Oral daily  folic acid 1 milliGRAM(s) Oral daily  heparin  Injectable 5000 Unit(s) SubCutaneous every 12 hours  insulin lispro (HumaLOG) corrective regimen sliding scale   SubCutaneous three times a day before meals  levETIRAcetam 500 milliGRAM(s) Oral two times a day  losartan 25 milliGRAM(s) Oral daily  PARoxetine 10 milliGRAM(s) Oral daily  predniSONE   Tablet   Oral   predniSONE   Tablet 20 milliGRAM(s) Oral daily  simvastatin 10 milliGRAM(s) Oral at bedtime    MEDICATIONS  (PRN):  ALBUTerol    90 MICROgram(s) HFA Inhaler 2 Puff(s) Inhalation every 6 hours PRN Shortness of Breath  dextrose 40% Gel 15 Gram(s) Oral once PRN Blood Glucose LESS THAN 70 milliGRAM(s)/deciliter  glucagon  Injectable 1 milliGRAM(s) IntraMuscular once PRN Glucose LESS THAN 70 milligrams/deciliter    Vital Signs Last 24 Hrs  T(C): 36.4 (21 Oct 2018 20:55), Max: 36.8 (21 Oct 2018 05:26)  T(F): 97.6 (21 Oct 2018 20:55), Max: 98.2 (21 Oct 2018 05:26)  HR: 71 (21 Oct 2018 20:55) (70 - 76)  BP: 130/73 (21 Oct 2018 20:55) (130/68 - 131/70)  BP(mean): --  RR: 16 (21 Oct 2018 20:55) (16 - 17)  SpO2: 100% (21 Oct 2018 20:55) (97% - 100%)    PHYSICAL EXAM:  GENERAL: NAD  HEENT: Normocephalic;  conjunctivae and sclerae clear;  NECK : supple  CHEST/LUNG: no wheezing today. Poor effort  HEART: S1 S2   ABDOMEN: Soft, Nontender, Nondistended; Bowel sounds present  positive pedals   SKIN: warm and dry; no rash  NERVOUS SYSTEM:  Awake and alert, pleasantly confused    _________________________________________________  LLABS:  10-20    141  |  108  |  24<H>  ----------------------------<  102<H>  3.8   |  27  |  0.86    Ca    9.0      20 Oct 2018 06:52      Creatinine Trend: 0.86 <--, 0.87 <--, 1.11 <--                        9.6    6.6   )-----------( 211      ( 21 Oct 2018 06:42 )             30.1     Urine Studies:                    RADIOLOGY & ADDITIONAL TESTS:    Imaging Personally Reviewed:  YES/NO    Consultant(s) Notes Reviewed:   YES/ No    Care Discussed with Consultants :     Plan of care was discussed with patient and /or primary care giver; all questions and concerns were addressed and care was aligned with patient's wishes.

## 2018-10-21 NOTE — PROGRESS NOTE ADULT - PROBLEM SELECTOR PROBLEM 3
Need for prophylactic measure
Labile blood pressure

## 2018-10-22 VITALS
SYSTOLIC BLOOD PRESSURE: 110 MMHG | RESPIRATION RATE: 16 BRPM | OXYGEN SATURATION: 98 % | DIASTOLIC BLOOD PRESSURE: 54 MMHG | HEART RATE: 77 BPM | TEMPERATURE: 98 F

## 2018-10-22 LAB
CULTURE RESULTS: SIGNIFICANT CHANGE UP
CULTURE RESULTS: SIGNIFICANT CHANGE UP
GLUCOSE BLDC GLUCOMTR-MCNC: 113 MG/DL — HIGH (ref 70–99)
GLUCOSE BLDC GLUCOMTR-MCNC: 124 MG/DL — HIGH (ref 70–99)
GLUCOSE BLDC GLUCOMTR-MCNC: 277 MG/DL — HIGH (ref 70–99)
HCT VFR BLD CALC: 30.8 % — LOW (ref 39–50)
HGB BLD-MCNC: 9.8 G/DL — LOW (ref 13–17)
MCHC RBC-ENTMCNC: 31.6 GM/DL — LOW (ref 32–36)
MCHC RBC-ENTMCNC: 32.7 PG — SIGNIFICANT CHANGE UP (ref 27–34)
MCV RBC AUTO: 103.2 FL — HIGH (ref 80–100)
PLATELET # BLD AUTO: 217 K/UL — SIGNIFICANT CHANGE UP (ref 150–400)
RBC # BLD: 2.99 M/UL — LOW (ref 4.2–5.8)
RBC # FLD: 13 % — SIGNIFICANT CHANGE UP (ref 10.3–14.5)
SPECIMEN SOURCE: SIGNIFICANT CHANGE UP
SPECIMEN SOURCE: SIGNIFICANT CHANGE UP
WBC # BLD: 7.5 K/UL — SIGNIFICANT CHANGE UP (ref 3.8–10.5)
WBC # FLD AUTO: 7.5 K/UL — SIGNIFICANT CHANGE UP (ref 3.8–10.5)

## 2018-10-22 RX ORDER — IBUPROFEN 200 MG
600 TABLET ORAL ONCE
Qty: 0 | Refills: 0 | Status: DISCONTINUED | OUTPATIENT
Start: 2018-10-22 | End: 2018-10-22

## 2018-10-22 RX ORDER — SIMVASTATIN 20 MG/1
1 TABLET, FILM COATED ORAL
Qty: 0 | Refills: 0 | DISCHARGE
Start: 2018-10-22

## 2018-10-22 RX ORDER — SIMVASTATIN 20 MG/1
1 TABLET, FILM COATED ORAL
Qty: 0 | Refills: 0 | COMMUNITY

## 2018-10-22 RX ADMIN — Medication 10 MILLIGRAM(S): at 11:26

## 2018-10-22 RX ADMIN — Medication 1 MILLIGRAM(S): at 11:26

## 2018-10-22 RX ADMIN — Medication 325 MILLIGRAM(S): at 11:26

## 2018-10-22 RX ADMIN — Medication 3: at 12:07

## 2018-10-22 RX ADMIN — Medication 1000 UNIT(S): at 11:26

## 2018-10-22 RX ADMIN — Medication 81 MILLIGRAM(S): at 11:26

## 2018-10-22 RX ADMIN — Medication 20 MILLIGRAM(S): at 05:19

## 2018-10-22 RX ADMIN — LEVETIRACETAM 500 MILLIGRAM(S): 250 TABLET, FILM COATED ORAL at 05:19

## 2018-10-22 RX ADMIN — HEPARIN SODIUM 5000 UNIT(S): 5000 INJECTION INTRAVENOUS; SUBCUTANEOUS at 05:19

## 2018-10-22 RX ADMIN — LOSARTAN POTASSIUM 25 MILLIGRAM(S): 100 TABLET, FILM COATED ORAL at 05:19

## 2018-11-11 PROCEDURE — 80061 LIPID PANEL: CPT

## 2018-11-11 PROCEDURE — 84100 ASSAY OF PHOSPHORUS: CPT

## 2018-11-11 PROCEDURE — 84484 ASSAY OF TROPONIN QUANT: CPT

## 2018-11-11 PROCEDURE — 71045 X-RAY EXAM CHEST 1 VIEW: CPT

## 2018-11-11 PROCEDURE — 87633 RESP VIRUS 12-25 TARGETS: CPT

## 2018-11-11 PROCEDURE — 82728 ASSAY OF FERRITIN: CPT

## 2018-11-11 PROCEDURE — 87798 DETECT AGENT NOS DNA AMP: CPT

## 2018-11-11 PROCEDURE — 87486 CHLMYD PNEUM DNA AMP PROBE: CPT

## 2018-11-11 PROCEDURE — 82607 VITAMIN B-12: CPT

## 2018-11-11 PROCEDURE — 80053 COMPREHEN METABOLIC PANEL: CPT

## 2018-11-11 PROCEDURE — 87581 M.PNEUMON DNA AMP PROBE: CPT

## 2018-11-11 PROCEDURE — 97163 PT EVAL HIGH COMPLEX 45 MIN: CPT

## 2018-11-11 PROCEDURE — 84443 ASSAY THYROID STIM HORMONE: CPT

## 2018-11-11 PROCEDURE — 83735 ASSAY OF MAGNESIUM: CPT

## 2018-11-11 PROCEDURE — 85610 PROTHROMBIN TIME: CPT

## 2018-11-11 PROCEDURE — 87040 BLOOD CULTURE FOR BACTERIA: CPT

## 2018-11-11 PROCEDURE — 85730 THROMBOPLASTIN TIME PARTIAL: CPT

## 2018-11-11 PROCEDURE — 80048 BASIC METABOLIC PNL TOTAL CA: CPT

## 2018-11-11 PROCEDURE — 84145 PROCALCITONIN (PCT): CPT

## 2018-11-11 PROCEDURE — 71275 CT ANGIOGRAPHY CHEST: CPT

## 2018-11-11 PROCEDURE — 83550 IRON BINDING TEST: CPT

## 2018-11-11 PROCEDURE — 82746 ASSAY OF FOLIC ACID SERUM: CPT

## 2018-11-11 PROCEDURE — 93005 ELECTROCARDIOGRAM TRACING: CPT

## 2018-11-11 PROCEDURE — 82962 GLUCOSE BLOOD TEST: CPT

## 2018-11-11 PROCEDURE — 99285 EMERGENCY DEPT VISIT HI MDM: CPT | Mod: 25

## 2018-11-11 PROCEDURE — 83605 ASSAY OF LACTIC ACID: CPT

## 2018-11-11 PROCEDURE — 85027 COMPLETE CBC AUTOMATED: CPT

## 2018-11-11 PROCEDURE — 82553 CREATINE MB FRACTION: CPT

## 2018-11-11 PROCEDURE — 83036 HEMOGLOBIN GLYCOSYLATED A1C: CPT

## 2018-11-11 PROCEDURE — 82550 ASSAY OF CK (CPK): CPT

## 2018-11-11 PROCEDURE — 93306 TTE W/DOPPLER COMPLETE: CPT

## 2020-03-30 ENCOUNTER — INPATIENT (INPATIENT)
Facility: HOSPITAL | Age: 85
LOS: 0 days | Discharge: ANOTHER HOSPITAL | DRG: 871 | End: 2020-03-31
Attending: INTERNAL MEDICINE | Admitting: INTERNAL MEDICINE
Payer: MEDICARE

## 2020-03-30 VITALS
RESPIRATION RATE: 19 BRPM | DIASTOLIC BLOOD PRESSURE: 80 MMHG | WEIGHT: 138.01 LBS | TEMPERATURE: 102 F | OXYGEN SATURATION: 96 % | HEIGHT: 65 IN | SYSTOLIC BLOOD PRESSURE: 132 MMHG | HEART RATE: 115 BPM

## 2020-03-30 DIAGNOSIS — E11.9 TYPE 2 DIABETES MELLITUS WITHOUT COMPLICATIONS: ICD-10-CM

## 2020-03-30 LAB
ALBUMIN SERPL ELPH-MCNC: 3.3 G/DL — LOW (ref 3.5–5)
ALP SERPL-CCNC: 61 U/L — SIGNIFICANT CHANGE UP (ref 40–120)
ALT FLD-CCNC: 21 U/L DA — SIGNIFICANT CHANGE UP (ref 10–60)
ANION GAP SERPL CALC-SCNC: 8 MMOL/L — SIGNIFICANT CHANGE UP (ref 5–17)
APTT BLD: 30.8 SEC — SIGNIFICANT CHANGE UP (ref 27.5–36.3)
AST SERPL-CCNC: 29 U/L — SIGNIFICANT CHANGE UP (ref 10–40)
BASOPHILS # BLD AUTO: 0 K/UL — SIGNIFICANT CHANGE UP (ref 0–0.2)
BASOPHILS NFR BLD AUTO: 0 % — SIGNIFICANT CHANGE UP (ref 0–2)
BILIRUB SERPL-MCNC: 0.4 MG/DL — SIGNIFICANT CHANGE UP (ref 0.2–1.2)
BUN SERPL-MCNC: 36 MG/DL — HIGH (ref 7–18)
CALCIUM SERPL-MCNC: 9.4 MG/DL — SIGNIFICANT CHANGE UP (ref 8.4–10.5)
CHLORIDE SERPL-SCNC: 105 MMOL/L — SIGNIFICANT CHANGE UP (ref 96–108)
CO2 SERPL-SCNC: 28 MMOL/L — SIGNIFICANT CHANGE UP (ref 22–31)
CREAT SERPL-MCNC: 1.14 MG/DL — SIGNIFICANT CHANGE UP (ref 0.5–1.3)
EOSINOPHIL # BLD AUTO: 0.07 K/UL — SIGNIFICANT CHANGE UP (ref 0–0.5)
EOSINOPHIL NFR BLD AUTO: 1 % — SIGNIFICANT CHANGE UP (ref 0–6)
GLUCOSE SERPL-MCNC: 107 MG/DL — HIGH (ref 70–99)
HCT VFR BLD CALC: 37.4 % — LOW (ref 39–50)
HGB BLD-MCNC: 11.8 G/DL — LOW (ref 13–17)
INR BLD: 1.18 RATIO — HIGH (ref 0.88–1.16)
LYMPHOCYTES # BLD AUTO: 1.89 K/UL — SIGNIFICANT CHANGE UP (ref 1–3.3)
LYMPHOCYTES # BLD AUTO: 27 % — SIGNIFICANT CHANGE UP (ref 13–44)
MCHC RBC-ENTMCNC: 31.6 GM/DL — LOW (ref 32–36)
MCHC RBC-ENTMCNC: 33.8 PG — SIGNIFICANT CHANGE UP (ref 27–34)
MCV RBC AUTO: 107.2 FL — HIGH (ref 80–100)
MONOCYTES # BLD AUTO: 1.4 K/UL — HIGH (ref 0–0.9)
MONOCYTES NFR BLD AUTO: 20 % — HIGH (ref 2–14)
NEUTROPHILS # BLD AUTO: 3.01 K/UL — SIGNIFICANT CHANGE UP (ref 1.8–7.4)
NEUTROPHILS NFR BLD AUTO: 43 % — SIGNIFICANT CHANGE UP (ref 43–77)
PLATELET # BLD AUTO: 155 K/UL — SIGNIFICANT CHANGE UP (ref 150–400)
POTASSIUM SERPL-MCNC: 4.3 MMOL/L — SIGNIFICANT CHANGE UP (ref 3.5–5.3)
POTASSIUM SERPL-SCNC: 4.3 MMOL/L — SIGNIFICANT CHANGE UP (ref 3.5–5.3)
PROT SERPL-MCNC: 8.5 G/DL — HIGH (ref 6–8.3)
PROTHROM AB SERPL-ACNC: 13.4 SEC — HIGH (ref 10–12.9)
RBC # BLD: 3.49 M/UL — LOW (ref 4.2–5.8)
RBC # FLD: 14.1 % — SIGNIFICANT CHANGE UP (ref 10.3–14.5)
SODIUM SERPL-SCNC: 141 MMOL/L — SIGNIFICANT CHANGE UP (ref 135–145)
WBC # BLD: 7 K/UL — SIGNIFICANT CHANGE UP (ref 3.8–10.5)
WBC # FLD AUTO: 7 K/UL — SIGNIFICANT CHANGE UP (ref 3.8–10.5)

## 2020-03-30 PROCEDURE — 99285 EMERGENCY DEPT VISIT HI MDM: CPT

## 2020-03-30 PROCEDURE — 71045 X-RAY EXAM CHEST 1 VIEW: CPT | Mod: 26

## 2020-03-30 RX ORDER — ACETAMINOPHEN 500 MG
650 TABLET ORAL ONCE
Refills: 0 | Status: COMPLETED | OUTPATIENT
Start: 2020-03-30 | End: 2020-03-30

## 2020-03-30 NOTE — ED PROVIDER NOTE - OBJECTIVE STATEMENT
84 M past medical history extensive including Hypertension, COPD, diabetes type 2, seizures d/o presents with AMS, cough, fever.  Patient non-verbal.

## 2020-03-30 NOTE — ED PROVIDER NOTE - PHYSICAL EXAMINATION
VITAL SIGNS: I have reviewed nursing notes and confirm.  CONSTITUTIONAL:  in no distress.  SKIN: Skin exam is warm and dry, no acute rash.  HEAD: Normocephalic; atraumatic.  EYES: PERRL, EOM intact; conjunctiva and sclera clear.  ENT: No nasal discharge; airway clear. TMs clear.  NECK: Supple; non tender.  CARD: S1, S2 normal; no murmurs, gallops, or rubs. Regular rate and rhythm.  RESP: B/l course breath sounds.  mildrespiratory distress.  ABD: Normal bowel sounds; soft; non-distended; non-tender; no hepatosplenomegaly.  EXT: Normal ROM. No clubbing, cyanosis or edema.  LYMPH: No acute cervical adenopathy.  NEURO: Alert, oriented. Grossly unremarkable. No focal deficits.  PSYCH: appropriate

## 2020-03-31 VITALS
HEART RATE: 91 BPM | RESPIRATION RATE: 18 BRPM | SYSTOLIC BLOOD PRESSURE: 135 MMHG | DIASTOLIC BLOOD PRESSURE: 61 MMHG | OXYGEN SATURATION: 97 % | TEMPERATURE: 99 F

## 2020-03-31 DIAGNOSIS — G93.40 ENCEPHALOPATHY, UNSPECIFIED: ICD-10-CM

## 2020-03-31 DIAGNOSIS — I10 ESSENTIAL (PRIMARY) HYPERTENSION: ICD-10-CM

## 2020-03-31 DIAGNOSIS — Z29.9 ENCOUNTER FOR PROPHYLACTIC MEASURES, UNSPECIFIED: ICD-10-CM

## 2020-03-31 DIAGNOSIS — R56.9 UNSPECIFIED CONVULSIONS: ICD-10-CM

## 2020-03-31 DIAGNOSIS — J96.01 ACUTE RESPIRATORY FAILURE WITH HYPOXIA: ICD-10-CM

## 2020-03-31 LAB
4/8 RATIO: 1.12 RATIO — SIGNIFICANT CHANGE UP (ref 0.86–4.14)
ABS CD8: 259 /UL — SIGNIFICANT CHANGE UP (ref 90–775)
ALBUMIN SERPL ELPH-MCNC: 2.9 G/DL — LOW (ref 3.5–5)
ALP SERPL-CCNC: 51 U/L — SIGNIFICANT CHANGE UP (ref 40–120)
ALT FLD-CCNC: 17 U/L DA — SIGNIFICANT CHANGE UP (ref 10–60)
ANION GAP SERPL CALC-SCNC: 6 MMOL/L — SIGNIFICANT CHANGE UP (ref 5–17)
AST SERPL-CCNC: 26 U/L — SIGNIFICANT CHANGE UP (ref 10–40)
BASOPHILS # BLD AUTO: 0.02 K/UL — SIGNIFICANT CHANGE UP (ref 0–0.2)
BASOPHILS NFR BLD AUTO: 0.3 % — SIGNIFICANT CHANGE UP (ref 0–2)
BILIRUB SERPL-MCNC: 0.4 MG/DL — SIGNIFICANT CHANGE UP (ref 0.2–1.2)
BUN SERPL-MCNC: 30 MG/DL — HIGH (ref 7–18)
CALCIUM SERPL-MCNC: 8.9 MG/DL — SIGNIFICANT CHANGE UP (ref 8.4–10.5)
CD16+CD56+ CELLS NFR BLD: 43 % — HIGH (ref 7–27)
CD16+CD56+ CELLS NFR SPEC: 720 /UL — HIGH (ref 80–426)
CD19 BLASTS SPEC-ACNC: 21 % — HIGH (ref 4–18)
CD19 BLASTS SPEC-ACNC: 356 /UL — HIGH (ref 32–326)
CD3 BLASTS SPEC-ACNC: 34 % — LOW (ref 58–84)
CD3 BLASTS SPEC-ACNC: 561 /UL — SIGNIFICANT CHANGE UP (ref 396–2024)
CD4 %: 18 % — LOW (ref 30–56)
CD8 %: 16 % — SIGNIFICANT CHANGE UP (ref 11–43)
CHLORIDE SERPL-SCNC: 112 MMOL/L — HIGH (ref 96–108)
CHOLEST SERPL-MCNC: 102 MG/DL — SIGNIFICANT CHANGE UP (ref 10–199)
CK SERPL-CCNC: 81 U/L — SIGNIFICANT CHANGE UP (ref 35–232)
CO2 SERPL-SCNC: 25 MMOL/L — SIGNIFICANT CHANGE UP (ref 22–31)
CREAT SERPL-MCNC: 1.1 MG/DL — SIGNIFICANT CHANGE UP (ref 0.5–1.3)
EOSINOPHIL # BLD AUTO: 0.02 K/UL — SIGNIFICANT CHANGE UP (ref 0–0.5)
EOSINOPHIL NFR BLD AUTO: 0.3 % — SIGNIFICANT CHANGE UP (ref 0–6)
GLUCOSE SERPL-MCNC: 102 MG/DL — HIGH (ref 70–99)
HBA1C BLD-MCNC: 6 % — HIGH (ref 4–5.6)
HCT VFR BLD CALC: 33.8 % — LOW (ref 39–50)
HDLC SERPL-MCNC: 41 MG/DL — SIGNIFICANT CHANGE UP
HGB BLD-MCNC: 10.5 G/DL — LOW (ref 13–17)
IMM GRANULOCYTES NFR BLD AUTO: 6.2 % — HIGH (ref 0–1.5)
LACTATE SERPL-SCNC: 1 MMOL/L — SIGNIFICANT CHANGE UP (ref 0.7–2)
LDH SERPL L TO P-CCNC: 353 U/L — HIGH (ref 120–225)
LIPID PNL WITH DIRECT LDL SERPL: 44 MG/DL — SIGNIFICANT CHANGE UP
LYMPHOCYTES # BLD AUTO: 1.81 K/UL — SIGNIFICANT CHANGE UP (ref 1–3.3)
LYMPHOCYTES # BLD AUTO: 25.5 % — SIGNIFICANT CHANGE UP (ref 13–44)
MAGNESIUM SERPL-MCNC: 2.6 MG/DL — SIGNIFICANT CHANGE UP (ref 1.6–2.6)
MCHC RBC-ENTMCNC: 31.1 GM/DL — LOW (ref 32–36)
MCHC RBC-ENTMCNC: 33.7 PG — SIGNIFICANT CHANGE UP (ref 27–34)
MCV RBC AUTO: 108.3 FL — HIGH (ref 80–100)
MONOCYTES # BLD AUTO: 1.72 K/UL — HIGH (ref 0–0.9)
MONOCYTES NFR BLD AUTO: 24.2 % — HIGH (ref 2–14)
NEUTROPHILS # BLD AUTO: 3.09 K/UL — SIGNIFICANT CHANGE UP (ref 1.8–7.4)
NEUTROPHILS NFR BLD AUTO: 43.5 % — SIGNIFICANT CHANGE UP (ref 43–77)
NRBC # BLD: 0 /100 WBCS — SIGNIFICANT CHANGE UP (ref 0–0)
PHOSPHATE SERPL-MCNC: 2.9 MG/DL — SIGNIFICANT CHANGE UP (ref 2.5–4.5)
PLATELET # BLD AUTO: 166 K/UL — SIGNIFICANT CHANGE UP (ref 150–400)
POTASSIUM SERPL-MCNC: 4.2 MMOL/L — SIGNIFICANT CHANGE UP (ref 3.5–5.3)
POTASSIUM SERPL-SCNC: 4.2 MMOL/L — SIGNIFICANT CHANGE UP (ref 3.5–5.3)
PROT SERPL-MCNC: 7.8 G/DL — SIGNIFICANT CHANGE UP (ref 6–8.3)
RBC # BLD: 3.12 M/UL — LOW (ref 4.2–5.8)
RBC # FLD: 14 % — SIGNIFICANT CHANGE UP (ref 10.3–14.5)
SODIUM SERPL-SCNC: 143 MMOL/L — SIGNIFICANT CHANGE UP (ref 135–145)
T-CELL CD4 SUBSET PNL BLD: 289 /UL — LOW (ref 325–1251)
TOTAL CHOLESTEROL/HDL RATIO MEASUREMENT: 2.5 RATIO — LOW (ref 3.4–9.6)
TRIGL SERPL-MCNC: 83 MG/DL — SIGNIFICANT CHANGE UP (ref 10–149)
TROPONIN I SERPL-MCNC: <0.015 NG/ML — SIGNIFICANT CHANGE UP (ref 0–0.04)
TSH SERPL-MCNC: 0.61 UU/ML — SIGNIFICANT CHANGE UP (ref 0.34–4.82)
WBC # BLD: 7.1 K/UL — SIGNIFICANT CHANGE UP (ref 3.8–10.5)
WBC # FLD AUTO: 7.1 K/UL — SIGNIFICANT CHANGE UP (ref 3.8–10.5)

## 2020-03-31 RX ORDER — THIAMINE MONONITRATE (VIT B1) 100 MG
1 TABLET ORAL
Qty: 0 | Refills: 0 | DISCHARGE
Start: 2020-03-31

## 2020-03-31 RX ORDER — CEFTRIAXONE 500 MG/1
1 INJECTION, POWDER, FOR SOLUTION INTRAMUSCULAR; INTRAVENOUS
Qty: 0 | Refills: 0 | DISCHARGE
Start: 2020-03-31

## 2020-03-31 RX ORDER — ASPIRIN/CALCIUM CARB/MAGNESIUM 324 MG
81 TABLET ORAL DAILY
Refills: 0 | Status: DISCONTINUED | OUTPATIENT
Start: 2020-03-31 | End: 2020-03-31

## 2020-03-31 RX ORDER — ACETAMINOPHEN 500 MG
2 TABLET ORAL
Qty: 0 | Refills: 0 | DISCHARGE
Start: 2020-03-31

## 2020-03-31 RX ORDER — ASCORBIC ACID 60 MG
1000 TABLET,CHEWABLE ORAL DAILY
Refills: 0 | Status: DISCONTINUED | OUTPATIENT
Start: 2020-03-31 | End: 2020-03-31

## 2020-03-31 RX ORDER — THIAMINE MONONITRATE (VIT B1) 100 MG
100 TABLET ORAL DAILY
Refills: 0 | Status: DISCONTINUED | OUTPATIENT
Start: 2020-03-31 | End: 2020-03-31

## 2020-03-31 RX ORDER — CHOLECALCIFEROL (VITAMIN D3) 125 MCG
1000 CAPSULE ORAL DAILY
Refills: 0 | Status: DISCONTINUED | OUTPATIENT
Start: 2020-03-31 | End: 2020-03-31

## 2020-03-31 RX ORDER — ASCORBIC ACID 60 MG
1 TABLET,CHEWABLE ORAL
Qty: 0 | Refills: 0 | DISCHARGE
Start: 2020-03-31

## 2020-03-31 RX ORDER — ENOXAPARIN SODIUM 100 MG/ML
40 INJECTION SUBCUTANEOUS DAILY
Refills: 0 | Status: DISCONTINUED | OUTPATIENT
Start: 2020-03-31 | End: 2020-03-31

## 2020-03-31 RX ORDER — CEFTRIAXONE 500 MG/1
1000 INJECTION, POWDER, FOR SOLUTION INTRAMUSCULAR; INTRAVENOUS EVERY 24 HOURS
Refills: 0 | Status: DISCONTINUED | OUTPATIENT
Start: 2020-03-31 | End: 2020-03-31

## 2020-03-31 RX ORDER — SIMVASTATIN 20 MG/1
10 TABLET, FILM COATED ORAL AT BEDTIME
Refills: 0 | Status: DISCONTINUED | OUTPATIENT
Start: 2020-03-31 | End: 2020-03-31

## 2020-03-31 RX ORDER — ENOXAPARIN SODIUM 100 MG/ML
40 INJECTION SUBCUTANEOUS
Qty: 0 | Refills: 0 | DISCHARGE
Start: 2020-03-31

## 2020-03-31 RX ORDER — ACETAMINOPHEN 500 MG
650 TABLET ORAL EVERY 6 HOURS
Refills: 0 | Status: DISCONTINUED | OUTPATIENT
Start: 2020-03-31 | End: 2020-03-31

## 2020-03-31 RX ORDER — LOSARTAN POTASSIUM 100 MG/1
1 TABLET, FILM COATED ORAL
Qty: 0 | Refills: 0 | DISCHARGE

## 2020-03-31 RX ORDER — AZITHROMYCIN 500 MG/1
250 TABLET, FILM COATED ORAL DAILY
Refills: 0 | Status: DISCONTINUED | OUTPATIENT
Start: 2020-03-31 | End: 2020-03-31

## 2020-03-31 RX ORDER — AZITHROMYCIN 500 MG/1
1 TABLET, FILM COATED ORAL
Qty: 0 | Refills: 0 | DISCHARGE
Start: 2020-03-31

## 2020-03-31 RX ORDER — ALBUTEROL 90 UG/1
2 AEROSOL, METERED ORAL
Qty: 0 | Refills: 0 | DISCHARGE

## 2020-03-31 RX ORDER — LEVETIRACETAM 250 MG/1
500 TABLET, FILM COATED ORAL
Refills: 0 | Status: DISCONTINUED | OUTPATIENT
Start: 2020-03-31 | End: 2020-03-31

## 2020-03-31 RX ADMIN — CEFTRIAXONE 100 MILLIGRAM(S): 500 INJECTION, POWDER, FOR SOLUTION INTRAMUSCULAR; INTRAVENOUS at 07:48

## 2020-03-31 RX ADMIN — LEVETIRACETAM 500 MILLIGRAM(S): 250 TABLET, FILM COATED ORAL at 07:37

## 2020-03-31 RX ADMIN — Medication 650 MILLIGRAM(S): at 07:37

## 2020-03-31 NOTE — ACUTE INTERFACILITY TRANSFER NOTE - ENOXAPARIN/LOVENOX EDUCATION
Enoxaparin/Lovenox – Follow up Monitoring/Enoxaparin/Lovenox – Potential for Adverse Drug Reaction and Interactions/Enoxaparin/Lovenox/ – Dietary Advice/Enoxaparin/Lovenox/ – Compliance

## 2020-03-31 NOTE — GOALS OF CARE CONVERSATION - ADVANCED CARE PLANNING - WHAT MATTERS MOST
Patient's niece stated her concern is to minimize suffering and insure the patient will remain comfortable.

## 2020-03-31 NOTE — ACUTE INTERFACILITY TRANSFER NOTE - PLAN OF CARE
maintain O2 Sat >90 % O2 support as needed  f/u blood cx. Maintain PO2 WNL f/u blood cx  empiric abx. Supportive care c/w keppra  seizure precautions  aspiration precautions Supportive care and safety precaution

## 2020-03-31 NOTE — H&P ADULT - NSHPPHYSICALEXAM_GEN_ALL_CORE
Vital Signs Last 24 Hrs  T(C): 38.9 (30 Mar 2020 17:00), Max: 38.9 (30 Mar 2020 17:00)  T(F): 102 (30 Mar 2020 17:00), Max: 102 (30 Mar 2020 17:00)  HR: 115 (30 Mar 2020 17:00) (115 - 115)  BP: 132/80 (30 Mar 2020 17:00) (132/80 - 132/80)  BP(mean): --  RR: 19 (30 Mar 2020 17:00) (19 - 19)  SpO2: 96% (30 Mar 2020 17:00) (96% - 96%)

## 2020-03-31 NOTE — H&P ADULT - NSICDXPASTMEDICALHX_GEN_ALL_CORE_FT
PAST MEDICAL HISTORY:  Anemia     Dementia     Depression     Diabetes mellitus     HTN (hypertension)     Osteoarthritis     Osteoporosis     Seizure

## 2020-03-31 NOTE — GOALS OF CARE CONVERSATION - ADVANCED CARE PLANNING - TREATMENT GUIDELINE COMMENT
Continue Medical Mgt.  Should the patient's condition decline the NOKS requested comfort measures. DNR/DNI/No Peg

## 2020-03-31 NOTE — GOALS OF CARE CONVERSATION - ADVANCED CARE PLANNING - CONVERSATION DETAILS
PC NP, PC  and patient's niece Viviana Moon spoke about the patient's current clinical status and goals of care.  NP provided a medical update and niece stated the patient has been non verbal, bed bound and completely dependent upon his caregivers for activities of daily living.  Patient has advanced dementia and is currently on a NRB.  He has been residing at [facility], as per his niece.   was educated regarding the risks versus benefits of CPR and intubation.   requested the patient be DNR/DNI/No Peg, continue current medical mgt. and should the patient's condition decline transition to comfort measures only. Emotional support was provided.

## 2020-03-31 NOTE — H&P ADULT - HISTORY OF PRESENT ILLNESS
83 y/o male from [facility] with PMHx of dementia, COPD, HTN, PVD, DM and seizures is sent to the ED for altered mental status, cough and fever. Patient is nonverbal at present and unable to provide any collateral history. As per prior charts, patient has always had poor mental status and was previously documented to be AAOx1. Patient is in no distress.    GOC: No clear advanced directive noted in chart. This will need to be addressed. 83 y/o male from [facility] with PMHx of dementia, COPD, HTN, PVD and seizures is sent to the ED for altered mental status, cough and fever. Patient is nonverbal at present and unable to provide any collateral history. As per prior charts, patient has always had poor mental status and was previously documented to be AAOx1. Patient is in no distress.    GOC: No clear advanced directive noted in chart. This will need to be addressed.

## 2020-03-31 NOTE — ED ADULT NURSE NOTE - BREATHING, MLM
START Cefzil TWICE DAILY x 10 DAYS    For cough, runny nose, or nasal congestion:  Children's Dimetapp (or Triaminic) Cold and Cough -- 5 ml every in the morning and bedtime, as needed    RECHECK in office here or with ENT in 7-14 DAYS         Ear Infections (Otitis Media) in Children: Care Instructions  Your Care Instructions    An ear infection is an infection behind the eardrum. The most frequent kind of ear infection in children is called otitis media. It usually starts with a cold. Ear infections can hurt a lot. Children with ear infections often fuss and cry, pull at their ears, and sleep poorly. Older children will often tell you that their ear hurts. Most children will have at least one ear infection. Fortunately, children usually outgrow them, often about the time they enter grade school. Your doctor may prescribe antibiotics to treat ear infections. Antibiotics aren't always needed, especially in older children who aren't very sick. Your doctor will discuss treatment with you based on your child and his or her symptoms. Regular doses of pain medicine are the best way to reduce fever and help your child feel better. Follow-up care is a key part of your child's treatment and safety. Be sure to make and go to all appointments, and call your doctor if your child is having problems. It's also a good idea to know your child's test results and keep a list of the medicines your child takes. How can you care for your child at home? · Give your child acetaminophen (Tylenol) or ibuprofen (Advil, Motrin) for fever, pain, or fussiness. Be safe with medicines. Read and follow all instructions on the label. Do not give aspirin to anyone younger than 20. It has been linked to Reye syndrome, a serious illness. · If the doctor prescribed antibiotics for your child, give them as directed. Do not stop using them just because your child feels better. Your child needs to take the full course of antibiotics.   · Place a warm washcloth on your child's ear for pain. · Encourage rest. Resting will help the body fight the infection. Arrange for quiet play activities. When should you call for help? Call 911 anytime you think your child may need emergency care. For example, call if:  · Your child is confused, does not know where he or she is, or is extremely sleepy or hard to wake up. Call your doctor now or seek immediate medical care if:  · Your child seems to be getting much sicker. · Your child has a new or higher fever. · Your child's ear pain is getting worse. · Your child has redness or swelling around or behind the ear. Watch closely for changes in your child's health, and be sure to contact your doctor if:  · Your child has new or worse discharge from the ear. · Your child is not getting better after 2 days (48 hours). · Your child has any new symptoms, such as hearing problems after the ear infection has cleared. Where can you learn more? Go to http://cisco-farhat.info/. Enter ([phone] [phone] in the search box to learn more about \"Ear Infections (Otitis Media) in Children: Care Instructions. \"  Current as of: July 29, 2016  Content Version: 11.3  © 6886-4121 [employer]. Care instructions adapted under license by [employer] (which disclaims liability or warranty for this information). If you have questions about a medical condition or this instruction, always ask your healthcare professional. [place] [address] any warranty or liability for your use of this information. Spontaneous, unlabored and symmetrical

## 2020-03-31 NOTE — H&P ADULT - PROBLEM SELECTOR PLAN 1
O2 sat 96% on 4L NC  CXR shows no overt infiltrates  T 102 in ED  will r/o COVID-19  f/u flu, RVP and COVID-19 PCR  O2 support as needed  f/u blood cx

## 2020-03-31 NOTE — CHART NOTE - NSCHARTNOTEFT_GEN_A_CORE
Palliative Care;    Along with the PC SW spoke with the patient's niece Viviana Moon discussed his clinical status and goals of care.  Given he has advanced dementia, BB, and non verbal, now on NRB to r/o COVID 19 discussed the risks vs benefits of CPR and intubation.  Ms. Moon expressed speaking with her mother who is agreeable for DNr/DNI/no PEG. MOLST drafted per family's wishes.    They would like continued medical management; however should his condition decline they are amenable for comfort care.   Pt to be transferred to [facility].

## 2020-04-01 LAB
CRP SERPL-MCNC: 24.31 MG/DL — HIGH (ref 0–0.4)
FERRITIN SERPL-MCNC: 1223 NG/ML — HIGH (ref 30–400)
FOLATE SERPL-MCNC: >20 NG/ML — SIGNIFICANT CHANGE UP
PROCALCITONIN SERPL-MCNC: 0.13 NG/ML — HIGH (ref 0.02–0.1)
SARS-COV-2 RNA SPEC QL NAA+PROBE: (no result)
VIT B12 SERPL-MCNC: >2000 PG/ML — HIGH (ref 232–1245)

## 2020-04-02 LAB — G6PD RBC-CCNC: 12.1 U/G HGB — SIGNIFICANT CHANGE UP (ref 7–20.5)

## 2020-04-30 PROCEDURE — 83036 HEMOGLOBIN GLYCOSYLATED A1C: CPT

## 2020-04-30 PROCEDURE — 82746 ASSAY OF FOLIC ACID SERUM: CPT

## 2020-04-30 PROCEDURE — 85379 FIBRIN DEGRADATION QUANT: CPT

## 2020-04-30 PROCEDURE — 83615 LACTATE (LD) (LDH) ENZYME: CPT

## 2020-04-30 PROCEDURE — 85027 COMPLETE CBC AUTOMATED: CPT

## 2020-04-30 PROCEDURE — 84145 PROCALCITONIN (PCT): CPT

## 2020-04-30 PROCEDURE — 83735 ASSAY OF MAGNESIUM: CPT

## 2020-04-30 PROCEDURE — 71045 X-RAY EXAM CHEST 1 VIEW: CPT

## 2020-04-30 PROCEDURE — 84100 ASSAY OF PHOSPHORUS: CPT

## 2020-04-30 PROCEDURE — 84443 ASSAY THYROID STIM HORMONE: CPT

## 2020-04-30 PROCEDURE — 85730 THROMBOPLASTIN TIME PARTIAL: CPT

## 2020-04-30 PROCEDURE — 82550 ASSAY OF CK (CPK): CPT

## 2020-04-30 PROCEDURE — 83605 ASSAY OF LACTIC ACID: CPT

## 2020-04-30 PROCEDURE — 80053 COMPREHEN METABOLIC PANEL: CPT

## 2020-04-30 PROCEDURE — 82728 ASSAY OF FERRITIN: CPT

## 2020-04-30 PROCEDURE — 99285 EMERGENCY DEPT VISIT HI MDM: CPT

## 2020-04-30 PROCEDURE — 85610 PROTHROMBIN TIME: CPT

## 2020-04-30 PROCEDURE — 80061 LIPID PANEL: CPT

## 2020-04-30 PROCEDURE — 84484 ASSAY OF TROPONIN QUANT: CPT

## 2020-04-30 PROCEDURE — 86357 NK CELLS TOTAL COUNT: CPT

## 2020-04-30 PROCEDURE — 82607 VITAMIN B-12: CPT

## 2020-04-30 PROCEDURE — 86140 C-REACTIVE PROTEIN: CPT

## 2020-04-30 PROCEDURE — 87635 SARS-COV-2 COVID-19 AMP PRB: CPT

## 2020-04-30 PROCEDURE — 82955 ASSAY OF G6PD ENZYME: CPT

## 2020-04-30 PROCEDURE — 36415 COLL VENOUS BLD VENIPUNCTURE: CPT

## 2020-04-30 PROCEDURE — 86355 B CELLS TOTAL COUNT: CPT

## 2020-07-06 NOTE — PHYSICAL THERAPY INITIAL EVALUATION ADULT - IMPAIRMENTS FOUND, PT EVAL
SURVEY:    You may be receiving a survey from [employer] regarding your visit today. Please complete the survey to enable us to provide the highest quality of care to you and your family. If you cannot score us a very good on any question, please call the office to discuss how we could have made your experience a very good one. Thank you.
gait, locomotion, and balance/aerobic capacity/endurance

## 2020-12-31 ENCOUNTER — EMERGENCY (EMERGENCY)
Facility: HOSPITAL | Age: 85
LOS: 1 days | Discharge: ANOTHER HOSPITAL | End: 2020-12-31
Attending: EMERGENCY MEDICINE
Payer: MEDICARE

## 2020-12-31 ENCOUNTER — INPATIENT (INPATIENT)
Facility: HOSPITAL | Age: 85
LOS: 3 days | Discharge: NURSING, REHAB OR LONG-TERM CARE | DRG: 83 | End: 2021-01-04
Attending: STUDENT IN AN ORGANIZED HEALTH CARE EDUCATION/TRAINING PROGRAM | Admitting: INTERNAL MEDICINE
Payer: MEDICARE

## 2020-12-31 VITALS
HEIGHT: 65 IN | SYSTOLIC BLOOD PRESSURE: 141 MMHG | HEART RATE: 90 BPM | OXYGEN SATURATION: 96 % | DIASTOLIC BLOOD PRESSURE: 80 MMHG | WEIGHT: 139.99 LBS | RESPIRATION RATE: 17 BRPM | TEMPERATURE: 98 F

## 2020-12-31 VITALS
TEMPERATURE: 99 F | HEART RATE: 94 BPM | SYSTOLIC BLOOD PRESSURE: 129 MMHG | OXYGEN SATURATION: 95 % | DIASTOLIC BLOOD PRESSURE: 76 MMHG | RESPIRATION RATE: 18 BRPM

## 2020-12-31 VITALS
DIASTOLIC BLOOD PRESSURE: 80 MMHG | OXYGEN SATURATION: 96 % | TEMPERATURE: 99 F | HEART RATE: 94 BPM | SYSTOLIC BLOOD PRESSURE: 155 MMHG | HEIGHT: 68 IN | WEIGHT: 154.98 LBS | RESPIRATION RATE: 18 BRPM

## 2020-12-31 LAB
ALBUMIN SERPL ELPH-MCNC: 3.6 G/DL — SIGNIFICANT CHANGE UP (ref 3.5–5)
ALBUMIN SERPL ELPH-MCNC: 4.3 G/DL — SIGNIFICANT CHANGE UP (ref 3.3–5)
ALP SERPL-CCNC: 63 U/L — SIGNIFICANT CHANGE UP (ref 40–120)
ALP SERPL-CCNC: 68 U/L — SIGNIFICANT CHANGE UP (ref 40–120)
ALT FLD-CCNC: 16 U/L DA — SIGNIFICANT CHANGE UP (ref 10–60)
ALT FLD-CCNC: 9 U/L — LOW (ref 10–45)
ANION GAP SERPL CALC-SCNC: 10 MMOL/L — SIGNIFICANT CHANGE UP (ref 5–17)
ANION GAP SERPL CALC-SCNC: 8 MMOL/L — SIGNIFICANT CHANGE UP (ref 5–17)
ANISOCYTOSIS BLD QL: SLIGHT — SIGNIFICANT CHANGE UP
APTT BLD: 27.7 SEC — SIGNIFICANT CHANGE UP (ref 27.5–35.5)
APTT BLD: 28.2 SEC — SIGNIFICANT CHANGE UP (ref 27.5–35.5)
AST SERPL-CCNC: 16 U/L — SIGNIFICANT CHANGE UP (ref 10–40)
AST SERPL-CCNC: 17 U/L — SIGNIFICANT CHANGE UP (ref 10–40)
BASOPHILS # BLD AUTO: 0.05 K/UL — SIGNIFICANT CHANGE UP (ref 0–0.2)
BASOPHILS # BLD AUTO: 0.05 K/UL — SIGNIFICANT CHANGE UP (ref 0–0.2)
BASOPHILS NFR BLD AUTO: 0.9 % — SIGNIFICANT CHANGE UP (ref 0–2)
BASOPHILS NFR BLD AUTO: 1.1 % — SIGNIFICANT CHANGE UP (ref 0–2)
BILIRUB SERPL-MCNC: 0.3 MG/DL — SIGNIFICANT CHANGE UP (ref 0.2–1.2)
BILIRUB SERPL-MCNC: 0.3 MG/DL — SIGNIFICANT CHANGE UP (ref 0.2–1.2)
BLD GP AB SCN SERPL QL: NEGATIVE — SIGNIFICANT CHANGE UP
BLD GP AB SCN SERPL QL: SIGNIFICANT CHANGE UP
BUN SERPL-MCNC: 31 MG/DL — HIGH (ref 7–23)
BUN SERPL-MCNC: 32 MG/DL — HIGH (ref 7–18)
CALCIUM SERPL-MCNC: 9.2 MG/DL — SIGNIFICANT CHANGE UP (ref 8.4–10.5)
CALCIUM SERPL-MCNC: 9.7 MG/DL — SIGNIFICANT CHANGE UP (ref 8.4–10.5)
CHLORIDE SERPL-SCNC: 101 MMOL/L — SIGNIFICANT CHANGE UP (ref 96–108)
CHLORIDE SERPL-SCNC: 104 MMOL/L — SIGNIFICANT CHANGE UP (ref 96–108)
CK MB CFR SERPL CALC: 1.1 NG/ML — SIGNIFICANT CHANGE UP (ref 0–6.7)
CK SERPL-CCNC: 70 U/L — SIGNIFICANT CHANGE UP (ref 30–200)
CO2 SERPL-SCNC: 24 MMOL/L — SIGNIFICANT CHANGE UP (ref 22–31)
CO2 SERPL-SCNC: 27 MMOL/L — SIGNIFICANT CHANGE UP (ref 22–31)
CREAT SERPL-MCNC: 1.24 MG/DL — SIGNIFICANT CHANGE UP (ref 0.5–1.3)
CREAT SERPL-MCNC: 1.34 MG/DL — HIGH (ref 0.5–1.3)
EOSINOPHIL # BLD AUTO: 0.25 K/UL — SIGNIFICANT CHANGE UP (ref 0–0.5)
EOSINOPHIL # BLD AUTO: 0.27 K/UL — SIGNIFICANT CHANGE UP (ref 0–0.5)
EOSINOPHIL NFR BLD AUTO: 5.1 % — SIGNIFICANT CHANGE UP (ref 0–6)
EOSINOPHIL NFR BLD AUTO: 5.4 % — SIGNIFICANT CHANGE UP (ref 0–6)
GLUCOSE SERPL-MCNC: 106 MG/DL — HIGH (ref 70–99)
GLUCOSE SERPL-MCNC: 115 MG/DL — HIGH (ref 70–99)
HCT VFR BLD CALC: 30.5 % — LOW (ref 39–50)
HCT VFR BLD CALC: 32.6 % — LOW (ref 39–50)
HGB BLD-MCNC: 10 G/DL — LOW (ref 13–17)
HGB BLD-MCNC: 10.3 G/DL — LOW (ref 13–17)
IMM GRANULOCYTES NFR BLD AUTO: 2.6 % — HIGH (ref 0–1.5)
INR BLD: 1.12 RATIO — SIGNIFICANT CHANGE UP (ref 0.88–1.16)
INR BLD: 1.17 RATIO — HIGH (ref 0.88–1.16)
LYMPHOCYTES # BLD AUTO: 1.32 K/UL — SIGNIFICANT CHANGE UP (ref 1–3.3)
LYMPHOCYTES # BLD AUTO: 1.69 K/UL — SIGNIFICANT CHANGE UP (ref 1–3.3)
LYMPHOCYTES # BLD AUTO: 28.8 % — SIGNIFICANT CHANGE UP (ref 13–44)
LYMPHOCYTES # BLD AUTO: 31.6 % — SIGNIFICANT CHANGE UP (ref 13–44)
MACROCYTES BLD QL: SIGNIFICANT CHANGE UP
MANUAL SMEAR VERIFICATION: SIGNIFICANT CHANGE UP
MCHC RBC-ENTMCNC: 31.6 GM/DL — LOW (ref 32–36)
MCHC RBC-ENTMCNC: 32.8 GM/DL — SIGNIFICANT CHANGE UP (ref 32–36)
MCHC RBC-ENTMCNC: 34 PG — SIGNIFICANT CHANGE UP (ref 27–34)
MCHC RBC-ENTMCNC: 35 PG — HIGH (ref 27–34)
MCV RBC AUTO: 106.6 FL — HIGH (ref 80–100)
MCV RBC AUTO: 107.6 FL — HIGH (ref 80–100)
MONOCYTES # BLD AUTO: 0.46 K/UL — SIGNIFICANT CHANGE UP (ref 0–0.9)
MONOCYTES # BLD AUTO: 1.2 K/UL — HIGH (ref 0–0.9)
MONOCYTES NFR BLD AUTO: 26.1 % — HIGH (ref 2–14)
MONOCYTES NFR BLD AUTO: 8.6 % — SIGNIFICANT CHANGE UP (ref 2–14)
NEUTROPHILS # BLD AUTO: 1.65 K/UL — LOW (ref 1.8–7.4)
NEUTROPHILS # BLD AUTO: 2.51 K/UL — SIGNIFICANT CHANGE UP (ref 1.8–7.4)
NEUTROPHILS NFR BLD AUTO: 36 % — LOW (ref 43–77)
NEUTROPHILS NFR BLD AUTO: 47 % — SIGNIFICANT CHANGE UP (ref 43–77)
NRBC # BLD: 0 /100 WBCS — SIGNIFICANT CHANGE UP (ref 0–0)
NT-PROBNP SERPL-SCNC: 175 PG/ML — SIGNIFICANT CHANGE UP (ref 0–300)
OVALOCYTES BLD QL SMEAR: SLIGHT — SIGNIFICANT CHANGE UP
PLAT MORPH BLD: NORMAL — SIGNIFICANT CHANGE UP
PLATELET # BLD AUTO: 134 K/UL — LOW (ref 150–400)
PLATELET # BLD AUTO: 140 K/UL — LOW (ref 150–400)
POTASSIUM SERPL-MCNC: 4.2 MMOL/L — SIGNIFICANT CHANGE UP (ref 3.5–5.3)
POTASSIUM SERPL-MCNC: 4.7 MMOL/L — SIGNIFICANT CHANGE UP (ref 3.5–5.3)
POTASSIUM SERPL-SCNC: 4.2 MMOL/L — SIGNIFICANT CHANGE UP (ref 3.5–5.3)
POTASSIUM SERPL-SCNC: 4.7 MMOL/L — SIGNIFICANT CHANGE UP (ref 3.5–5.3)
PROT SERPL-MCNC: 7.1 G/DL — SIGNIFICANT CHANGE UP (ref 6–8.3)
PROT SERPL-MCNC: 7.9 G/DL — SIGNIFICANT CHANGE UP (ref 6–8.3)
PROTHROM AB SERPL-ACNC: 13.2 SEC — SIGNIFICANT CHANGE UP (ref 10.6–13.6)
PROTHROM AB SERPL-ACNC: 13.9 SEC — HIGH (ref 10.6–13.6)
RBC # BLD: 2.86 M/UL — LOW (ref 4.2–5.8)
RBC # BLD: 3.03 M/UL — LOW (ref 4.2–5.8)
RBC # FLD: 13.2 % — SIGNIFICANT CHANGE UP (ref 10.3–14.5)
RBC # FLD: 13.5 % — SIGNIFICANT CHANGE UP (ref 10.3–14.5)
RBC BLD AUTO: ABNORMAL
RH IG SCN BLD-IMP: POSITIVE — SIGNIFICANT CHANGE UP
SARS-COV-2 RNA SPEC QL NAA+PROBE: SIGNIFICANT CHANGE UP
SCHISTOCYTES BLD QL AUTO: SLIGHT — SIGNIFICANT CHANGE UP
SODIUM SERPL-SCNC: 136 MMOL/L — SIGNIFICANT CHANGE UP (ref 135–145)
SODIUM SERPL-SCNC: 138 MMOL/L — SIGNIFICANT CHANGE UP (ref 135–145)
TROPONIN I SERPL-MCNC: <0.015 NG/ML — SIGNIFICANT CHANGE UP (ref 0–0.04)
TROPONIN T, HIGH SENSITIVITY RESULT: 20 NG/L — SIGNIFICANT CHANGE UP (ref 0–51)
VARIANT LYMPHS # BLD: 6.8 % — HIGH (ref 0–6)
WBC # BLD: 4.59 K/UL — SIGNIFICANT CHANGE UP (ref 3.8–10.5)
WBC # BLD: 5.35 K/UL — SIGNIFICANT CHANGE UP (ref 3.8–10.5)
WBC # FLD AUTO: 4.59 K/UL — SIGNIFICANT CHANGE UP (ref 3.8–10.5)
WBC # FLD AUTO: 5.35 K/UL — SIGNIFICANT CHANGE UP (ref 3.8–10.5)

## 2020-12-31 PROCEDURE — 71045 X-RAY EXAM CHEST 1 VIEW: CPT

## 2020-12-31 PROCEDURE — 72125 CT NECK SPINE W/O DYE: CPT | Mod: 26

## 2020-12-31 PROCEDURE — 36415 COLL VENOUS BLD VENIPUNCTURE: CPT

## 2020-12-31 PROCEDURE — 99291 CRITICAL CARE FIRST HOUR: CPT | Mod: 25

## 2020-12-31 PROCEDURE — 86901 BLOOD TYPING SEROLOGIC RH(D): CPT

## 2020-12-31 PROCEDURE — 85610 PROTHROMBIN TIME: CPT

## 2020-12-31 PROCEDURE — 93010 ELECTROCARDIOGRAM REPORT: CPT

## 2020-12-31 PROCEDURE — 86850 RBC ANTIBODY SCREEN: CPT

## 2020-12-31 PROCEDURE — 99285 EMERGENCY DEPT VISIT HI MDM: CPT | Mod: GC

## 2020-12-31 PROCEDURE — 84484 ASSAY OF TROPONIN QUANT: CPT

## 2020-12-31 PROCEDURE — 85025 COMPLETE CBC W/AUTO DIFF WBC: CPT

## 2020-12-31 PROCEDURE — 99291 CRITICAL CARE FIRST HOUR: CPT

## 2020-12-31 PROCEDURE — 80053 COMPREHEN METABOLIC PANEL: CPT

## 2020-12-31 PROCEDURE — 93005 ELECTROCARDIOGRAM TRACING: CPT

## 2020-12-31 PROCEDURE — 99283 EMERGENCY DEPT VISIT LOW MDM: CPT

## 2020-12-31 PROCEDURE — 72125 CT NECK SPINE W/O DYE: CPT

## 2020-12-31 PROCEDURE — 70450 CT HEAD/BRAIN W/O DYE: CPT | Mod: 26

## 2020-12-31 PROCEDURE — 87635 SARS-COV-2 COVID-19 AMP PRB: CPT

## 2020-12-31 PROCEDURE — 86900 BLOOD TYPING SEROLOGIC ABO: CPT

## 2020-12-31 PROCEDURE — 71045 X-RAY EXAM CHEST 1 VIEW: CPT | Mod: 26

## 2020-12-31 PROCEDURE — 85730 THROMBOPLASTIN TIME PARTIAL: CPT

## 2020-12-31 PROCEDURE — 70450 CT HEAD/BRAIN W/O DYE: CPT

## 2020-12-31 RX ORDER — TETANUS TOXOID, REDUCED DIPHTHERIA TOXOID AND ACELLULAR PERTUSSIS VACCINE, ADSORBED 5; 2.5; 8; 8; 2.5 [IU]/.5ML; [IU]/.5ML; UG/.5ML; UG/.5ML; UG/.5ML
0.5 SUSPENSION INTRAMUSCULAR ONCE
Refills: 0 | Status: COMPLETED | OUTPATIENT
Start: 2020-12-31 | End: 2020-12-31

## 2020-12-31 RX ADMIN — TETANUS TOXOID, REDUCED DIPHTHERIA TOXOID AND ACELLULAR PERTUSSIS VACCINE, ADSORBED 0.5 MILLILITER(S): 5; 2.5; 8; 8; 2.5 SUSPENSION INTRAMUSCULAR at 22:32

## 2020-12-31 NOTE — CONSULT NOTE ADULT - SUBJECTIVE AND OBJECTIVE BOX
TRAUMA SERVICE (Acute Care Surgery / ACS - #9092) - CONSULT NOTE  --------------------------------------------------------------------------------------------    MECHANISM OF INJURY:      [] Blunt  	[] MVC	[x] Fall	[] Pedestrian Struck	[] Motorcycle accident      [] Penetrating  	[] Gun Shot Wound 		[] Stab Wound    GCS: 	E: 4	V: 3	M: 6 (verbal: at baseline)      HPI:   Patient is a 85y old  Male who presents with a chief complaint of being found down    HPI:  Mr. Yang is a Citizen of [place] speaking 85 year old gentleman with a PMH of HTN, seizures, anxiety, COPD, DM, dementia (basleine A&Ox1 per chart) who was BIB EMS from [facility] to [place] after he was found down in his room with a 5cm laceration to the posterior skull. The presumed fall was unwitnessed and it is unknown how long he was down for. When he was found down he was reportedly moving all extremities spontaneously. His vital signs were stable, labs showed no leukocytosis, H&H 10/30 (same as prior), and CMP significant for elevated Cr. 1.4. CT head showed a small focus of subarachnoid hemorrhage within the right anterior frontal lobe and  a very thin subdural hemorrhage along the interhemispheric fissure. without signs of midline shift or herniation pattern. CT C-spine was negative for acute pathology. Given his intracranial bleed and the fact he is on ASA81, a unit of platelets was transfused and he was transferred to [facility].    Here in the ED he has remained HD stable, saturating 97% on RA, and labs are pending. CT head to be repeated at the four hour interval.        PAST MEDICAL & SURGICAL HISTORY:  HTN  COPD  DM  anxiety  Seizures  Dementia    FAMILY HISTORY:      SOCIAL HISTORY:   Lives in Nursing home, per chart bedbound and nonverbal but is awake, alert, and follows commands in Citizen of [place]    ALLERGIES: No Known Allergies      HOME MEDICATIONS:   ASA81 daily  Folic Acid 1mg daily  Keppra 500 BID  Paroxetine 10mg daily  Vit D3 1000 daily  Ferrous Sulfate 325 BID  Multivitamin daily  B12 1000mcg daily      CURRENT MEDICATIONS  MEDICATIONS (STANDING):   MEDICATIONS (PRN):  --------------------------------------------------------------------------------------------    Vitals:   T(C): 37.1 (12-31-20 @ 22:03), Max: 37.1 (12-31-20 @ 22:03)  HR: 89 (12-31-20 @ 22:18) (89 - 94)  BP: 138/81 (12-31-20 @ 22:18) (138/81 - 155/80)  RR: 22 (12-31-20 @ 22:18) (18 - 22)  SpO2: 97% (12-31-20 @ 22:18) (96% - 97%)  CAPILLARY BLOOD GLUCOSE        CAPILLARY BLOOD GLUCOSE          Height (cm): 172.7 (12-31 @ 22:03)  Weight (kg): 70.3 (12-31 @ 22:03)  BMI (kg/m2): 23.6 (12-31 @ 22:03)  BSA (m2): 1.83 (12-31 @ 22:03)    PHYSICAL EXAM:   General: NAD  HEENT: 4-5 cm laceration to the posterior midline scalp with minimal dried blood, stapled in FH, EOMI  Neck: Soft, midline trachea no tenderness  Chest: No chest wall tenderness.   Cardiac: S1, S2, RRR  Respiratory: Symmetric chest rise, no increased WOB  Abdomen: Soft, non-distended, non-tender , no rebound, no guarding  Pelvis: Stable, non-tender, no ecchymosis  Ext: palp radial b/l UE, b/l DP palp in Lower Extrem.   Back: no TTP, no palpable runoff/stepoff/deformity    --------------------------------------------------------------------------------------------    LABS    BMP (12-31 @ 22:23)             138     |  101     |  31<H> 		Ca++ --      Ca 9.7                ---------------------------------( 115<H>		Mg --                 4.2     |  27      |  1.24  			Ph --        LFTs (12-31 @ 22:23)      TPro 7.1 / Alb 4.3 / TBili 0.3 / DBili -- / AST 17 / ALT 9<L> / AlkPhos 63    Coags (12-31 @ 22:23)  aPTT 28.2 / INR 1.17<H> / PT 13.9<H>    Cardiac Markers (12-31 @ 22:23)     HSTrop: -- / CKMB: -- / CK: 70        --------------------------------------------------------------------------------------------    IMAGING  < from: Xray Chest 1 View AP/PA (12.31.20 @ 22:41) >    FINDINGS:  The heart size is normal.    The lungs are clear. No pleural effusion or pneumothorax.    Degenerative changes of the spine.    IMPRESSION:  Clear lungs.    < end of copied text >    CT Head  There is a small focus of subarachnoid hemorrhage within the right anterior frontal lobe and a small amount of subarachnoid hemorrhage in the quadrigeminal plate cistern and left ambient cistern. There is a very thin subdural hemorrhage along the interhemispheric fissure.    There is extensive encephalomalacia/gliosis within the anterior frontal lobes, left greater than right, likely reflecting sequela of old trauma.    The ventricles and sulci are dilated, compatible with severe generalized cerebral volume loss.   There is no CT evidence for acute cerebral cortical infarct.  There is periventricular and subcortical white matter hypoattenuation,  most compatible with chronic microvascular ischemic changes.   No mass effect is found in the brain.  There is no midline shift or herniation pattern.      The visualized portions of the paranasal sinuses and mastoid air cells are clear.    IMPRESSION:    Small focus of subarachnoid hemorrhage within the right anterior frontal lobe and a small amount of subarachnoid hemorrhage in the quadrigeminal plate cistern and left ambient cistern. There is a very thin subdural hemorrhage along the interhemispheric fissure. No mass effect midline shift or herniation pattern.    Rest of the chronic changes as above.    CT c-spine  FINDINGS:   No prior similar studies are available for review    Cervical vertebral body heights are maintained. No acute vertebral fracture is seen. Mild chronic appearing compression fracture of T3..  Alignment is preserved.  Facet joints appear intact and aligned.    Cervical intervertebral disc spaces show multilevel degenerative disease and spondylosis with loss of intervertebral disc height and associated degenerative endplate changes.  There is multilevel narrowing of the neural foramina on a degenerative basis.  There is no evidence of high-grade central canal stenosis.   MR would be required to evaluate the intervertebral discs at higher sensitivity for disc pathology.    The skull base appears intact.  No neck mass is recognized.  Paraspinal soft tissues appear intact. Visualized lymph nodes appear to be within physiologic size limits.      IMPRESSION:   No vertebral fracture is recognized.  Multilevel degenerative changes of the cervical spine are present.          --------------------------------------------------------------------------------------------         TRAUMA SERVICE (Acute Care Surgery / ACS - #9072) - CONSULT NOTE  --------------------------------------------------------------------------------------------    MECHANISM OF INJURY:      [] Blunt  	[] MVC	[x] Fall	[] Pedestrian Struck	[] Motorcycle accident      [] Penetrating  	[] Gun Shot Wound 		[] Stab Wound    GCS: 	E: 4	V: 3	M: 6 (verbal: at baseline)      HPI:   Patient is a 85y old  Male who presents with a chief complaint of being found down    HPI:  Mr. Yang is a [place] speaking 85 year old gentleman with a PMH of HTN, seizures, anxiety, COPD, DM, dementia (basleine A&Ox1 per chart) who was BIB EMS from [facility] to [place] after he was found down in his room with a 5cm laceration to the posterior skull. The presumed fall was unwitnessed and it is unknown how long he was down for. When he was found down he was reportedly moving all extremities spontaneously. His vital signs were stable, labs showed no leukocytosis, H&H 10/30 (same as prior), and CMP significant for elevated Cr. 1.4. CT head showed a small focus of subarachnoid hemorrhage within the right anterior frontal lobe and  a very thin subdural hemorrhage along the interhemispheric fissure. without signs of midline shift or herniation pattern. CT C-spine was negative for acute pathology. Given his intracranial bleed and the fact he is on ASA81, a unit of platelets was transfused and he was transferred to [facility].    Here in the ED he has remained HD stable, saturating 97% on RA, and labs are pending. CT head to be repeated at the four hour interval.        PAST MEDICAL & SURGICAL HISTORY:  HTN  COPD  DM  anxiety  Seizures  Dementia    FAMILY HISTORY:  Patient unable to participate due to dementia    SOCIAL HISTORY:   Lives in Nursing home, per chart bedbound and nonverbal but is awake, alert, and follows commands in [place]    ALLERGIES: No Known Allergies      HOME MEDICATIONS:   ASA81 daily  Folic Acid 1mg daily  Keppra 500 BID  Paroxetine 10mg daily  Vit D3 1000 daily  Ferrous Sulfate 325 BID  Multivitamin daily  B12 1000mcg daily      CURRENT MEDICATIONS  MEDICATIONS (STANDING):   MEDICATIONS (PRN):  --------------------------------------------------------------------------------------------    Vitals:   T(C): 37.1 (12-31-20 @ 22:03), Max: 37.1 (12-31-20 @ 22:03)  HR: 89 (12-31-20 @ 22:18) (89 - 94)  BP: 138/81 (12-31-20 @ 22:18) (138/81 - 155/80)  RR: 22 (12-31-20 @ 22:18) (18 - 22)  SpO2: 97% (12-31-20 @ 22:18) (96% - 97%)  CAPILLARY BLOOD GLUCOSE        CAPILLARY BLOOD GLUCOSE          Height (cm): 172.7 (12-31 @ 22:03)  Weight (kg): 70.3 (12-31 @ 22:03)  BMI (kg/m2): 23.6 (12-31 @ 22:03)  BSA (m2): 1.83 (12-31 @ 22:03)    PHYSICAL EXAM:   General: NAD  HEENT: 4-5 cm laceration to the posterior midline scalp with minimal dried blood, stapled in FH, EOMI  Neck: Soft, midline trachea no tenderness  Chest: No chest wall tenderness.   Cardiac: S1, S2, RRR  Respiratory: Symmetric chest rise, no increased WOB  Abdomen: Soft, non-distended, non-tender , no rebound, no guarding  Pelvis: Stable, non-tender, no ecchymosis  Ext: palp radial b/l UE, b/l DP palp in Lower Extrem.   Back: no TTP, no palpable runoff/stepoff/deformity    --------------------------------------------------------------------------------------------    LABS    BMP (12-31 @ 22:23)             138     |  101     |  31<H> 		Ca++ --      Ca 9.7                ---------------------------------( 115<H>		Mg --                 4.2     |  27      |  1.24  			Ph --        LFTs (12-31 @ 22:23)      TPro 7.1 / Alb 4.3 / TBili 0.3 / DBili -- / AST 17 / ALT 9<L> / AlkPhos 63    Coags (12-31 @ 22:23)  aPTT 28.2 / INR 1.17<H> / PT 13.9<H>    Cardiac Markers (12-31 @ 22:23)     HSTrop: -- / CKMB: -- / CK: 70        --------------------------------------------------------------------------------------------    IMAGING  < from: Xray Chest 1 View AP/PA (12.31.20 @ 22:41) >    FINDINGS:  The heart size is normal.    The lungs are clear. No pleural effusion or pneumothorax.    Degenerative changes of the spine.    IMPRESSION:  Clear lungs.    < end of copied text >    CT Head  There is a small focus of subarachnoid hemorrhage within the right anterior frontal lobe and a small amount of subarachnoid hemorrhage in the quadrigeminal plate cistern and left ambient cistern. There is a very thin subdural hemorrhage along the interhemispheric fissure.    There is extensive encephalomalacia/gliosis within the anterior frontal lobes, left greater than right, likely reflecting sequela of old trauma.    The ventricles and sulci are dilated, compatible with severe generalized cerebral volume loss.   There is no CT evidence for acute cerebral cortical infarct.  There is periventricular and subcortical white matter hypoattenuation,  most compatible with chronic microvascular ischemic changes.   No mass effect is found in the brain.  There is no midline shift or herniation pattern.      The visualized portions of the paranasal sinuses and mastoid air cells are clear.    IMPRESSION:    Small focus of subarachnoid hemorrhage within the right anterior frontal lobe and a small amount of subarachnoid hemorrhage in the quadrigeminal plate cistern and left ambient cistern. There is a very thin subdural hemorrhage along the interhemispheric fissure. No mass effect midline shift or herniation pattern.    Rest of the chronic changes as above.    CT c-spine  FINDINGS:   No prior similar studies are available for review    Cervical vertebral body heights are maintained. No acute vertebral fracture is seen. Mild chronic appearing compression fracture of T3..  Alignment is preserved.  Facet joints appear intact and aligned.    Cervical intervertebral disc spaces show multilevel degenerative disease and spondylosis with loss of intervertebral disc height and associated degenerative endplate changes.  There is multilevel narrowing of the neural foramina on a degenerative basis.  There is no evidence of high-grade central canal stenosis.   MR would be required to evaluate the intervertebral discs at higher sensitivity for disc pathology.    The skull base appears intact.  No neck mass is recognized.  Paraspinal soft tissues appear intact. Visualized lymph nodes appear to be within physiologic size limits.      IMPRESSION:   No vertebral fracture is recognized.  Multilevel degenerative changes of the cervical spine are present.      CT Chest, Abdomen, and Pelvis  FINDINGS:  CHEST:  LUNGS: Patent central airways. No pulmonary nodules, masses or consolidation. Biapical scarring. Lingular subsegmental atelectasis. Left upper and lower lobe calcified granuloma.  PLEURA: No pleural effusion.  VESSELS: No central pulmonary embolism. No aortic dissection or evidence of aortic traumatic injury.  HEART: Heart size is normal. No pericardial effusion. Coronary artery calcifications.  MEDIASTINUM AND FUAD: No lymphadenopathy.  CHEST WALL AND LOWER NECK: Within normal limits.  BONES: Old fracture deformity of the left scapula. Age-indeterminate compression fracture deformity of the T3 vertebral body and superior endplates ofthe T8, T9 and T10 vertebral bodies resulting in less than 50% loss of vertebral body height.    ABDOMEN AND PELVIS:  LIVER: Within normal limits.  BILE DUCTS: Normal caliber.  GALLBLADDER: Within normal limits.  SPLEEN: Within normal limits.  PANCREAS: Within normal limits.  ADRENALS: Within normal limits.  KIDNEYS/URETERS: Bilateral renal subcentimeter hypodense lesions which are too small for accurate characterization. No hydronephrosis.    BLADDER: Within normal limits.  REPRODUCTIVE ORGANS: Prostate within normal limits.    BOWEL: Scattered colonic diverticulosis without diverticulitis. No bowel obstruction or inflammation. Appendix is normal. Metal stool throughout the colon.  PERITONEUM: No ascites.  VESSELS: Atherosclerotic changes. 6.2 cm infrarenal abdominal aortic aneurysm. No intra-abdominal hemorrhage.  RETROPERITONEUM/LYMPH NODES: No lymphadenopathy.  ABDOMINAL WALL: Small fat-containing umbilical hernia.  BONES: Degenerative changes of the spine. 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    IMPRESSION:  1.  No evidence of acute intrathoracic, intra-abdominal or intrapelvic trauma.  2.  Infrarenal abdominal aortic aneurysm measuring up to 6.2 cm.  3.  Age indeterminate compression fracture deformities of the T3, T8, T9 and T10 vertebral bodies.    < end of copied text >            --------------------------------------------------------------------------------------------

## 2020-12-31 NOTE — CONSULT NOTE ADULT - ATTENDING COMMENTS
Very small amount of traumatic SAH/SDH, will manage conservatively.
Agree with above. No other traumatic injuries except SAH/SDH. Anti-seizure meds. Hold antiplatelets/anticoagulation. Consider vascular eval for AAA.

## 2020-12-31 NOTE — CONSULT NOTE ADULT - SUBJECTIVE AND OBJECTIVE BOX
p ([id number])     C[place] speaking 85 year old gentleman with a PMH of HTN, seizures, anxiety, COPD, DM, dementia (basleine A&Ox1 per chart) who was BIB EMS from [facility] to [place] after he was found down in his room with a 5cm laceration to the posterior skull. The presumed fall was unwitnessed and it is unknown how long he was down for. When he was found down he was reportedly moving all extremities spontaneously. His vital signs were stable, labs showed no leukocytosis, H&H 10/30 (same as prior), and CMP significant for elevated Cr. 1.4. CT head showed a small focus of subarachnoid hemorrhage within the right anterior frontal lobe and  a very thin subdural hemorrhage along the interhemispheric fissure. without signs of midline shift or herniation pattern. CT C-spine was negative for acute pathology. Given his intracranial bleed and the fact he is on ASA81, a unit of platelets was transfused and he was transferred to [facility].      Imaging:    Exam:  awake, oriented to name (in Czech), intermittently FC / HUGGINS spon AG, denies pain    --Anticoagulation:    =====================  PAST MEDICAL HISTORY     PAST SURGICAL HISTORY         MEDICATIONS:  Antibiotics:    Neuro:    Other:      SOCIAL HISTORY:   Occupation:   Marital Status:     FAMILY HISTORY:      ROS: Negative except per HPI    LABS:  PT/INR - ( 31 Dec 2020 22:23 )   PT: 13.9 sec;   INR: 1.17 ratio         PTT - ( 31 Dec 2020 22:23 )  PTT:28.2 sec                        10.0   5.35  )-----------( 140      ( 31 Dec 2020 22:23 )             30.5     12-31    138  |  101  |  31<H>  ----------------------------<  115<H>  4.2   |  27  |  1.24    Ca    9.7      31 Dec 2020 22:23    TPro  7.1  /  Alb  4.3  /  TBili  0.3  /  DBili  x   /  AST  17  /  ALT  9<L>  /  AlkPhos  63  12-31

## 2020-12-31 NOTE — ED PROVIDER NOTE - CARE PLAN
Principal Discharge DX:	Subdural hematoma  Secondary Diagnosis:	Subarachnoid hematoma  Secondary Diagnosis:	Abdominal aortic aneurysm

## 2020-12-31 NOTE — ED PROVIDER NOTE - CLINICAL SUMMARY MEDICAL DECISION MAKING FREE TEXT BOX
86 yo M with unwitnessed fall from NH. Patient with dementia and unable to provide further hx. Moving all extremities. With 5cm occipital laceration. CT with SAH and SDH. On aspirin and platelets given. Discussed with NS, trauma and recommended goals of care conversation with family. Discussed with family and would like neurosurg evaluation and transfer for possible intervention. Endorsed to ED attending, Dr. Burns at [place].

## 2020-12-31 NOTE — ED ADULT NURSE NOTE - OBJECTIVE STATEMENT
Patient BIBA for head laceration. as per EMS patient sp fall. Patient is alert x1, poor historian, unable to describe what happened. Patient noted to have laceration to back of head 5cm, not actively bleeding. Patient denies pain. Patient unable to communicate through .

## 2020-12-31 NOTE — CONSULT NOTE ADULT - ASSESSMENT
MarionicoleWes  85M ? ASA, DNR, PMHx dementia AAOx1 baseline xfer from  for fall at nursing home. CT shows R frontal tSAH, left ambient cistern heme, and thin interhemispheric SDH. Exam: awake, oriented to name, intermittently FC / HUGGINS spon AG  - No acute neurosurgical intervention, non operative at this time   - Already received platelets at OSH  - plt 140, INR 1.17  - Keppra 500 BID for 7 days  - Repeat 4hr stability CT is stable  - OK for floor w/ q4 hr neurochecks  - Outpatient f/u w/ Dr. Asif after discharge  - Will discuss with neurosurgery attending  - Plan discussed with ED  - GCS 15

## 2020-12-31 NOTE — ED ADULT NURSE NOTE - OBJECTIVE STATEMENT
85 yr old M transfer from [facility] for a + SAH, +SHA. pt had a unwitnessed fall in a nursing home, sustaining a laceration to the posterior head. unknown LOC. no known blood thinners HX dementia (A&o1 baseline), DM, seizures, anxiety.  upon arrival pt is a&ox1 to self. speaking clearly in Thai. pt is moving all extremities. lungs clear adam. respirations are even and unlabored. abd is soft, non tender. staples noted to back of head. pt denies any pain, fever, chills, cough, nausea or vomiting.

## 2020-12-31 NOTE — ED PROVIDER NOTE - ATTENDING CONTRIBUTION TO CARE
Attending MD Maria D Burns:  I personally have seen and examined this patient.  Resident note reviewed and agree on plan of care and except where noted.  See HPI, PE, and MDM for details.

## 2020-12-31 NOTE — ED PROVIDER NOTE - OBJECTIVE STATEMENT
Attending Maria D Burns: 86yo maleh/o demential, seizure disorder. transferred for concern for intracrnial hemorrhage. pt reportedly found on the ground, unwitnessed. went to [facility] and ct scan showed evidence of small SAH and SDH. per chart review pt on aspirin, given 1Unit of platelets and transferred. unclear whether ambulated afterward Attending Maria D Burns: 84yo maleh/o demential, seizure disorder. transferred for concern for intracrnial hemorrhage. pt reportedly found on the ground, unwitnessed. went to [facility] and ct scan showed evidence of small SAH and SDH. per chart review pt on aspirin, given 1Unit of platelets and transferred. unclear whether ambulated afterward. upon arrival pt moving all extremities, denies any pain.

## 2020-12-31 NOTE — ED PROVIDER NOTE - CARDIAC, MLM
Health Maintenance Due   Topic Date Due   • Pneumococcal Vaccine 0-64 (1 of 3 - PCV13) 11/26/1967   • Shingles Vaccine (1 of 2) 11/26/2011   • Influenza Vaccine (1) 09/01/2019       Patient is due for topics as listed above but is not proceeding with Immunization(s) Influenza, Pneumococcal and Shingles at this time.          Normal rate, regular rhythm.  Heart sounds S1, S2.  No murmurs, rubs or gallops.

## 2020-12-31 NOTE — ED PROVIDER NOTE - PHYSICAL EXAMINATION
Attending Maria D Burns: Gen: NAD,hematoma with laceration to posterior occiput that was repaired: , eomi, perrla, mmm, op pink, uvula midline, neck; nttp, no nuchal rigidity, chest: nttp, no crepitus, cv: rrr, +murmur, lungs: ctab, abd: soft, nontender, nondistended, no peritoneal signs, +BS, no guarding, ext: wwp, neg homans, skin: no rash, neuro: awake and alert, following commands, speech clear, sensation and strength intact, no focal deficits

## 2020-12-31 NOTE — ED PROVIDER NOTE - PROGRESS NOTE DETAILS
negative Discussed case with trauma attending at [facility], Dr. Merida. Will accept if patient family would like intervention. Will discuss with family regarding goals of care. MOLST form with DNR/DNI Extension discussion and multiple conversation had with patient family (niece Getachew Augustine and sister Candelaria) would like patient to be transferred for evaluation. Accepted by Dr. Merida. Endorsed to ED attending, Dr. Maria D bui.

## 2020-12-31 NOTE — CONSULT NOTE ADULT - ASSESSMENT
Mr. Yang is a Latvian speaking 85 year old gentleman with a PMH of HTN, seizures, anxiety, COPD, DM, dementia (basleine A&Ox1 per chart) who was BIB EMS from [facility] to [place] after he was found down in his room with a 5cm laceration to the posterior skull and found to have small SAH/SDH.    Plan:  - follow up CT head repeat  - CT chest/abdomen/pelvis since patient cannot disclose symptoms at this time  - if no traumatic pathology identified, dispo per NSG and ED.    Discussed with Dr. Soto  Trauma Surgery  [phone] Mr. Yang is a Arabic speaking 85 year old gentleman with a PMH of HTN, seizures, anxiety, COPD, DM, dementia (basleine A&Ox1 per chart) who was BIB EMS from [facility] to [place] after he was found down in his room with a 5cm laceration to the posterior skull and found to have small SAH/SDH.    Plan:  - CT head stable, NSG recommends 24hr CT  - CT chest/abdomen/pelvis shows no acute pathology, age indeterminate T3/8/9/10 fx, of which at least T3/8/9 were noted in 2016 and 2018  - defer to NSG for management of thoracic fx  - known AAA 6.2cm, up from 5.2 2018, 4.6 2016; can consider GOC vs workup given DNR status  - dispo per NSG/ED    Discussed with Dr. Soto  Trauma Surgery  [phone] Mr. Yang is a Divehi speaking 85 year old gentleman with a PMH of HTN, seizures, anxiety, COPD, DM, dementia (basleine A&Ox1 per chart) who was BIB EMS from [facility] to [place] after he was found down in his room with a 5cm laceration to the posterior skull and found to have small SAH/SDH.    Plan:  - CT head stable, NSG recommends 24hr CT  - CT chest/abdomen/pelvis shows no acute pathology, age indeterminate T3/8/9/10 fx, of which at least T3/8/9 were noted in 2016 and 2018  - defer to NSG for management of thoracic fx  - known AAA 6.2cm, up from 5.2 2018, 4.6 2016; can consider GOC vs workup given DNR status  - tertiary survey in AM  - dispo per NSG/ED    Discussed with Dr. Soto  Trauma Surgery  [phone]

## 2020-12-31 NOTE — ED PROVIDER NOTE - PROGRESS NOTE DETAILS
Attending Maria D Burns: [facility] will come to evaluate. Kristine Arnold MD: Received sign out on patient. Patient reassessed. Vital signs stable. NAD. AAOx0. Moving all extremities. Awake and alert. Repeat CT is stable. Discussed with neurosurgery. Plan for a second repeat CT in 24 hours. Pending CT chest/A/P and trauma recs. Kristine Arnold MD: As per trauma, no acute intervention, will follow if admitted. CT chest/abdomen/pelvis shows no acute pathology, age indeterminate T3/8/9/10 fx, of which at least T3/8/9 were noted in 2016 and 2018. 6cm AAA unruptured on CT, pt has hx of 5cm AAA in 2018. Kristine Arnold MD: As per trauma, no acute intervention, will follow if admitted. CT chest/abdomen/pelvis shows no acute pathology, age indeterminate T3/8/9/10 fx, of which at least T3/8/9 were noted in 2016 and 2018. 6cm AAA unruptured on CT, pt has hx of 5cm AAA in 2018. TO be admitted to medicine.

## 2020-12-31 NOTE — ED PROVIDER NOTE - CLINICAL SUMMARY MEDICAL DECISION MAKING FREE TEXT BOX
Attending Maria D Burns: 86 y/o male transferred after ct head showed evidence of small SAH and small subdural. per report pt aaox1 at baseline. upona rrival airway intact, bl breath sounds and moving all extremities. unclear how pt fell. nontoxic appearing. laceration repaired prior to arrival. pt given platetelts. will update tetanus. d/w neurosurgery and trauma. re-eval

## 2020-12-31 NOTE — ED PROVIDER NOTE - OBJECTIVE STATEMENT
86 y/o male with PMHx of HTN, seizures, anxiety, COPD, DM, dementia BIB EMS from [facility] to the ED c/o lac s/p mechanical fall x today. Pt unable to provide Hx due to dementia, Hx as per nursing home papers. Pt was found lying in the TV room with a 6cm horizontal lac to his occipital head. Pt moving all extremities spontaneously without difficulty, noted to be his baseline mental status. NKDA. 86 y/o male with PMHx of HTN, seizures, anxiety, COPD, DM, dementia BIB EMS from [facility] to the ED c/o lac s/p mechanical fall x today. Pt unable to provide Hx due to dementia, Hx as per nursing home papers. Pt was found lying in the TV room with a 5cm horizontal lac to his occipital head. Pt moving all extremities spontaneously without difficulty, noted to be his baseline mental status as per nursing home records.    : [facility] [id number] uses and patient unable to provide further hx.

## 2020-12-31 NOTE — ED PROVIDER NOTE - ENMT, MLM
Airway patent, Nasal mucosa clear. Mouth with DRY mucosa and poor dentition. Throat has no vesicles, no oropharyngeal exudates and uvula is midline.

## 2020-12-31 NOTE — ED PROVIDER NOTE - CRANIAL NERVE AND PUPILLARY EXAM
cranial nerves 2-12 intact/cough reflex intact/corneal reflex intact/central and peripheral vision intact/central vision intact/peripheral vision intact/extra-ocular movements intact/gag reflex intact/tongue is midline cranial nerves 2-12 intact/extra-ocular movements intact/tongue is midline

## 2020-12-31 NOTE — ED ADULT NURSE NOTE - NSIMPLEMENTINTERV_GEN_ALL_ED
Implemented All Fall with Harm Risk Interventions:  [place] to call system. Call bell, personal items and telephone within reach. Instruct patient to call for assistance. Room bathroom lighting operational. Non-slip footwear when patient is off stretcher. Physically safe environment: no spills, clutter or unnecessary equipment. Stretcher in lowest position, wheels locked, appropriate side rails in place. Provide visual cue, wrist band, yellow gown, etc. Monitor gait and stability. Monitor for mental status changes and reorient to person, place, and time. Review medications for side effects contributing to fall risk. Reinforce activity limits and safety measures with patient and family. Provide visual clues: red socks.

## 2021-01-01 DIAGNOSIS — D64.9 ANEMIA, UNSPECIFIED: ICD-10-CM

## 2021-01-01 DIAGNOSIS — Z29.9 ENCOUNTER FOR PROPHYLACTIC MEASURES, UNSPECIFIED: ICD-10-CM

## 2021-01-01 DIAGNOSIS — F01.50 VASCULAR DEMENTIA WITHOUT BEHAVIORAL DISTURBANCE: ICD-10-CM

## 2021-01-01 DIAGNOSIS — M48.50XA COLLAPSED VERTEBRA, NOT ELSEWHERE CLASSIFIED, SITE UNSPECIFIED, INITIAL ENCOUNTER FOR FRACTURE: ICD-10-CM

## 2021-01-01 DIAGNOSIS — I62.9 NONTRAUMATIC INTRACRANIAL HEMORRHAGE, UNSPECIFIED: ICD-10-CM

## 2021-01-01 DIAGNOSIS — E11.9 TYPE 2 DIABETES MELLITUS WITHOUT COMPLICATIONS: ICD-10-CM

## 2021-01-01 DIAGNOSIS — I71.4 ABDOMINAL AORTIC ANEURYSM, WITHOUT RUPTURE: ICD-10-CM

## 2021-01-01 DIAGNOSIS — J44.9 CHRONIC OBSTRUCTIVE PULMONARY DISEASE, UNSPECIFIED: ICD-10-CM

## 2021-01-01 DIAGNOSIS — Z87.898 PERSONAL HISTORY OF OTHER SPECIFIED CONDITIONS: ICD-10-CM

## 2021-01-01 DIAGNOSIS — S06.5X9A TRAUMATIC SUBDURAL HEMORRHAGE WITH LOSS OF CONSCIOUSNESS OF UNSPECIFIED DURATION, INITIAL ENCOUNTER: ICD-10-CM

## 2021-01-01 LAB
A1C WITH ESTIMATED AVERAGE GLUCOSE RESULT: 5.8 % — HIGH (ref 4–5.6)
ANION GAP SERPL CALC-SCNC: 10 MMOL/L — SIGNIFICANT CHANGE UP (ref 5–17)
ANION GAP SERPL CALC-SCNC: 9 MMOL/L — SIGNIFICANT CHANGE UP (ref 5–17)
BUN SERPL-MCNC: 26 MG/DL — HIGH (ref 7–23)
BUN SERPL-MCNC: 27 MG/DL — HIGH (ref 7–23)
CALCIUM SERPL-MCNC: 10 MG/DL — SIGNIFICANT CHANGE UP (ref 8.4–10.5)
CALCIUM SERPL-MCNC: 9.6 MG/DL — SIGNIFICANT CHANGE UP (ref 8.4–10.5)
CHLORIDE SERPL-SCNC: 101 MMOL/L — SIGNIFICANT CHANGE UP (ref 96–108)
CHLORIDE SERPL-SCNC: 102 MMOL/L — SIGNIFICANT CHANGE UP (ref 96–108)
CO2 SERPL-SCNC: 27 MMOL/L — SIGNIFICANT CHANGE UP (ref 22–31)
CO2 SERPL-SCNC: 28 MMOL/L — SIGNIFICANT CHANGE UP (ref 22–31)
CREAT SERPL-MCNC: 1.02 MG/DL — SIGNIFICANT CHANGE UP (ref 0.5–1.3)
CREAT SERPL-MCNC: 1.04 MG/DL — SIGNIFICANT CHANGE UP (ref 0.5–1.3)
ESTIMATED AVERAGE GLUCOSE: 120 MG/DL — HIGH (ref 68–114)
FERRITIN SERPL-MCNC: 519 NG/ML — HIGH (ref 30–400)
FOLATE SERPL-MCNC: >20 NG/ML — SIGNIFICANT CHANGE UP
GLUCOSE SERPL-MCNC: 100 MG/DL — HIGH (ref 70–99)
GLUCOSE SERPL-MCNC: 124 MG/DL — HIGH (ref 70–99)
HCT VFR BLD CALC: 30.2 % — LOW (ref 39–50)
HCT VFR BLD CALC: 31.1 % — LOW (ref 39–50)
HGB BLD-MCNC: 9.7 G/DL — LOW (ref 13–17)
HGB BLD-MCNC: 9.8 G/DL — LOW (ref 13–17)
IRON SATN MFR SERPL: 11 % — LOW (ref 16–55)
IRON SATN MFR SERPL: 29 UG/DL — LOW (ref 45–165)
MCHC RBC-ENTMCNC: 31.5 GM/DL — LOW (ref 32–36)
MCHC RBC-ENTMCNC: 32.1 GM/DL — SIGNIFICANT CHANGE UP (ref 32–36)
MCHC RBC-ENTMCNC: 34.1 PG — HIGH (ref 27–34)
MCHC RBC-ENTMCNC: 34.5 PG — HIGH (ref 27–34)
MCV RBC AUTO: 107.5 FL — HIGH (ref 80–100)
MCV RBC AUTO: 108.4 FL — HIGH (ref 80–100)
NRBC # BLD: 0 /100 WBCS — SIGNIFICANT CHANGE UP (ref 0–0)
NRBC # BLD: 0 /100 WBCS — SIGNIFICANT CHANGE UP (ref 0–0)
PLATELET # BLD AUTO: 133 K/UL — LOW (ref 150–400)
PLATELET # BLD AUTO: 135 K/UL — LOW (ref 150–400)
POTASSIUM SERPL-MCNC: 3.9 MMOL/L — SIGNIFICANT CHANGE UP (ref 3.5–5.3)
POTASSIUM SERPL-MCNC: 4 MMOL/L — SIGNIFICANT CHANGE UP (ref 3.5–5.3)
POTASSIUM SERPL-SCNC: 3.9 MMOL/L — SIGNIFICANT CHANGE UP (ref 3.5–5.3)
POTASSIUM SERPL-SCNC: 4 MMOL/L — SIGNIFICANT CHANGE UP (ref 3.5–5.3)
RBC # BLD: 2.81 M/UL — LOW (ref 4.2–5.8)
RBC # BLD: 2.87 M/UL — LOW (ref 4.2–5.8)
RBC # FLD: 13.3 % — SIGNIFICANT CHANGE UP (ref 10.3–14.5)
RBC # FLD: 13.3 % — SIGNIFICANT CHANGE UP (ref 10.3–14.5)
SARS-COV-2 IGG SERPL QL IA: POSITIVE
SARS-COV-2 IGM SERPL IA-ACNC: 5.84 INDEX — HIGH
SODIUM SERPL-SCNC: 138 MMOL/L — SIGNIFICANT CHANGE UP (ref 135–145)
SODIUM SERPL-SCNC: 139 MMOL/L — SIGNIFICANT CHANGE UP (ref 135–145)
TIBC SERPL-MCNC: 254 UG/DL — SIGNIFICANT CHANGE UP (ref 220–430)
TROPONIN T, HIGH SENSITIVITY RESULT: 18 NG/L — SIGNIFICANT CHANGE UP (ref 0–51)
TSH SERPL-MCNC: 1.65 UIU/ML — SIGNIFICANT CHANGE UP (ref 0.27–4.2)
UIBC SERPL-MCNC: 225 UG/DL — SIGNIFICANT CHANGE UP (ref 110–370)
VIT B12 SERPL-MCNC: 1905 PG/ML — HIGH (ref 232–1245)
WBC # BLD: 4.88 K/UL — SIGNIFICANT CHANGE UP (ref 3.8–10.5)
WBC # BLD: 5.18 K/UL — SIGNIFICANT CHANGE UP (ref 3.8–10.5)
WBC # FLD AUTO: 4.88 K/UL — SIGNIFICANT CHANGE UP (ref 3.8–10.5)
WBC # FLD AUTO: 5.18 K/UL — SIGNIFICANT CHANGE UP (ref 3.8–10.5)

## 2021-01-01 PROCEDURE — 99223 1ST HOSP IP/OBS HIGH 75: CPT

## 2021-01-01 PROCEDURE — 70450 CT HEAD/BRAIN W/O DYE: CPT | Mod: 26

## 2021-01-01 PROCEDURE — 99221 1ST HOSP IP/OBS SF/LOW 40: CPT

## 2021-01-01 RX ORDER — FOLIC ACID 0.8 MG
1 TABLET ORAL DAILY
Refills: 0 | Status: DISCONTINUED | OUTPATIENT
Start: 2021-01-01 | End: 2021-01-04

## 2021-01-01 RX ORDER — PREGABALIN 225 MG/1
1000 CAPSULE ORAL DAILY
Refills: 0 | Status: DISCONTINUED | OUTPATIENT
Start: 2021-01-01 | End: 2021-01-04

## 2021-01-01 RX ORDER — CHOLECALCIFEROL (VITAMIN D3) 125 MCG
1000 CAPSULE ORAL DAILY
Refills: 0 | Status: DISCONTINUED | OUTPATIENT
Start: 2021-01-01 | End: 2021-01-04

## 2021-01-01 RX ORDER — LEVETIRACETAM 250 MG/1
500 TABLET, FILM COATED ORAL EVERY 12 HOURS
Refills: 0 | Status: DISCONTINUED | OUTPATIENT
Start: 2021-01-01 | End: 2021-01-04

## 2021-01-01 RX ORDER — FERROUS SULFATE 325(65) MG
325 TABLET ORAL
Refills: 0 | Status: DISCONTINUED | OUTPATIENT
Start: 2021-01-01 | End: 2021-01-04

## 2021-01-01 RX ADMIN — PREGABALIN 1000 MICROGRAM(S): 225 CAPSULE ORAL at 13:57

## 2021-01-01 RX ADMIN — Medication 325 MILLIGRAM(S): at 18:05

## 2021-01-01 RX ADMIN — LEVETIRACETAM 400 MILLIGRAM(S): 250 TABLET, FILM COATED ORAL at 03:53

## 2021-01-01 RX ADMIN — Medication 1000 UNIT(S): at 13:57

## 2021-01-01 RX ADMIN — Medication 1 MILLIGRAM(S): at 13:56

## 2021-01-01 RX ADMIN — LEVETIRACETAM 400 MILLIGRAM(S): 250 TABLET, FILM COATED ORAL at 18:06

## 2021-01-01 RX ADMIN — Medication 10 MILLIGRAM(S): at 18:05

## 2021-01-01 RX ADMIN — Medication 1 TABLET(S): at 13:57

## 2021-01-01 NOTE — OCCUPATIONAL THERAPY INITIAL EVALUATION ADULT - ADDITIONAL COMMENTS
CT Chest (12/31): No evidence of acute intrathoracic, intra-abdominal or intrapelvic trauma. Infrarenal abdominal aortic aneurysm measuring up to 6.2 cm. Age indeterminate compression fracture deformities of the T3, T8, T9 and T10 vertebral bodies.  CT Head (12/31): Right frontal subarachnoid hemorrhage, quadrigeminal plate cistern subarachnoid hemorrhage and small subdural hematoma along the posterior falx, not significantly changed.

## 2021-01-01 NOTE — ED ADULT NURSE REASSESSMENT NOTE - NS ED NURSE REASSESS COMMENT FT1
Pt is resting comfortably, VSS, pt at baseline mental status, +strength and sensation noted in all extremities.  Will continue to monitor.

## 2021-01-01 NOTE — OCCUPATIONAL THERAPY INITIAL EVALUATION ADULT - DIAGNOSIS, OT EVAL
Pt demonstrates decrease in cognition (dementia at baseline) balance and strength affecting ADLs and functional mobility.

## 2021-01-01 NOTE — H&P ADULT - NSHPLABSRESULTS_GEN_ALL_CORE
Labs, imaging  personally reviewed by me.     CBC found mild anemia - Hgb of 10, with macrocytosis, MCV of 106.6.   Pt has mild thrombocytopenia- platelet count of 140.   Abnormal RBC morphology noted- slight ovalocytes, schistocytes.  INR was 1.17.  HS troponin T 20, repeat 18.  CMP found elevated SCr of 1.24.     LABS:                        10.0   5.35  )-----------( 140      ( 31 Dec 2020 22:23 )             30.5     Hgb Trend: 10.0<--  12-31    138  |  101  |  31<H>  ----------------------------<  115<H>  4.2   |  27  |  1.24    Ca    9.7      31 Dec 2020 22:23    TPro  7.1  /  Alb  4.3  /  TBili  0.3  /  DBili  x   /  AST  17  /  ALT  9<L>  /  AlkPhos  63  12-31    Creatinine Trend: 1.24<--  PT/INR - ( 31 Dec 2020 22:23 )   PT: 13.9 sec;   INR: 1.17 ratio         PTT - ( 31 Dec 2020 22:23 )  PTT:28.2 sec    < from: CT Abdomen and Pelvis w/ IV Cont (12.31.20 @ 23:36) >    FINDINGS:  CHEST:  LUNGS: Patent central airways. No pulmonary nodules, masses or consolidation. Biapical scarring. Lingular subsegmental atelectasis. Left upper and lower lobe calcified granuloma.  PLEURA: No pleural effusion.  VESSELS: No central pulmonary embolism. No aortic dissection or evidence of aortic traumatic injury.  HEART: Heart size is normal. No pericardial effusion. Coronary artery calcifications.  MEDIASTINUM AND FUAD: No lymphadenopathy.  CHEST WALL AND LOWER NECK: Within normal limits.  BONES: Old fracture deformity of the left scapula. Age-indeterminate compression fracture deformity of the T3 vertebral body and superior endplates ofthe T8, T9 and T10 vertebral bodies resulting in less than 50% loss of vertebral body height.    ABDOMEN AND PELVIS:  LIVER: Within normal limits.  BILE DUCTS: Normal caliber.  GALLBLADDER: Within normal limits.  SPLEEN: Within normal limits.  PANCREAS: Within normal limits.  ADRENALS: Within normal limits.  KIDNEYS/URETERS: Bilateral renal subcentimeter hypodense lesions which are too small for accurate characterization. No hydronephrosis.    BLADDER: Within normal limits.  REPRODUCTIVE ORGANS: Prostate within normal limits.    BOWEL: Scattered colonic diverticulosis without diverticulitis. No bowel obstruction or inflammation. Appendix is normal. Metal stool throughout the colon.  PERITONEUM: No ascites.  VESSELS: Atherosclerotic changes. 6.2 cm infrarenal abdominal aortic aneurysm. No intra-abdominal hemorrhage.  RETROPERITONEUM/LYMPH NODES: No lymphadenopathy.  ABDOMINAL WALL: Small fat-containing umbilical hernia.  BONES: Degenerative changes of the spine. 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    IMPRESSION:  1.  No evidence of acute intrathoracic, intra-abdominal or intrapelvic trauma.  2.  Infrarenal abdominal aortic aneurysm measuring up to 6.2 cm.  3.  Age indeterminate compression fracture deformities of the T3, T8, T9 and T10 vertebral bodies.    < from: CT Head No Cont (12.31.20 @ 22:52) >    IMPRESSION:  Right frontal subarachnoid hemorrhage, quadrigeminal plate cistern subarachnoid hemorrhage and small subdural hematoma along the posterior falx, not significantly changed.    < end of copied text > Labs, imaging  personally reviewed by me.     CBC found mild anemia - Hgb of 10, with macrocytosis, MCV of 106.6.   Pt has mild thrombocytopenia- platelet count of 140.   Abnormal RBC morphology noted- slight ovalocytes, schistocytes.  INR was 1.17.  HS troponin T 20, repeat 18.  CMP found elevated SCr of 1.24.     EKG-   sinus HR 93, with RBBB, 1st deg AV block QTc 442, TWI in V1-V3.   LABS:                        10.0   5.35  )-----------( 140      ( 31 Dec 2020 22:23 )             30.5     Hgb Trend: 10.0<--  12-31    138  |  101  |  31<H>  ----------------------------<  115<H>  4.2   |  27  |  1.24    Ca    9.7      31 Dec 2020 22:23    TPro  7.1  /  Alb  4.3  /  TBili  0.3  /  DBili  x   /  AST  17  /  ALT  9<L>  /  AlkPhos  63  12-31    Creatinine Trend: 1.24<--  PT/INR - ( 31 Dec 2020 22:23 )   PT: 13.9 sec;   INR: 1.17 ratio         PTT - ( 31 Dec 2020 22:23 )  PTT:28.2 sec    < from: CT Abdomen and Pelvis w/ IV Cont (12.31.20 @ 23:36) >    FINDINGS:  CHEST:  LUNGS: Patent central airways. No pulmonary nodules, masses or consolidation. Biapical scarring. Lingular subsegmental atelectasis. Left upper and lower lobe calcified granuloma.  PLEURA: No pleural effusion.  VESSELS: No central pulmonary embolism. No aortic dissection or evidence of aortic traumatic injury.  HEART: Heart size is normal. No pericardial effusion. Coronary artery calcifications.  MEDIASTINUM AND FUAD: No lymphadenopathy.  CHEST WALL AND LOWER NECK: Within normal limits.  BONES: Old fracture deformity of the left scapula. Age-indeterminate compression fracture deformity of the T3 vertebral body and superior endplates ofthe T8, T9 and T10 vertebral bodies resulting in less than 50% loss of vertebral body height.    ABDOMEN AND PELVIS:  LIVER: Within normal limits.  BILE DUCTS: Normal caliber.  GALLBLADDER: Within normal limits.  SPLEEN: Within normal limits.  PANCREAS: Within normal limits.  ADRENALS: Within normal limits.  KIDNEYS/URETERS: Bilateral renal subcentimeter hypodense lesions which are too small for accurate characterization. No hydronephrosis.    BLADDER: Within normal limits.  REPRODUCTIVE ORGANS: Prostate within normal limits.    BOWEL: Scattered colonic diverticulosis without diverticulitis. No bowel obstruction or inflammation. Appendix is normal. Metal stool throughout the colon.  PERITONEUM: No ascites.  VESSELS: Atherosclerotic changes. 6.2 cm infrarenal abdominal aortic aneurysm. No intra-abdominal hemorrhage.  RETROPERITONEUM/LYMPH NODES: No lymphadenopathy.  ABDOMINAL WALL: Small fat-containing umbilical hernia.  BONES: Degenerative changes of the spine. 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    IMPRESSION:  1.  No evidence of acute intrathoracic, intra-abdominal or intrapelvic trauma.  2.  Infrarenal abdominal aortic aneurysm measuring up to 6.2 cm.  3.  Age indeterminate compression fracture deformities of the T3, T8, T9 and T10 vertebral bodies.    < from: CT Head No Cont (12.31.20 @ 22:52) >    IMPRESSION:  Right frontal subarachnoid hemorrhage, quadrigeminal plate cistern subarachnoid hemorrhage and small subdural hematoma along the posterior falx, not significantly changed.    < end of copied text >

## 2021-01-01 NOTE — OCCUPATIONAL THERAPY INITIAL EVALUATION ADULT - LEVEL OF INDEPENDENCE: SIT/SUPINE, REHAB EVAL
Hematology Oncology Progress Note Follow up for: metastatic rectal cancer CC:He feels a little better Chart notes reviewed since last visit. Case discussed with following:  
 
Patient complains of the following: No complaints Additional concerns noted by the staff:  
 
Patient Vitals for the past 24 hrs: 
 BP Temp Pulse Resp SpO2 Weight  
06/21/19 0959 158/85  (!) 109     
06/21/19 0800 166/90 98.4 °F (36.9 °C) 98 20 100 %   
06/21/19 0703      110.9 kg (244 lb 7 oz) 06/21/19 0544 160/81  97     
06/21/19 0319 163/86 98.4 °F (36.9 °C) (!) 105 18 97 %   
06/20/19 2333 (!) 147/91  (!) 108  100 %   
06/20/19 2201 153/86 98.2 °F (36.8 °C) (!) 107 18 99 %   
06/20/19 2000 153/84 99.5 °F (37.5 °C) (!) 109 18 99 %   
06/20/19 1710 184/88  (!) 110     
06/20/19 1600 144/88 98.2 °F (36.8 °C) (!) 106 20 99 %   
06/20/19 1047 (!) 169/91 98.3 °F (36.8 °C) (!) 105 20 97 %   
 
 
ROS negative for 11 organ systems except as ntoed above. Physical Examination: 
Gen NAD 
CV reg Lungs clear 
abd benign Labs: 
Recent Results (from the past 24 hour(s)) GLUCOSE, POC Collection Time: 06/20/19 11:40 AM  
Result Value Ref Range Glucose (POC) 249 (H) 65 - 100 mg/dL Performed by Shad Barksdale (PCT) GLUCOSE, POC Collection Time: 06/20/19  6:29 PM  
Result Value Ref Range Glucose (POC) 243 (H) 65 - 100 mg/dL Performed by Shad Barksdale (PCT) GLUCOSE, POC Collection Time: 06/20/19 11:50 PM  
Result Value Ref Range Glucose (POC) 195 (H) 65 - 100 mg/dL Performed by Louie Kayser ([address]) GLUCOSE, POC Collection Time: 06/21/19  5:43 AM  
Result Value Ref Range Glucose (POC) 242 (H) 65 - 100 mg/dL Performed by Louie Kayser ([address]) RENAL FUNCTION PANEL Collection Time: 06/21/19  5:50 AM  
Result Value Ref Range Sodium 144 136 - 145 mmol/L Potassium 3.8 3.5 - 5.1 mmol/L  Chloride 116 (H) 97 - 108 mmol/L  
 CO2 20 (L) 21 - 32 mmol/L Anion gap 8 5 - 15 mmol/L Glucose 222 (H) 65 - 100 mg/dL BUN 40 (H) 6 - 20 MG/DL Creatinine 1.12 0.70 - 1.30 MG/DL  
 BUN/Creatinine ratio 36 (H) 12 - 20 GFR est AA >60 >60 ml/min/1.73m2 GFR est non-AA >60 >60 ml/min/1.73m2 Calcium 8.0 (L) 8.5 - 10.1 MG/DL Phosphorus 2.9 2.6 - 4.7 MG/DL Albumin 1.9 (L) 3.5 - 5.0 g/dL CBC WITH MANUAL DIFF Collection Time: 06/21/19  5:50 AM  
Result Value Ref Range WBC 9.9 4.1 - 11.1 K/uL  
 RBC 2.83 (L) 4.10 - 5.70 M/uL HGB 8.3 (L) 12.1 - 17.0 g/dL HCT 26.5 (L) 36.6 - 50.3 % MCV 93.6 80.0 - 99.0 FL  
 MCH 29.3 26.0 - 34.0 PG  
 MCHC 31.3 30.0 - 36.5 g/dL  
 RDW 17.6 (H) 11.5 - 14.5 % PLATELET 126 536 - 479 K/uL MPV 11.5 8.9 - 12.9 FL  
 NRBC 0.4 (H) 0  WBC ABSOLUTE NRBC 0.04 (H) 0.00 - 0.01 K/uL NEUTROPHILS 89 (H) 32 - 75 % BAND NEUTROPHILS 0 0 - 6 % LYMPHOCYTES 5 (L) 12 - 49 % MONOCYTES 4 (L) 5 - 13 % EOSINOPHILS 2 0 - 7 % BASOPHILS 0 0 - 1 % METAMYELOCYTES 0 0 % MYELOCYTES 0 0 % PROMYELOCYTES 0 0 % BLASTS 0 0 % OTHER CELL 0 0 IMMATURE GRANULOCYTES 0 %  
 ABS. NEUTROPHILS 8.8 (H) 1.8 - 8.0 K/UL  
 ABS. LYMPHOCYTES 0.5 (L) 0.8 - 3.5 K/UL  
 ABS. MONOCYTES 0.4 0.0 - 1.0 K/UL  
 ABS. EOSINOPHILS 0.2 0.0 - 0.4 K/UL  
 ABS. BASOPHILS 0.0 0.0 - 0.1 K/UL  
 ABS. IMM. GRANS. 0.0 K/UL  
 DF MANUAL    
 RBC COMMENTS NORMOCYTIC, NORMOCHROMIC Imaging: 
KUB: 
Direct comparison is made to prior plain radiographs dated Juanita 3, 2019. 
  
Nasogastric tube and side port overlie the stomach. There are several dilated 
small bowel loops in the abdomen. Degree of small bowel dilatation appears to 
have worsened as compared to prior plain radiographs dated Juanita 3, 2019. No free 
intraperitoneal gas is visualized on supine views. No pathologic calcification 
is seen. 
  
IMPRESSION: Multiple dilated loops of small bowel. Assessment and Plan: SBO: 
 - Had adhesions lysed in OR with Dr Ron Davis 6/13. 
- bowels waking up, plan per surgery 
 
 and cxray unremarkable - Monitor closely BOBBI: 
- resolved Metastatic rectal cancer: - Follows with Dr. Betsy Lucas in [address] [place] [address] office,  several chemotherapeutic options ahead for treatment - Will FU with Dr Betsy Lucas after DC 
 
DM - on insulin Hypertension - clonidine, metoprolol, and nitro bid ordered. Please call over the weekend with any questions. Will have service f/u on Monday. Raiza Bean moderate assist (50% patients effort)

## 2021-01-01 NOTE — H&P ADULT - PROBLEM SELECTOR PLAN 1
Alert and oriented to person, place, time/situation. normal mood and affect. no apparent risk to self or others. Hold home dose of baby aspirin.  Patient received 1u platelets at [facility].  As per neurosurgical team, the patient is not planned for surgical intervention at this time.  Continue q4h neuro checks.   Continue keppra 500mg BID. On repeat CTH, the right frontal SAH, quadrigeminal plate cistern SAH and small subdural hematoma along the posterior falx were not significantly changed.   Obtain 24 hour CTH.   Hold home dose of baby aspirin.  Patient received 1u platelets at [facility].  As per neurosurgical team, the patient is not planned for surgical intervention at this time.  Continue q4h neuro checks.   Continue keppra 500mg BID. On repeat CTH, the right frontal SAH, quadrigeminal plate cistern SAH and small subdural hematoma along the posterior falx were not significantly changed.   Obtain 24 hour CTH.   Hold home dose of baby aspirin.  Patient received 1u platelets at [facility].  As per neurosurgical team, the patient is not planned for surgical intervention at this time.  Continue q4h neuro checks. Start seizure prophylaxis  Continue keppra 500mg BID.  Maintain SBP< 160 On repeat CTH, the right frontal SAH, quadrigeminal plate cistern SAH and small subdural hematoma along the posterior falx were not significantly changed.   Obtain 24 hour CTH.   Hold home dose of baby aspirin.  Patient received 1u platelets at [facility].  As per neurosurgical team, the patient is not planned for surgical intervention at this time.  Continue q4h neuro checks. Start seizure prophylaxis  Continue keppra 500mg BID.  Maintain SBP< 160  Neurosurgical team did not recommend treatment with nimodipine.

## 2021-01-01 NOTE — H&P ADULT - PROBLEM SELECTOR PLAN 8
VTE ppx: venodyne boots  Activity: bedrest for now   Diet: swallow eval, then soft food, DASH  PT and OT evaluation The patient has documented history of COPD and TB, but not displaying any signs of respiratory distress currently.

## 2021-01-01 NOTE — H&P ADULT - HISTORY OF PRESENT ILLNESS
This patient is an 84yo gentleman with PMH of tuberculosis, htn, seizures, anxiety, COPD, DM, vascular dementia, PVD, iron deficiency anemia, hypothyroidism, T2DM, vitamin D deficiency, atherosclerosis who presents to the ED for subacute hemorrhage and subdural hemorrhage. The patient is unable to provide history due to his dementia, thus history was obtained from nursing home and ED documentation from [facility]. The nurse was alerted that the patient lying on the floor next to the TV room. He was found to have a horizontal laceration in the occipital area with bleeding at the site. The patient did not complain of pain, and was able to move all extremities without limits. No change in level of consciousness. An ice pack and dressing were applied. The patient was unable to explain what happened. Dr. Crooks was made aware and the patient was transferred to The ED. He was reportedly moving all extremities spontaneously without difficulty, and at his baseline mental status. Laceration was repaired in the ED using staples. He was transferred to [facility] to continue his care    dash, no sweets, soft chopped diet  glucerna BID  This patient is an 84yo gentleman with PMH of tuberculosis, htn, seizures, anxiety, COPD, DM, vascular dementia, PVD, iron deficiency anemia, hypothyroidism, T2DM, vitamin D deficiency, atherosclerosis who presents to the ED for subacute hemorrhage and subdural hemorrhage. The patient is unable to provide history due to his dementia, thus history was obtained from nursing home and ED documentation from [facility]. The nurse was alerted that the patient lying on the floor next to the TV room. He was found to have a horizontal laceration in the occipital area with bleeding at the site. The patient did not complain of pain, and was able to move all extremities without limits. No change in level of consciousness. An ice pack and dressing were applied. The patient was unable to explain what happened. Dr. Crooks was made aware and the patient was transferred to The ED. He was reportedly moving all extremities spontaneously without difficulty, and at his baseline mental status. Laceration was repaired in the ED using staples. He was transferred to [facility] to continue his care.  I spoke with the patient using [place] . The patient is not a reliable historian.  He mumbled and was difficult to hear throughout the interview. He denied headache, fever, chills but complained of neck pain with movement. He also denied chest pain, dyspnea, palpitations, N/V. He uses a walker to ambulate. He states he tripped and fell while walking outside.     He is unable to provide his surgical history due to his dementia.  This patient is an 86yo gentleman with PMH of tuberculosis, htn, seizures, anxiety, COPD, DM, vascular dementia, PVD, iron deficiency anemia, hypothyroidism, T2DM, vitamin D deficiency, atherosclerosis who presents to the ED for subacute hemorrhage and subdural hemorrhage. The patient is unable to provide history due to his dementia, thus history was obtained from nursing home and ED documentation from [facility]. The nurse was alerted that the patient lying on the floor next to the TV room. He was found to have a horizontal laceration in the occipital area with bleeding at the site. The patient did not complain of pain, and was able to move all extremities without limits. No change in level of consciousness. An ice pack and dressing were applied. The patient was unable to explain what happened. Dr. Crooks was made aware and the patient was transferred to The ED. He was reportedly moving all extremities spontaneously without difficulty, and at his baseline mental status. Laceration was repaired in the ED using staples. He was transferred to [facility] to continue his care.  I spoke with the patient using [place] . The patient is not a reliable historian.  He mumbled and was difficult to hear throughout the interview. He denied headache, fever, chills but complained of neck pain with movement. He also denied chest pain, dyspnea, palpitations, N/V. He uses a walker to ambulate. He states he tripped and fell while walking outside.   I spoke with CADEN Anne from [facility], who stated that the patient typically walks with one-person assist, with wheelchair. He is usually A&Ox2. He was found conscious, on the ground.   He is unable to provide his surgical history due to his dementia.

## 2021-01-01 NOTE — OCCUPATIONAL THERAPY INITIAL EVALUATION ADULT - LIVES WITH, PROFILE
Pt is poor historian, however as per chart, pt resides in snf and requires assist ADLs for ambulation with RW and uses w/c./alone

## 2021-01-01 NOTE — OCCUPATIONAL THERAPY INITIAL EVALUATION ADULT - PERTINENT HX OF CURRENT PROBLEM, REHAB EVAL
86yo gentleman with PMH of tuberculosis, htn, seizures, anxiety, COPD, DM, vascular dementia, PVD, iron deficiency anemia, hypothyroidism, T2DM, vitamin D deficiency, atherosclerosis who presents to the ED for subacute hemorrhage and subdural hemorrhage. The patient is unable to provide history due to his dementia,

## 2021-01-01 NOTE — H&P ADULT - NSHPPHYSICALEXAM_GEN_ALL_CORE
T(C): 37.1 (01-01-21 @ 04:33), Max: 37.2 (01-01-21 @ 02:25)  HR: 84 (01-01-21 @ 04:33) (84 - 97)  BP: 128/74 (01-01-21 @ 04:33) (125/74 - 155/80)  RR: 22 (01-01-21 @ 04:33) (18 - 24)  SpO2: 96% (01-01-21 @ 04:33) (96% - 98%)  Wt(kg): --    PHYSICAL EXAM:  GENERAL: NAD, frail appearing elderly gentleman awake and alert in bed  HEAD:  occipital laceration with minimal surrounding dried blood, staples in place  EYES: EOMI, conjunctiva and sclera clear  ENMT: Moist mucous membranes, poor dentition  NECK: Supple, no cervical lymphadenopathy  NERVOUS SYSTEM:  Alert & Oriented X 1-2 (state his name and that he is in a "clinic") CN II-XII intact, follows commands 5/5 BUE and BLE motor strength, full sensation to light touch   CHEST/LUNG: Clear to auscultation bilaterally; No rales, no  rhonchi  HEART: Regular rate and rhythm; No murmurs, rubs, or gallops  ABDOMEN: Soft, Nontender, Nondistended  EXTREMITIES:  2+ radial pulses, No cyanosis or edema  SKIN: warm, dry

## 2021-01-01 NOTE — ED ADULT NURSE REASSESSMENT NOTE - NS ED NURSE REASSESS COMMENT FT1
Received report from Miracle NEGRETE. PT observed resting comfortably in bed. Pt denies any needs at this time. Neuro checks in paper chart. Plan of care reviewed with pt. Pt educated on call bell system and provided call bell. Bed in lowest position, wheels locked, and patient placed in position of comfort.

## 2021-01-01 NOTE — OCCUPATIONAL THERAPY INITIAL EVALUATION ADULT - PRECAUTIONS/LIMITATIONS, REHAB EVAL
CONTINUE: hus history was obtained from nursing home and ED documentation from [facility]. The nurse was alerted that the patient lying on the floor next to the TV room. He was found to have a horizontal laceration in the occipital area with bleeding at the site. The patient did not complain of pain, and was able to move all extremities without limits. No change in level of consciousness. An ice pack and dressing were applied. The patient was unable to explain what happened. Dr. Crooks was made aware and the patient was transferred to The ED. He was reportedly moving all extremities spontaneously without difficulty, and at his baseline mental status. Laceration was repaired in the ED using staples. He was transferred to [facility] to continue his care./fall precautions

## 2021-01-01 NOTE — H&P ADULT - NSICDXPASTMEDICALHX_GEN_ALL_CORE_FT
PAST MEDICAL HISTORY:  Chronic obstructive pulmonary disease, unspecified COPD type     DM (diabetes mellitus)     History of atherosclerosis     HTN (hypertension)     PVD (peripheral vascular disease)     Seizure     Seizures     T2DM (type 2 diabetes mellitus)     Vascular dementia     Vitamin D deficiency

## 2021-01-01 NOTE — H&P ADULT - PROBLEM SELECTOR PLAN 2
Hold home dose of aspirin.  Provide frequent reorientation.  Start fall and aspiration precautions. Elevate head of bed.  This patient is not currently on a statin- clarify with PMD in AM.

## 2021-01-01 NOTE — H&P ADULT - ASSESSMENT
The patient has a MOLST form indicating DNR/ DNI status, no central line or vasopressors.     The patient has swallowing precautions  risk of falls  he has poor vision  he is incontinent     soft chopped     - gait instability due to arthritis, peripheral neuropathy,. 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

## 2021-01-01 NOTE — H&P ADULT - PROBLEM SELECTOR PLAN 3
The patient was incidentally found on CT A/P to have a 6.2cm infrarenal abdominal aortic aneurysm.   The patient should The patient was incidentally found on CT A/P to have a 6.2cm infrarenal abdominal aortic aneurysm.  Clarify goals of care with the patient's family in AM. The patient was incidentally found on CT A/P to have a 6.2cm infrarenal abdominal aortic aneurysm.  Clarify goals of care with the patient's family in AM. I tried to reach patient's listed contact, Getachew Moon at [phone], and [phone].  I obtained this family member's contact information from [facility]. The patient was incidentally found on CT A/P to have a 6.2cm infrarenal abdominal aortic aneurysm.  Clarify goals of care with the patient's family in AM regarding further workup and management. I tried to reach patient's listed contact, Getachew Moon at [phone], and [phone] and left voicemails.  I obtained this family member's contact information from [facility].

## 2021-01-01 NOTE — H&P ADULT - PROBLEM SELECTOR PLAN 6
Check B12, folate and iron studies.  Contine home dose of iron, folate, B12 The patient is not currently on hypoglycemic agents.  Check A1C.

## 2021-01-01 NOTE — H&P ADULT - PROBLEM SELECTOR PLAN 5
Pt has an appointment on 08/16/17 for ACT. Svetlana Villafana MA       Continue IV equivalent of home dose of keppra for documented history of seizures. CT identified age-indeterminate compression fracture deformity of T3 vertebral body and superior endplates of T8,T9 and T10.   Patient is currently not complaining of any back pain. If he does develop back pain, consider lidocaine patch and TLSO brace.

## 2021-01-01 NOTE — ED ADULT NURSE REASSESSMENT NOTE - NS ED NURSE REASSESS COMMENT FT1
Patient admitted to . Admission band applied to patient utilizing two patient identifiers. Patient updated on plan of care. Pt placed in position of comfort. Pt educated on call bell system and provided call bell. Bed in lowest position, wheels locked, appropriate side rails raised. Pt denies needs at this time.

## 2021-01-01 NOTE — H&P ADULT - PROBLEM SELECTOR PLAN 4
The patient is not currently on hypoglycemic agents.  Check A1C. Continue IV equivalent of home dose of keppra for documented history of seizures.

## 2021-01-01 NOTE — CHART NOTE - NSCHARTNOTEFT_GEN_A_CORE
TERTIARY TRAUMA SURVEY  ------------------------------------------------------------------------------------------    Date/Time: 21 @ 16:50  Admit date: 20  Trauma activation: consult  Admit GCS: 13 	E- 4 	V- 3 	M- 6 (verbal at baseline)     HPI:  This patient is an 84yo gentleman with PMH of tuberculosis, htn, seizures, anxiety, COPD, DM, vascular dementia, PVD, iron deficiency anemia, hypothyroidism, T2DM, vitamin D deficiency, atherosclerosis who presents to the ED for subacute hemorrhage and subdural hemorrhage. The patient is unable to provide history due to his dementia, thus history was obtained from nursing home and ED documentation from [facility]. The nurse was alerted that the patient lying on the floor next to the TV room. He was found to have a horizontal laceration in the occipital area with bleeding at the site. The patient did not complain of pain, and was able to move all extremities without limits. No change in level of consciousness. An ice pack and dressing were applied. The patient was unable to explain what happened. Dr. Crooks was made aware and the patient was transferred to The ED. He was reportedly moving all extremities spontaneously without difficulty, and at his baseline mental status. Laceration was repaired in the ED using staples. He was transferred to [facility] to continue his care.  I spoke with the patient using [employer] . The patient is not a reliable historian.  He mumbled and was difficult to hear throughout the interview. He denied headache, fever, chills but complained of neck pain with movement. He also denied chest pain, dyspnea, palpitations, N/V. He uses a walker to ambulate. He states he tripped and fell while walking outside.   I spoke with CADEN Anne from [facility], who stated that the patient typically walks with one-person assist, with wheelchair. He is usually A&Ox2. He was found conscious, on the ground.   He is unable to provide his surgical history due to his dementia.  (2021 03:05)      INTERVAL EVENTS:     PAST MEDICAL & SURGICAL HISTORY:  DM (diabetes mellitus)    Chronic obstructive pulmonary disease, unspecified COPD type    Vascular dementia    PVD (peripheral vascular disease)    History of atherosclerosis    HTN (hypertension)    Vitamin D deficiency    Seizures    T2DM (type 2 diabetes mellitus)    Seizure      [] No significant past history as reviewed with the patient and family    FAMILY HISTORY:  Patient&#x27;s mother is       [] Family history not pertinent as reviewed with the patient and family    SOCIAL HISTORY:     ALLERGIES: No Known Allergies      HOME MEDICATIONS:   Patient Currently Takes Medications as of 2021 03:55 documented in Structured Notes  · 	aspirin 81 mg oral tablet, chewable: Last Dose Taken:  , 1 tab(s) orally once a day  · 	folic acid 1 mg oral tablet: Last Dose Taken:  , 1 tab(s) orally once a day  · 	levETIRAcetam 500 mg oral tablet: Last Dose Taken:  , 1 tab(s) orally 2 times a day  · 	PARoxetine 10 mg oral tablet: Last Dose Taken:  , 1 tab(s) orally once a day  · 	Vitamin D3 1000 intl units (25 mcg) oral tablet: Last Dose Taken:  , 1 tab(s) orally once a day  · 	ferrous sulfate 325 mg (65 mg elemental iron) oral tablet: Last Dose Taken:  , 1 tab(s) orally 2 times a day  · 	Multiple Vitamins oral tablet: Last Dose Taken:  , 1 tab(s) orally once a day  · 	cyanocobalamin 1000 mcg oral tablet: Last Dose Taken:  , 1 tab(s) orally once a day    CURRENT MEDICATIONS:   MEDICATIONS (STANDING): cholecalciferol 1000 Unit(s) Oral daily  cyanocobalamin 1000 MICROGram(s) Oral daily  ferrous    sulfate 325 milliGRAM(s) Oral two times a day  folic acid 1 milliGRAM(s) Oral daily  levETIRAcetam  IVPB 500 milliGRAM(s) IV Intermittent every 12 hours  multivitamin 1 Tablet(s) Oral daily  PARoxetine 10 milliGRAM(s) Oral daily    MEDICATIONS (PRN):  ------------------------------------------------------------------------------------------    VITAL SIGNS  T(C): 36.4 (21 @ 12:03), Max: 37.2 (21 @ 02:25)  HR: 82 (21 @ 12:35) (76 - 97)  BP: 159/83 (21 @ 12:35) (125/74 - 159/83)  RR: 20 (21 @ 12:03) (18 - 24)  SpO2: 95% (21 @ 12:35) (94% - 98%)  CAPILLARY BLOOD GLUCOSE      POCT Blood Glucose.: 129 mg/dL (2021 12:51)  POCT Blood Glucose.: 105 mg/dL (2021 08:51)      INS/OUTS:     @ 07:  -   @ 16:50  --------------------------------------------------------  IN:    Oral Fluid: 240 mL  Total IN: 240 mL    OUT:  Total OUT: 0 mL    Total NET: 240 mL        Drug Dosing Weight  Height (cm): 172.7 (31 Dec 2020 22:03)  Weight (kg): 70.3 (31 Dec 2020 22:03)  BMI (kg/m2): 23.6 (31 Dec 2020 22:03)  BSA (m2): 1.83 (31 Dec 2020 22:03)    PHYSICAL EXAM:  ***  General: NAD, Sitting in bed comfortably  HEENT: NC/AT, EOMI  Neck: Soft, supple  Cardio: RRR, nml S1/S2  Resp: Good effort, CTA b/l  Thorax: No chest wall tenderness  Breast: No lesions/masses, no drainage  GI/Abd: Soft, NT/ND, no rebound/guarding, no masses palpated  Vascular: Extremities warm, brisk cap refill, B/l radial pulses palpable, b/l DP/PT palpable, no palpable abdominal pulsatile mass  Skin: Intact, no breakdown  Lymphatic/Nodes: No palpable lymphadenopathy  Musculoskeletal: All 4 extremities moving spontaneously, no limitations  ------------------------------------------------------------------------------------------    LABS  CBC ( @ 07:14)                              9.8<L>                         5.18    )----------------(  135<L>     --    % Neutrophils, --    % Lymphocytes, ANC: --                                  31.1<L>  CBC ( @ 22:23)                              10.0<L>                         5.35    )----------------(  140<L>     47.0  % Neutrophils, 31.6  % Lymphocytes, ANC: 2.51                                30.5<L>    BMP ( @ 07:13)             138     |  101     |  27<H> 		Ca++ --      Ca 10.0               ---------------------------------( 100<H>		Mg --                 3.9     |  27      |  1.02  			Ph --      BMP ( @ 22:23)             138     |  101     |  31<H> 		Ca++ --      Ca 9.7                ---------------------------------( 115<H>		Mg --                 4.2     |  27      |  1.24  			Ph --        LFTs ( @ 22:23)      TPro 7.1 / Alb 4.3 / TBili 0.3 / DBili -- / AST 17 / ALT 9<L> / AlkPhos 63    Coags ( @ 22:23)  aPTT 28.2 / INR 1.17<H> / PT 13.9<H>    Cardiac Markers ( 22:23)     HSTrop: -- / CKMB: -- / CK: 70          MICROBIOLOGY      ------------------------------------------------------------------------------------------    RADIOLOGICAL FINDINGS REVIEW:   CXR:   EXAM:  XR CHEST AP OR PA 1V                          PROCEDURE DATE:  2020      INTERPRETATION:  CLINICAL INFORMATION: Fall with chest pain.    EXAM: AP chest radiograph.    COMPARISON: None.    FINDINGS:  The heart size is normal.    The lungs are clear. No pleural effusion or pneumothorax.    Degenerative changes of the spine.    IMPRESSION:  Clear lungs.    Pelvis Films:   C-Spine Films:   T/L/S Spine Films:   Extremity Films:   Head CT:   EXAM:  CT BRAIN                          PROCEDURE DATE:  2020      INTERPRETATION:  CLINICAL INFORMATION: History of subarachnoid hemorrhage. Repeat head CT.    TECHNIQUE: Noncontrast axial CT images were acquired through the head. Two-dimensional sagittal and coronal reformats were generated.    COMPARISON STUDY: CT of the head from earlier the same day at [facility] at 6:44 PM.    FINDINGS:  Right frontal subarachnoid hemorrhage measuring 0.7 x 1.6 x 1.0 cm, not significantly changed. Subarachnoid hemorrhage in the quadrigeminal plate cistern, not significantly changed. 3 mm subdural hematoma along the posterior falx, not significantly changed. No midline shift or hydrocephalus    Redemonstration of gliosis and encephalomalacia in the inferior left frontal lobe and to a lesser extent in the inferior right frontal lobe which could be a sequela of prior trauma or infarction. There is cerebral volume loss with secondary prominence the ventricles and sulci. Thereis patchy white matter hypoattenuation, likely related to chronic microvascular changes. No acute territorial infarct.    The visualized paranasal sinuses and mastoid air cells are clear. The calvarium is intact. Sequela of bilateral lens surgery. Surgical skin staples in the midline and right parieto-occipital scalp.    IMPRESSION:  Right frontal subarachnoid hemorrhage, quadrigeminal plate cistern subarachnoid hemorrhage and small subdural hematoma along the posterior falx, not significantly changed.    C-Spine CT:   Neck CT:   Chest /ABD/Pelvis CT:   EXAM:  CT ABDOMEN AND PELVIS IC                          EXAM:  CT CHEST IC                          PROCEDURE DATE:  2020      INTERPRETATION:  CLINICAL INFORMATION: Trauma, status post fall.    COMPARISON: None.    PROCEDURE:  CT ofthe Chest, Abdomen and Pelvis was performed with intravenous contrast.  Imaging was performed through the chest in the arterial phase followed by imaging of the abdomen and pelvis in the portal venous phase.  Intravenous contrast: 90 ml Omnipaque 350. 10 ml discarded.  Oral contrast: None.  Sagittal and coronal reformats were performed.    FINDINGS:  CHEST:  LUNGS: Patent central airways. No pulmonary nodules, masses or consolidation. Biapical scarring. Lingular subsegmental atelectasis. Left upper and lower lobe calcified granuloma.  PLEURA: No pleural effusion.  VESSELS: No central pulmonary embolism. No aortic dissection or evidence of aortic traumatic injury.  HEART: Heart size is normal. No pericardial effusion. Coronary artery calcifications.  MEDIASTINUM AND FUAD: No lymphadenopathy.  CHEST WALL AND LOWER NECK: Within normal limits.  BONES: Old fracture deformity of the left scapula. Age-indeterminate compression fracture deformity of the T3 vertebral body and superior endplates ofthe T8, T9 and T10 vertebral bodies resulting in less than 50% loss of vertebral body height.    ABDOMEN AND PELVIS:  LIVER: Within normal limits.  BILE DUCTS: Normal caliber.  GALLBLADDER: Within normal limits.  SPLEEN: Within normal limits.  PANCREAS: Within normal limits.  ADRENALS: Within normal limits.  KIDNEYS/URETERS: Bilateral renal subcentimeter hypodense lesions which are too small for accurate characterization. No hydronephrosis.    BLADDER: Within normal limits.  REPRODUCTIVE ORGANS: Prostate within normal limits.    BOWEL: Scattered colonic diverticulosis without diverticulitis. No bowel obstruction or inflammation. Appendix is normal. Metal stool throughout the colon.  PERITONEUM: No ascites.  VESSELS: Atherosclerotic changes. 6.2 cm infrarenal abdominal aortic aneurysm. No intra-abdominal hemorrhage.  RETROPERITONEUM/LYMPH NODES: No lymphadenopathy.  ABDOMINAL WALL: Small fat-containing umbilical hernia.  BONES: Degenerative changes of the spine. 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    IMPRESSION:  1.  No evidence of acute intrathoracic, intra-abdominal or intrapelvic trauma.  2.  Infrarenal abdominal aortic aneurysm measuring up to 6.2 cm.  3.  Age indeterminate compression fracture deformities of the T3, T8, T9 and T10 vertebral bodies.     Other:     List Injuries Identified to Date:   Subdural hematoma        List Operative and Interventional Radiological Procedures:  none      Consults (Date):    [x] Neurosurgery -    [] Orthopedic Surgery  [] Spine Surgery  [] Plastic Surgery  [] ENT  [] Urology  [] PM&R  [] Social Work    INTERPRETATION: TERTIARY TRAUMA SURVEY  ------------------------------------------------------------------------------------------    Date/Time: 21 @ 16:50  Admit date: 20  Trauma activation: consult  Admit GCS: 13 	E- 4 	V- 3 	M- 6 (verbal at baseline)     HPI:  This patient is an 86yo gentleman with PMH of tuberculosis, htn, seizures, anxiety, COPD, DM, vascular dementia, PVD, iron deficiency anemia, hypothyroidism, T2DM, vitamin D deficiency, atherosclerosis who presents to the ED for subacute hemorrhage and subdural hemorrhage. The patient is unable to provide history due to his dementia, thus history was obtained from nursing home and ED documentation from [facility]. The nurse was alerted that the patient lying on the floor next to the TV room. He was found to have a horizontal laceration in the occipital area with bleeding at the site. The patient did not complain of pain, and was able to move all extremities without limits. No change in level of consciousness. An ice pack and dressing were applied. The patient was unable to explain what happened. Dr. Crooks was made aware and the patient was transferred to The ED. He was reportedly moving all extremities spontaneously without difficulty, and at his baseline mental status. Laceration was repaired in the ED using staples. He was transferred to [facility] to continue his care.  I spoke with the patient using [employer] . The patient is not a reliable historian.  He mumbled and was difficult to hear throughout the interview. He denied headache, fever, chills but complained of neck pain with movement. He also denied chest pain, dyspnea, palpitations, N/V. He uses a walker to ambulate. He states he tripped and fell while walking outside.   I spoke with CADEN Anne from [facility], who stated that the patient typically walks with one-person assist, with wheelchair. He is usually A&Ox2. He was found conscious, on the ground.   He is unable to provide his surgical history due to his dementia.  (2021 03:05)      INTERVAL EVENTS:     PAST MEDICAL & SURGICAL HISTORY:  DM (diabetes mellitus)    Chronic obstructive pulmonary disease, unspecified COPD type    Vascular dementia    PVD (peripheral vascular disease)    History of atherosclerosis    HTN (hypertension)    Vitamin D deficiency    Seizures    T2DM (type 2 diabetes mellitus)    Seizure      [] No significant past history as reviewed with the patient and family    FAMILY HISTORY:  Patient&#x27;s mother is       [] Family history not pertinent as reviewed with the patient and family    SOCIAL HISTORY:     ALLERGIES: No Known Allergies      HOME MEDICATIONS:   Patient Currently Takes Medications as of 2021 03:55 documented in Structured Notes  · 	aspirin 81 mg oral tablet, chewable: Last Dose Taken:  , 1 tab(s) orally once a day  · 	folic acid 1 mg oral tablet: Last Dose Taken:  , 1 tab(s) orally once a day  · 	levETIRAcetam 500 mg oral tablet: Last Dose Taken:  , 1 tab(s) orally 2 times a day  · 	PARoxetine 10 mg oral tablet: Last Dose Taken:  , 1 tab(s) orally once a day  · 	Vitamin D3 1000 intl units (25 mcg) oral tablet: Last Dose Taken:  , 1 tab(s) orally once a day  · 	ferrous sulfate 325 mg (65 mg elemental iron) oral tablet: Last Dose Taken:  , 1 tab(s) orally 2 times a day  · 	Multiple Vitamins oral tablet: Last Dose Taken:  , 1 tab(s) orally once a day  · 	cyanocobalamin 1000 mcg oral tablet: Last Dose Taken:  , 1 tab(s) orally once a day    CURRENT MEDICATIONS:   MEDICATIONS (STANDING): cholecalciferol 1000 Unit(s) Oral daily  cyanocobalamin 1000 MICROGram(s) Oral daily  ferrous    sulfate 325 milliGRAM(s) Oral two times a day  folic acid 1 milliGRAM(s) Oral daily  levETIRAcetam  IVPB 500 milliGRAM(s) IV Intermittent every 12 hours  multivitamin 1 Tablet(s) Oral daily  PARoxetine 10 milliGRAM(s) Oral daily    MEDICATIONS (PRN):  ------------------------------------------------------------------------------------------    VITAL SIGNS  T(C): 36.4 (21 @ 12:03), Max: 37.2 (21 @ 02:25)  HR: 82 (21 @ 12:35) (76 - 97)  BP: 159/83 (21 @ 12:35) (125/74 - 159/83)  RR: 20 (21 @ 12:03) (18 - 24)  SpO2: 95% (21 @ 12:35) (94% - 98%)  CAPILLARY BLOOD GLUCOSE      POCT Blood Glucose.: 129 mg/dL (2021 12:51)  POCT Blood Glucose.: 105 mg/dL (2021 08:51)      INS/OUTS:     @ 07:  -   @ 16:50  --------------------------------------------------------  IN:    Oral Fluid: 240 mL  Total IN: 240 mL    OUT:  Total OUT: 0 mL    Total NET: 240 mL        Drug Dosing Weight  Height (cm): 172.7 (31 Dec 2020 22:03)  Weight (kg): 70.3 (31 Dec 2020 22:03)  BMI (kg/m2): 23.6 (31 Dec 2020 22:03)  BSA (m2): 1.83 (31 Dec 2020 22:03)    PHYSICAL EXAM:   General: NAD, Sitting in bed comfortably  HEENT: Posterior scalp laceration, EOMI  Neck: Soft, supple, full painless ROM  Cardio: RRR, nml S1/S2  Resp: Good effort, CTA b/l  Thorax: No chest wall tenderness  GI/Abd: Soft, NT/ND, no rebound/guarding, no masses palpated  Vascular: Extremities warm, brisk cap refill, B/l radial pulses palpable, b/l DP/PT palpable  Skin: Intact, no breakdown  Lymphatic/Nodes: No palpable lymphadenopathy  Musculoskeletal: All 4 extremities moving spontaneously, no limitations  ------------------------------------------------------------------------------------------    LABS  CBC ( @ 07:14)                              9.8<L>                         5.18    )----------------(  135<L>     --    % Neutrophils, --    % Lymphocytes, ANC: --                                  31.1<L>  CBC ( @ 22:23)                              10.0<L>                         5.35    )----------------(  140<L>     47.0  % Neutrophils, 31.6  % Lymphocytes, ANC: 2.51                                30.5<L>    BMP ( @ 07:13)             138     |  101     |  27<H> 		Ca++ --      Ca 10.0               ---------------------------------( 100<H>		Mg --                 3.9     |  27      |  1.02  			Ph --      BMP ( @ 22:23)             138     |  101     |  31<H> 		Ca++ --      Ca 9.7                ---------------------------------( 115<H>		Mg --                 4.2     |  27      |  1.24  			Ph --        LFTs ( @ 22:23)      TPro 7.1 / Alb 4.3 / TBili 0.3 / DBili -- / AST 17 / ALT 9<L> / AlkPhos 63    Coags ( @ 22:23)  aPTT 28.2 / INR 1.17<H> / PT 13.9<H>    Cardiac Markers ( 22:23)     HSTrop: -- / CKMB: -- / CK: 70          MICROBIOLOGY      ------------------------------------------------------------------------------------------    RADIOLOGICAL FINDINGS REVIEW:   CXR:   EXAM:  XR CHEST AP OR PA 1V                          PROCEDURE DATE:  2020      INTERPRETATION:  CLINICAL INFORMATION: Fall with chest pain.    EXAM: AP chest radiograph.    COMPARISON: None.    FINDINGS:  The heart size is normal.    The lungs are clear. No pleural effusion or pneumothorax.    Degenerative changes of the spine.    IMPRESSION:  Clear lungs.    Pelvis Films:   C-Spine Films:   T/L/S Spine Films:   Extremity Films:   Head CT:   EXAM:  CT BRAIN                          PROCEDURE DATE:  2020      INTERPRETATION:  CLINICAL INFORMATION: History of subarachnoid hemorrhage. Repeat head CT.    TECHNIQUE: Noncontrast axial CT images were acquired through the head. Two-dimensional sagittal and coronal reformats were generated.    COMPARISON STUDY: CT of the head from earlier the same day at [facility] at 6:44 PM.    FINDINGS:  Right frontal subarachnoid hemorrhage measuring 0.7 x 1.6 x 1.0 cm, not significantly changed. Subarachnoid hemorrhage in the quadrigeminal plate cistern, not significantly changed. 3 mm subdural hematoma along the posterior falx, not significantly changed. No midline shift or hydrocephalus    Redemonstration of gliosis and encephalomalacia in the inferior left frontal lobe and to a lesser extent in the inferior right frontal lobe which could be a sequela of prior trauma or infarction. There is cerebral volume loss with secondary prominence the ventricles and sulci. Thereis patchy white matter hypoattenuation, likely related to chronic microvascular changes. No acute territorial infarct.    The visualized paranasal sinuses and mastoid air cells are clear. The calvarium is intact. Sequela of bilateral lens surgery. Surgical skin staples in the midline and right parieto-occipital scalp.    IMPRESSION:  Right frontal subarachnoid hemorrhage, quadrigeminal plate cistern subarachnoid hemorrhage and small subdural hematoma along the posterior falx, not significantly changed.    C-Spine CT:   Neck CT:   Chest /ABD/Pelvis CT:   EXAM:  CT ABDOMEN AND PELVIS IC                          EXAM:  CT CHEST IC                          PROCEDURE DATE:  2020      INTERPRETATION:  CLINICAL INFORMATION: Trauma, status post fall.    COMPARISON: None.    PROCEDURE:  CT ofthe Chest, Abdomen and Pelvis was performed with intravenous contrast.  Imaging was performed through the chest in the arterial phase followed by imaging of the abdomen and pelvis in the portal venous phase.  Intravenous contrast: 90 ml Omnipaque 350. 10 ml discarded.  Oral contrast: None.  Sagittal and coronal reformats were performed.    FINDINGS:  CHEST:  LUNGS: Patent central airways. No pulmonary nodules, masses or consolidation. Biapical scarring. Lingular subsegmental atelectasis. Left upper and lower lobe calcified granuloma.  PLEURA: No pleural effusion.  VESSELS: No central pulmonary embolism. No aortic dissection or evidence of aortic traumatic injury.  HEART: Heart size is normal. No pericardial effusion. Coronary artery calcifications.  MEDIASTINUM AND FUAD: No lymphadenopathy.  CHEST WALL AND LOWER NECK: Within normal limits.  BONES: Old fracture deformity of the left scapula. Age-indeterminate compression fracture deformity of the T3 vertebral body and superior endplates ofthe T8, T9 and T10 vertebral bodies resulting in less than 50% loss of vertebral body height.    ABDOMEN AND PELVIS:  LIVER: Within normal limits.  BILE DUCTS: Normal caliber.  GALLBLADDER: Within normal limits.  SPLEEN: Within normal limits.  PANCREAS: Within normal limits.  ADRENALS: Within normal limits.  KIDNEYS/URETERS: Bilateral renal subcentimeter hypodense lesions which are too small for accurate characterization. No hydronephrosis.    BLADDER: Within normal limits.  REPRODUCTIVE ORGANS: Prostate within normal limits.    BOWEL: Scattered colonic diverticulosis without diverticulitis. No bowel obstruction or inflammation. Appendix is normal. Metal stool throughout the colon.  PERITONEUM: No ascites.  VESSELS: Atherosclerotic changes. 6.2 cm infrarenal abdominal aortic aneurysm. No intra-abdominal hemorrhage.  RETROPERITONEUM/LYMPH NODES: No lymphadenopathy.  ABDOMINAL WALL: Small fat-containing umbilical hernia.  BONES: Degenerative changes of the spine. 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    IMPRESSION:  1.  No evidence of acute intrathoracic, intra-abdominal or intrapelvic trauma.  2.  Infrarenal abdominal aortic aneurysm measuring up to 6.2 cm.  3.  Age indeterminate compression fracture deformities of the T3, T8, T9 and T10 vertebral bodies.     Other:     List Injuries Identified to Date:   Subdural hematoma, posterior scalp laceration        List Operative and Interventional Radiological Procedures:  none      Consults (Date):    [x] Neurosurgery -    [] Orthopedic Surgery  [] Spine Surgery  [] Plastic Surgery  [] ENT  [] Urology  [] PM&R  [] Social Work    INTERPRETATION:    Mr. Yang is a Cuban speaking 85 year old gentleman with a PMH of HTN, seizures, anxiety, COPD, DM, dementia (basleine A&Ox1 per chart) who was BIB EMS from [facility] to [place] after he was found down in his room with a 5cm laceration to the posterior skull and found to have small SAH/SDH.    Plan:  - CT head stable, NSG recommends 24hr CT  - CT chest/abdomen/pelvis shows no acute pathology, age indeterminate T3/8/9/10 fx, of which at least T3/8/9 were noted in 2016 and 2018  - defer to NSG for management of thoracic spine fx  - known AAA 6.2cm, up from 5.2 , 4.6 ; can consider GOC vs workup given DNR status  - No further trauma surgery intervention at this time. If further trauma surgery needs arise, feel free to reconsult trauma surgery    ACS [id number]

## 2021-01-01 NOTE — H&P ADULT - PROBLEM SELECTOR PLAN 9
VTE ppx: venodyne boots  Activity: bedrest for now   Diet: swallow eval, then soft food, DASH  PT and OT evaluation

## 2021-01-01 NOTE — H&P ADULT - NSHPSOCIALHISTORY_GEN_ALL_CORE
The patient is a former smoker.  He is The patient is a former smoker.  The patient lives at [facility].  He uses a walker to ambulate.

## 2021-01-01 NOTE — H&P ADULT - NSHPREVIEWOFSYSTEMS_GEN_ALL_CORE
Family= Getachew [place]  Niece  next of kin- [phone] REVIEW OF SYSTEMS  CONSTITUTIONAL: No fever, no chills, + fatigue  EYES: No eye pain, no vision changes  ENMT:  No difficulty hearing, no throat pain  RESPIRATORY: + cough, no sputum production; No shortness of breath  CARDIOVASCULAR: No chest pain, no palpitations, no leg swelling  GASTROINTESTINAL: No abdominal pain, no nausea, no vomiting,   GENITOURINARY: No dysuria, no hematuria  NEUROLOGICAL: No headaches, + neck pain  SKIN: No itching, no rashes, no lesions   MUSCULOSKELETAL: No joint pain, no joint swelling; No muscle pain  HEME/LYMPH: No easy bruising, bleeding

## 2021-01-01 NOTE — H&P ADULT - ATTENDING COMMENTS
Patient assigned to me by night hospitalist in charge for management and care for patient for this evening only. Care to be continued by day hospitalist in the morning and thereafter.  Susan Terry MD [phone] Patient assigned to me by night hospitalist in charge for management and care for patient for this evening only. Care to be continued by day hospitalist in the morning and thereafter.  Susan Terry MD [phone]    * Patient had documented history of hypothyroidism in [facility] notes, but is not currently on levothyroxine. TSH ordered for AM labs.

## 2021-01-01 NOTE — OCCUPATIONAL THERAPY INITIAL EVALUATION ADULT - ANTICIPATED DISCHARGE DISPOSITION, OT EVAL
Return to [facility] with Home OT services and increase in assistance for ADLs and functional mobility./home w/ level of assist

## 2021-01-02 LAB
ANION GAP SERPL CALC-SCNC: 9 MMOL/L — SIGNIFICANT CHANGE UP (ref 5–17)
BUN SERPL-MCNC: 28 MG/DL — HIGH (ref 7–23)
CALCIUM SERPL-MCNC: 9.7 MG/DL — SIGNIFICANT CHANGE UP (ref 8.4–10.5)
CHLORIDE SERPL-SCNC: 103 MMOL/L — SIGNIFICANT CHANGE UP (ref 96–108)
CO2 SERPL-SCNC: 28 MMOL/L — SIGNIFICANT CHANGE UP (ref 22–31)
CREAT SERPL-MCNC: 1.03 MG/DL — SIGNIFICANT CHANGE UP (ref 0.5–1.3)
GLUCOSE SERPL-MCNC: 103 MG/DL — HIGH (ref 70–99)
HCT VFR BLD CALC: 30.1 % — LOW (ref 39–50)
HGB BLD-MCNC: 9.6 G/DL — LOW (ref 13–17)
MCHC RBC-ENTMCNC: 31.9 GM/DL — LOW (ref 32–36)
MCHC RBC-ENTMCNC: 34.4 PG — HIGH (ref 27–34)
MCV RBC AUTO: 107.9 FL — HIGH (ref 80–100)
NRBC # BLD: 0 /100 WBCS — SIGNIFICANT CHANGE UP (ref 0–0)
PLATELET # BLD AUTO: 129 K/UL — LOW (ref 150–400)
POTASSIUM SERPL-MCNC: 4.3 MMOL/L — SIGNIFICANT CHANGE UP (ref 3.5–5.3)
POTASSIUM SERPL-SCNC: 4.3 MMOL/L — SIGNIFICANT CHANGE UP (ref 3.5–5.3)
RBC # BLD: 2.79 M/UL — LOW (ref 4.2–5.8)
RBC # FLD: 13.3 % — SIGNIFICANT CHANGE UP (ref 10.3–14.5)
SODIUM SERPL-SCNC: 140 MMOL/L — SIGNIFICANT CHANGE UP (ref 135–145)
WBC # BLD: 5.4 K/UL — SIGNIFICANT CHANGE UP (ref 3.8–10.5)
WBC # FLD AUTO: 5.4 K/UL — SIGNIFICANT CHANGE UP (ref 3.8–10.5)

## 2021-01-02 PROCEDURE — 99233 SBSQ HOSP IP/OBS HIGH 50: CPT

## 2021-01-02 RX ADMIN — LEVETIRACETAM 400 MILLIGRAM(S): 250 TABLET, FILM COATED ORAL at 18:01

## 2021-01-02 RX ADMIN — Medication 325 MILLIGRAM(S): at 18:01

## 2021-01-02 RX ADMIN — Medication 1 MILLIGRAM(S): at 12:55

## 2021-01-02 RX ADMIN — PREGABALIN 1000 MICROGRAM(S): 225 CAPSULE ORAL at 12:56

## 2021-01-02 RX ADMIN — LEVETIRACETAM 400 MILLIGRAM(S): 250 TABLET, FILM COATED ORAL at 05:11

## 2021-01-02 RX ADMIN — Medication 1000 UNIT(S): at 12:55

## 2021-01-02 RX ADMIN — Medication 325 MILLIGRAM(S): at 05:14

## 2021-01-02 RX ADMIN — Medication 1 TABLET(S): at 12:56

## 2021-01-02 RX ADMIN — Medication 10 MILLIGRAM(S): at 12:56

## 2021-01-02 NOTE — PATIENT PROFILE ADULT - NSPROGENSOURCEINFO_GEN_A_NUR
patient Complex Repair And Xenograft Text: The defect edges were debeveled with a #15 scalpel blade.  The primary defect was closed partially with a complex linear closure.  Given the location of the defect, shape of the defect and the proximity to free margins a xenograft was deemed most appropriate to repair the remaining defect.  The graft was trimmed to fit the size of the remaining defect.  The graft was then placed in the primary defect, oriented appropriately, and sutured into place.

## 2021-01-03 LAB — SARS-COV-2 RNA SPEC QL NAA+PROBE: SIGNIFICANT CHANGE UP

## 2021-01-03 PROCEDURE — 99232 SBSQ HOSP IP/OBS MODERATE 35: CPT

## 2021-01-03 RX ADMIN — LEVETIRACETAM 400 MILLIGRAM(S): 250 TABLET, FILM COATED ORAL at 05:44

## 2021-01-03 RX ADMIN — Medication 1000 UNIT(S): at 12:35

## 2021-01-03 RX ADMIN — Medication 1 TABLET(S): at 12:34

## 2021-01-03 RX ADMIN — Medication 10 MILLIGRAM(S): at 12:34

## 2021-01-03 RX ADMIN — PREGABALIN 1000 MICROGRAM(S): 225 CAPSULE ORAL at 12:34

## 2021-01-03 RX ADMIN — LEVETIRACETAM 400 MILLIGRAM(S): 250 TABLET, FILM COATED ORAL at 17:39

## 2021-01-03 RX ADMIN — Medication 325 MILLIGRAM(S): at 05:44

## 2021-01-03 RX ADMIN — Medication 325 MILLIGRAM(S): at 17:40

## 2021-01-03 RX ADMIN — Medication 1 MILLIGRAM(S): at 12:35

## 2021-01-03 NOTE — PROGRESS NOTE ADULT - PROBLEM SELECTOR PLAN 6
The patient is not currently on hypoglycemic agents.  Check A1C.

## 2021-01-03 NOTE — PROGRESS NOTE ADULT - ATTENDING COMMENTS
Thania Manrique DO  [facility]  [phone]
Thania Manrique DO  hospitalist  [phone]    dispo planning
Thania Manrique DO  Hospitalist  [phone]    Dispo: DC planning back to [facility].

## 2021-01-03 NOTE — PROGRESS NOTE ADULT - PROBLEM SELECTOR PLAN 7
Check B12, folate and iron studies.  Continue home dose of iron, folate, B12

## 2021-01-03 NOTE — PROGRESS NOTE ADULT - PROBLEM SELECTOR PLAN 1
On repeat CTH, the right frontal SAH, quadrigeminal plate cistern SAH and small subdural hematoma along the posterior falx were not significantly changed.   Obtain 24 hour CTH at 24hrs is stable- per neurosurgery's last note if stable OK for DC.     Hold home dose of baby aspirin.  Patient received 1u platelets at [facility].  As per neurosurgical team, the patient is not planned for surgical intervention at this time.  Continue q4h neuro checks.   Started on seizure prophylaxis Continue keppra 500mg BID  (on for seizure hx as well)  SBP goal < 160mmHg
On repeat CTH, the right frontal SAH, quadrigeminal plate cistern SAH and small subdural hematoma along the posterior falx were not significantly changed.   Obtain 24 hour CTH at 24hrs is stable- per neurosurgery's last note if stable OK for DC.     Hold home dose of baby aspirin.  Patient received 1u platelets at [facility].  As per neurosurgical team, the patient is not planned for surgical intervention at this time.  Continue q4h neuro checks.   Started on seizure prophylaxis Continue keppra 500mg BID  (on for seizure hx as well)  SBP goal < 160mmHg
On repeat CTH, the right frontal SAH, quadrigeminal plate cistern SAH and small subdural hematoma along the posterior falx were not significantly changed.   Obtain 24 hour CTH at 22:00 tonight (ordered)  Hold home dose of baby aspirin.  Patient received 1u platelets at [facility].  As per neurosurgical team, the patient is not planned for surgical intervention at this time.  Continue q4h neuro checks.   Started on seizure prophylaxis Continue keppra 500mg BID.  Maintain SBP< 160

## 2021-01-03 NOTE — PROGRESS NOTE ADULT - PROBLEM SELECTOR PLAN 5
CT identified age-indeterminate compression fracture deformity of T3 vertebral body and superior endplates of T8,T9 and T10.   Patient is currently not complaining of any back pain. If he does develop back pain, would do lidocaine patch and TLSO brace.
CT identified age-indeterminate compression fracture deformity of T3 vertebral body and superior endplates of T8,T9 and T10.   Patient is currently not complaining of any back pain. If he does develop back pain, consider lidocaine patch and TLSO brace.
CT identified age-indeterminate compression fracture deformity of T3 vertebral body and superior endplates of T8,T9 and T10.   Patient is currently not complaining of any back pain. If he does develop back pain, would do lidocaine patch and TLSO brace.

## 2021-01-03 NOTE — PROGRESS NOTE ADULT - PROBLEM SELECTOR PLAN 3
The patient was incidentally found on CT A/P to have a 6.2cm infrarenal abdominal aortic aneurysm.  Discussed this with sister Candelaria 1/1 3PM and she agrees to f/u as outpt no plan for surgical intervention, this was noted to be 5cm in 2018 will monitor

## 2021-01-03 NOTE — PROGRESS NOTE ADULT - REASON FOR ADMISSION
subarachnoid hemorrhage, subdural hemorrhage

## 2021-01-03 NOTE — PROGRESS NOTE ADULT - ASSESSMENT
This patient is an 84yo gentleman with PMH of tuberculosis, htn, seizures, anxiety, COPD, DM, vascular dementia, PVD, iron deficiency anemia, hypothyroidism, T2DM, vitamin D deficiency, atherosclerosis who presents to after being found down at [facility], with occipital laceration, found to have SAH and SDH at [facility], transferred to [facility] for continued care.   
This patient is an 86yo gentleman with PMH of tuberculosis, htn, seizures, anxiety, COPD, DM, vascular dementia, PVD, iron deficiency anemia, hypothyroidism, T2DM, vitamin D deficiency, atherosclerosis who presents to after being found down at [facility], with occipital laceration, found to have SAH and SDH at [facility], transferred to [facility] for continued care.   
Mr. Yang is a Slovenian speaking 85 year old gentleman with a PMH of HTN, seizures, anxiety, COPD, DM, dementia (basleine A&Ox1 per chart) who was BIB EMS from [facility] to [place] after he was found down in his room with a 5cm laceration to the posterior skull and found to have small SAH/SDH.    Plan:  - CT head stable, NSG recommends 24hr CT  - CT chest/abdomen/pelvis shows no acute pathology, age indeterminate T3/8/9/10 fx, of which at least T3/8/9 were noted in 2016 and 2018  - defer to NSG for management of thoracic fx  - known AAA 6.2cm, up from 5.2 2018, 4.6 2016; can consider GOC vs workup given DNR status  - tertiary survey today  - dispo per NSG    ACS [id number]
This patient is an 86yo gentleman with PMH of tuberculosis, htn, seizures, anxiety, COPD, DM, vascular dementia, PVD, iron deficiency anemia, hypothyroidism, T2DM, vitamin D deficiency, atherosclerosis who presents to after being found down at [facility], with occipital laceration, found to have SAH and SDH at [facility], transferred to [facility] for continued care.

## 2021-01-03 NOTE — PROGRESS NOTE ADULT - PROBLEM SELECTOR PLAN 9
VTE ppx: venodyne boots  Diet: swallow eval, then soft food, DASH  PT and OT evaluation rec back to [facility] w/ services  Staples will need to be removed in 2 weeks 1/13/21- placed at OSH no records in our system
VTE ppx: venodyne boots  Diet: swallow eval, then soft food, DASH  PT and OT evaluation  Staples will need to be removed in 2 weeks 1/13/21- placed at OSH no records in our system
VTE ppx: venodyne boots  Diet: swallow eval, then soft food, DASH  PT and OT evaluation rec back to [facility] w/ services  Staples will need to be removed in 2 weeks 1/13/21- placed at OSH no records in our system

## 2021-01-03 NOTE — PROGRESS NOTE ADULT - SUBJECTIVE AND OBJECTIVE BOX
General Surgery Progress Note    S: Patient seen and examined at bedside. No events overnight. Pain well controlled. Denies chest pain, shortness of breath, nausea/vomiting    Physical Exam:  General: No acute distress, pleasantly disoriented at baseline  Respiratory: Nonlabored, no use of accessory muscles  Abdominal: Soft, nondistended, nontender. No rebound or guarding.   Extremities: WWP, moving all extremities spontaneously      PAST MEDICAL HISTORY:  DM (diabetes mellitus)    Chronic obstructive pulmonary disease, unspecified COPD type    Vascular dementia    PVD (peripheral vascular disease)    History of atherosclerosis    HTN (hypertension)    Vitamin D deficiency    Seizures    T2DM (type 2 diabetes mellitus)    Seizure          MEDICATIONS:  cholecalciferol 1000 Unit(s) Oral daily  cyanocobalamin 1000 MICROGram(s) Oral daily  ferrous    sulfate 325 milliGRAM(s) Oral two times a day  folic acid 1 milliGRAM(s) Oral daily  levETIRAcetam  IVPB 500 milliGRAM(s) IV Intermittent every 12 hours  multivitamin 1 Tablet(s) Oral daily  PARoxetine 10 milliGRAM(s) Oral daily      ALLERGIES:  No Known Allergies      VITALS & I/Os:  Vital Signs Last 24 Hrs  T(C): 37 (01 Jan 2021 05:41), Max: 37.2 (01 Jan 2021 02:25)  T(F): 98.6 (01 Jan 2021 05:41), Max: 98.9 (01 Jan 2021 02:25)  HR: 88 (01 Jan 2021 05:41) (84 - 97)  BP: 144/78 (01 Jan 2021 05:41) (125/74 - 155/80)  BP(mean): 89 (01 Jan 2021 04:33) (89 - 91)  RR: 20 (01 Jan 2021 05:41) (18 - 24)  SpO2: 94% (01 Jan 2021 05:41) (94% - 98%)    I&O's Summary        LABS:                        9.8    5.18  )-----------( 135      ( 01 Jan 2021 07:14 )             31.1     01-01    138  |  101  |  27<H>  ----------------------------<  100<H>  3.9   |  27  |  1.02    Ca    10.0      01 Jan 2021 07:13    TPro  7.1  /  Alb  4.3  /  TBili  0.3  /  DBili  x   /  AST  17  /  ALT  9<L>  /  AlkPhos  63  12-31    Lactate:    PT/INR - ( 31 Dec 2020 22:23 )   PT: 13.9 sec;   INR: 1.17 ratio         PTT - ( 31 Dec 2020 22:23 )  PTT:28.2 sec    CARDIAC MARKERS ( 31 Dec 2020 22:23 )  x     / x     / 70 U/L / x     / 1.1 ng/mL            
PROGRESS NOTE:     Patient is a 85y old  Male who presents with a chief complaint of subarachnoid hemorrhage, subdural hemorrhage (01 Jan 2021 10:55)      SUBJECTIVE / OVERNIGHT EVENTS: RENAY    ADDITIONAL REVIEW OF SYSTEMS:  not able to obtain 2/2 dementia     MEDICATIONS  (STANDING):  cholecalciferol 1000 Unit(s) Oral daily  cyanocobalamin 1000 MICROGram(s) Oral daily  ferrous    sulfate 325 milliGRAM(s) Oral two times a day  folic acid 1 milliGRAM(s) Oral daily  levETIRAcetam  IVPB 500 milliGRAM(s) IV Intermittent every 12 hours  multivitamin 1 Tablet(s) Oral daily  PARoxetine 10 milliGRAM(s) Oral daily    MEDICATIONS  (PRN):      CAPILLARY BLOOD GLUCOSE      POCT Blood Glucose.: 129 mg/dL (01 Jan 2021 12:51)  POCT Blood Glucose.: 105 mg/dL (01 Jan 2021 08:51)    I&O's Summary      PHYSICAL EXAM:  Vital Signs Last 24 Hrs  T(C): 36.4 (01 Jan 2021 12:03), Max: 37.2 (01 Jan 2021 02:25)  T(F): 97.6 (01 Jan 2021 12:03), Max: 98.9 (01 Jan 2021 02:25)  HR: 82 (01 Jan 2021 12:35) (76 - 97)  BP: 159/83 (01 Jan 2021 12:35) (125/74 - 159/83)  BP(mean): 89 (01 Jan 2021 04:33) (89 - 91)  RR: 20 (01 Jan 2021 12:03) (18 - 24)  SpO2: 95% (01 Jan 2021 12:35) (94% - 98%)    CONSTITUTIONAL: NAD, thin, frail cachectic   RESPIRATORY: Normal respiratory effort; lungs are clear to auscultation bilaterally  CARDIOVASCULAR: Regular rate and rhythm, normal S1 and S2, no murmur/rub/gallop; No lower extremity edema; Peripheral pulses are 2+ bilaterally  ABDOMEN: Nontender to palpation, normoactive bowel sounds, no rebound/guarding; No hepatosplenomegaly  MUSCLOSKELETAL: no clubbing or cyanosis of digits; no joint swelling or tenderness to palpation  PSYCH: A+O x0    LABS:                        9.8    5.18  )-----------( 135      ( 01 Jan 2021 07:14 )             31.1     01-01    138  |  101  |  27<H>  ----------------------------<  100<H>  3.9   |  27  |  1.02    Ca    10.0      01 Jan 2021 07:13    TPro  7.1  /  Alb  4.3  /  TBili  0.3  /  DBili  x   /  AST  17  /  ALT  9<L>  /  AlkPhos  63  12-31    PT/INR - ( 31 Dec 2020 22:23 )   PT: 13.9 sec;   INR: 1.17 ratio         PTT - ( 31 Dec 2020 22:23 )  PTT:28.2 sec  CARDIAC MARKERS ( 31 Dec 2020 22:23 )  x     / x     / 70 U/L / x     / 1.1 ng/mL            RADIOLOGY & ADDITIONAL TESTS:  Results Reviewed:   Imaging Personally Reviewed:  Electrocardiogram Personally Reviewed:    COORDINATION OF CARE:  Care Discussed with Consultants/Other Providers [Y/N]:  Prior or Outpatient Records Reviewed [Y/N]:  
PROGRESS NOTE:     Patient is a 85y old  Male who presents with a chief complaint of subarachnoid hemorrhage, subdural hemorrhage (02 Jan 2021 14:26)      SUBJECTIVE / OVERNIGHT EVENTS:  RENAY  ADDITIONAL REVIEW OF SYSTEMS:  not able to obtain 2/2 dementia  MEDICATIONS  (STANDING):  cholecalciferol 1000 Unit(s) Oral daily  cyanocobalamin 1000 MICROGram(s) Oral daily  ferrous    sulfate 325 milliGRAM(s) Oral two times a day  folic acid 1 milliGRAM(s) Oral daily  levETIRAcetam  IVPB 500 milliGRAM(s) IV Intermittent every 12 hours  multivitamin 1 Tablet(s) Oral daily  PARoxetine 10 milliGRAM(s) Oral daily    MEDICATIONS  (PRN):      CAPILLARY BLOOD GLUCOSE        I&O's Summary    02 Jan 2021 07:01  -  03 Jan 2021 07:00  --------------------------------------------------------  IN: 560 mL / OUT: 601 mL / NET: -41 mL        PHYSICAL EXAM:  Vital Signs Last 24 Hrs  T(C): 36.6 (03 Jan 2021 05:35), Max: 37 (02 Jan 2021 21:30)  T(F): 97.9 (03 Jan 2021 05:35), Max: 98.6 (02 Jan 2021 21:30)  HR: 78 (03 Jan 2021 05:35) (78 - 88)  BP: 135/76 (03 Jan 2021 05:35) (122/64 - 135/76)  BP(mean): --  RR: 17 (03 Jan 2021 05:35) (17 - 17)  SpO2: 93% (03 Jan 2021 05:35) (93% - 94%)    CONSTITUTIONAL: NAD, non toxic appearing  RESPIRATORY: Normal respiratory effort; lungs are clear to auscultation bilaterally  CARDIOVASCULAR: Regular rate and rhythm, normal S1 and S2, no murmur/rub/gallop; No lower extremity edema; Peripheral pulses are 2+ bilaterally  ABDOMEN: Nontender to palpation, normoactive bowel sounds, no rebound/guarding; No hepatosplenomegaly  MUSCLOSKELETAL: no clubbing or cyanosis of digits; no joint swelling or tenderness to palpation  PSYCH: A+O to person       LABS:                        9.6    5.40  )-----------( 129      ( 02 Jan 2021 07:22 )             30.1     01-02    140  |  103  |  28<H>  ----------------------------<  103<H>  4.3   |  28  |  1.03    Ca    9.7      02 Jan 2021 07:22                  RADIOLOGY & ADDITIONAL TESTS:  Results Reviewed:   Imaging Personally Reviewed:  Electrocardiogram Personally Reviewed:    COORDINATION OF CARE:  Care Discussed with Consultants/Other Providers [Y/N]:  Prior or Outpatient Records Reviewed [Y/N]:  
PROGRESS NOTE:     Patient is a 85y old  Male who presents with a chief complaint of subarachnoid hemorrhage, subdural hemorrhage (01 Jan 2021 14:47)      SUBJECTIVE / OVERNIGHT EVENTS: RENAY    ADDITIONAL REVIEW OF SYSTEMS:  no fever or chills  No n/v/d    MEDICATIONS  (STANDING):  cholecalciferol 1000 Unit(s) Oral daily  cyanocobalamin 1000 MICROGram(s) Oral daily  ferrous    sulfate 325 milliGRAM(s) Oral two times a day  folic acid 1 milliGRAM(s) Oral daily  levETIRAcetam  IVPB 500 milliGRAM(s) IV Intermittent every 12 hours  multivitamin 1 Tablet(s) Oral daily  PARoxetine 10 milliGRAM(s) Oral daily    MEDICATIONS  (PRN):      CAPILLARY BLOOD GLUCOSE        I&O's Summary    01 Jan 2021 07:01  -  02 Jan 2021 07:00  --------------------------------------------------------  IN: 460 mL / OUT: 0 mL / NET: 460 mL    02 Jan 2021 07:01  -  02 Jan 2021 14:26  --------------------------------------------------------  IN: 360 mL / OUT: 1 mL / NET: 359 mL        PHYSICAL EXAM:  Vital Signs Last 24 Hrs  T(C): 36.2 (02 Jan 2021 12:38), Max: 36.8 (02 Jan 2021 00:33)  T(F): 97.1 (02 Jan 2021 12:38), Max: 98.2 (02 Jan 2021 00:33)  HR: 77 (02 Jan 2021 12:38) (77 - 81)  BP: 127/77 (02 Jan 2021 12:38) (127/77 - 147/79)  BP(mean): --  RR: 17 (02 Jan 2021 12:38) (17 - 18)  SpO2: 93% (02 Jan 2021 12:38) (93% - 95%)    CONSTITUTIONAL: NAD, thin, cachectic; non toxic   RESPIRATORY: Normal respiratory effort; lungs are clear to auscultation bilaterally  CARDIOVASCULAR: Regular rate and rhythm, normal S1 and S2, no murmur/rub/gallop; No lower extremity edema; Peripheral pulses are 2+ bilaterally  ABDOMEN: Nontender to palpation, normoactive bowel sounds, no rebound/guarding; No hepatosplenomegaly  MUSCLOSKELETAL: no clubbing or cyanosis of digits; no joint swelling or tenderness to palpation  PSYCH: A+O to person, place, and time; affect appropriate    LABS:                        9.6    5.40  )-----------( 129      ( 02 Jan 2021 07:22 )             30.1     01-02    140  |  103  |  28<H>  ----------------------------<  103<H>  4.3   |  28  |  1.03    Ca    9.7      02 Jan 2021 07:22    TPro  7.1  /  Alb  4.3  /  TBili  0.3  /  DBili  x   /  AST  17  /  ALT  9<L>  /  AlkPhos  63  12-31    PT/INR - ( 31 Dec 2020 22:23 )   PT: 13.9 sec;   INR: 1.17 ratio         PTT - ( 31 Dec 2020 22:23 )  PTT:28.2 sec  CARDIAC MARKERS ( 31 Dec 2020 22:23 )  x     / x     / 70 U/L / x     / 1.1 ng/mL            RADIOLOGY & ADDITIONAL TESTS:  Results Reviewed:   Imaging Personally Reviewed:  Electrocardiogram Personally Reviewed:    COORDINATION OF CARE:  Care Discussed with Consultants/Other Providers [Y/N]:  Prior or Outpatient Records Reviewed [Y/N]:

## 2021-01-03 NOTE — PROGRESS NOTE ADULT - PROBLEM SELECTOR PLAN 2
Hold home dose of aspirin.  Provide frequent reorientation.   fall and aspiration precautions. Elevate head of bed.
Hold home dose of aspirin.  Provide frequent reorientation.  Start fall and aspiration precautions. Elevate head of bed.
Hold home dose of aspirin.  Provide frequent reorientation.   fall and aspiration precautions. Elevate head of bed.

## 2021-01-04 ENCOUNTER — TRANSCRIPTION ENCOUNTER (OUTPATIENT)
Age: 86
End: 2021-01-04

## 2021-01-04 VITALS
HEART RATE: 88 BPM | SYSTOLIC BLOOD PRESSURE: 134 MMHG | OXYGEN SATURATION: 97 % | DIASTOLIC BLOOD PRESSURE: 77 MMHG | RESPIRATION RATE: 18 BRPM | TEMPERATURE: 98 F

## 2021-01-04 PROCEDURE — 99239 HOSP IP/OBS DSCHRG MGMT >30: CPT

## 2021-01-04 RX ADMIN — Medication 1 MILLIGRAM(S): at 11:09

## 2021-01-04 RX ADMIN — Medication 1 TABLET(S): at 11:09

## 2021-01-04 RX ADMIN — PREGABALIN 1000 MICROGRAM(S): 225 CAPSULE ORAL at 11:09

## 2021-01-04 RX ADMIN — Medication 10 MILLIGRAM(S): at 11:09

## 2021-01-04 RX ADMIN — LEVETIRACETAM 400 MILLIGRAM(S): 250 TABLET, FILM COATED ORAL at 05:28

## 2021-01-04 RX ADMIN — Medication 325 MILLIGRAM(S): at 05:28

## 2021-01-04 RX ADMIN — Medication 1000 UNIT(S): at 11:09

## 2021-01-04 NOTE — DISCHARGE NOTE PROVIDER - NSDCCPCAREPLAN_GEN_ALL_CORE_FT
PRINCIPAL DISCHARGE DIAGNOSIS  Diagnosis: Subdural hematoma  Assessment and Plan of Treatment: Stable  Hold your aspirin until you follow up with your neurosurgeon, Dr. Asif.  At this appointment, you will discuss if/when to restart.      SECONDARY DISCHARGE DIAGNOSES  Diagnosis: Other laceration  Assessment and Plan of Treatment: You will need to follow up with your primary medical doctor on 1/13/21 - please call to make an appointment.  At this appointment, you will need your head staples removed.    Diagnosis: Abdominal aortic aneurysm  Assessment and Plan of Treatment:     Diagnosis: Subarachnoid hematoma  Assessment and Plan of Treatment: Stable    Diagnosis: Compression fracture of spine  Assessment and Plan of Treatment: Stable  No complaints of pain    Diagnosis: T2DM (type 2 diabetes mellitus)  Assessment and Plan of Treatment: Make sure you get your HgA1c checked every three months.  If you take oral diabetes medications, check your blood glucose two times a day.  If you take insulin, check your blood glucose before meals and at bedtime.  It's important not to skip any meals.  Keep a log of your blood glucose results and always take it with you to your doctor appointments.  Keep a list of your current medications including injectables and over the counter medications and bring this medication list with you to all your doctor appointments.  If you have not seen your ophthalmologist this year call for appointment.  Check your feet daily for redness, sores, or openings. Do not self treat. If no improvement in two days call your primary care physician for an appointment.  Low blood sugar (hypoglycemia) is a blood sugar below 70mg/dl. Check your blood sugar if you feel signs/symptoms of hypoglycemia. If your blood sugar is below 70 take 15 grams of carbohydrates (ex 4 oz of apple juice, 3-4 glucose tablets, or 4-6 oz of regular soda) wait 15 minutes and repeat blood sugar to make sure it comes up above 70.  If your blood sugar is above 70 and you are due for a meal, have a meal.  If you are not due for a meal have a snack.  This snack helps keeps your blood sugar at a safe range.       PRINCIPAL DISCHARGE DIAGNOSIS  Diagnosis: Subdural hematoma  Assessment and Plan of Treatment: Stable  Hold your aspirin until you follow up with your neurosurgeon, Dr. Asif within one week of discharge.  At this appointment, you will discuss if/when to restart.      SECONDARY DISCHARGE DIAGNOSES  Diagnosis: Other laceration  Assessment and Plan of Treatment: You will need to follow up with your primary medical doctor on 1/13/21 - please call to make an appointment.  At this appointment, you will need your head staples removed.    Diagnosis: Abdominal aortic aneurysm  Assessment and Plan of Treatment: You will need to follow up with your primary medical doctor within one week of discharge - please call to make an appointment.  At this appointment, you will discuss further managment for your abdominal aortic aneurysm.    Diagnosis: Subarachnoid hematoma  Assessment and Plan of Treatment: Stable  Follow up with your neurosurgeon, Dr. Asif, within one week.    Diagnosis: Compression fracture of spine  Assessment and Plan of Treatment: Stable, chronic  No complaints of pain    Diagnosis: T2DM (type 2 diabetes mellitus)  Assessment and Plan of Treatment: Make sure you get your HgA1c checked every three months.  If you take oral diabetes medications, check your blood glucose two times a day.  If you take insulin, check your blood glucose before meals and at bedtime.  It's important not to skip any meals.  Keep a log of your blood glucose results and always take it with you to your doctor appointments.  Keep a list of your current medications including injectables and over the counter medications and bring this medication list with you to all your doctor appointments.  If you have not seen your ophthalmologist this year call for appointment.  Check your feet daily for redness, sores, or openings. Do not self treat. If no improvement in two days call your primary care physician for an appointment.  Low blood sugar (hypoglycemia) is a blood sugar below 70mg/dl. Check your blood sugar if you feel signs/symptoms of hypoglycemia. If your blood sugar is below 70 take 15 grams of carbohydrates (ex 4 oz of apple juice, 3-4 glucose tablets, or 4-6 oz of regular soda) wait 15 minutes and repeat blood sugar to make sure it comes up above 70.  If your blood sugar is above 70 and you are due for a meal, have a meal.  If you are not due for a meal have a snack.  This snack helps keeps your blood sugar at a safe range.

## 2021-01-04 NOTE — DISCHARGE NOTE NURSING/CASE MANAGEMENT/SOCIAL WORK - PATIENT PORTAL LINK FT
You can access the FollowMyHealth Patient Portal offered by [facility] by registering at the following website: [email or web address]. By joining [facility]’s FollowMyHealth portal, you will also be able to view your health information using other applications (apps) compatible with our system.

## 2021-01-04 NOTE — DISCHARGE NOTE NURSING/CASE MANAGEMENT/SOCIAL WORK - NSDCFUADDAPPT_GEN_ALL_CORE_FT
You will need to follow up with your primary medical doctor on January 13, 2020 - please call to make an appointment.  At this appointment, you will need your head staples removed.    You will need to follow up with your neurosurgeon, Dr. Asif, within one week of discharge - please call to make an appointment.  At this appointment, you will discuss if/when to restart your aspirin.

## 2021-01-04 NOTE — DISCHARGE NOTE PROVIDER - NSDCFUADDAPPT_GEN_ALL_CORE_FT
You will need to follow up with your primary medical doctor on January 13, 2020 - please call to make an appointment.  At this appointment, you will need your head staples removed.    You will need to follow up with your neurosurgeon, Dr. Asif,  You will need to follow up with your primary medical doctor on January 13, 2020 - please call to make an appointment.  At this appointment, you will need your head staples removed.    You will need to follow up with your neurosurgeon, Dr. Asif, within one week of discharge - please call to make an appointment.  At this appointment, you will discuss if/when to restart your aspirin.

## 2021-01-04 NOTE — DISCHARGE NOTE PROVIDER - NSDCMRMEDTOKEN_GEN_ALL_CORE_FT
aspirin 81 mg oral tablet, chewable: 1 tab(s) orally once a day  cyanocobalamin 1000 mcg oral tablet: 1 tab(s) orally once a day  ferrous sulfate 325 mg (65 mg elemental iron) oral tablet: 1 tab(s) orally 2 times a day  folic acid 1 mg oral tablet: 1 tab(s) orally once a day  levETIRAcetam 500 mg oral tablet: 1 tab(s) orally 2 times a day  Multiple Vitamins oral tablet: 1 tab(s) orally once a day  PARoxetine 10 mg oral tablet: 1 tab(s) orally once a day  Vitamin D3 1000 intl units (25 mcg) oral tablet: 1 tab(s) orally once a day   cyanocobalamin 1000 mcg oral tablet: 1 tab(s) orally once a day  ferrous sulfate 325 mg (65 mg elemental iron) oral tablet: 1 tab(s) orally 2 times a day  folic acid 1 mg oral tablet: 1 tab(s) orally once a day  levETIRAcetam 500 mg oral tablet: 1 tab(s) orally 2 times a day  Multiple Vitamins oral tablet: 1 tab(s) orally once a day  PARoxetine 10 mg oral tablet: 1 tab(s) orally once a day  Vitamin D3 1000 intl units (25 mcg) oral tablet: 1 tab(s) orally once a day

## 2021-01-04 NOTE — DISCHARGE NOTE PROVIDER - CARE PROVIDER_API CALL
David Asif  NEUROSURGERY  [address] [place] [address]  Phone: [phone]  Fax: [phone]  Follow Up Time:

## 2021-01-04 NOTE — DISCHARGE NOTE PROVIDER - HOSPITAL COURSE
This patient is an 85 year old gentleman with PMH of tuberculosis, htn, seizures, anxiety, COPD, DM, vascular dementia, PVD, iron deficiency anemia, hypothyroidism, T2DM, vitamin D deficiency, atherosclerosis who presents to after being found down at [facility], with occipital laceration, found to have SAH and SDH at [facility], transferred to [facility] for continued care.  On repeat CT Head, the right frontal SAH, quadrigeminal plate cistern SAH and small subdural hematoma along the posterior falx were not significantly changed.  Neurosurgery and Trauma surgery consulted.  Started on seizure prophylaxis.  The patient was incidentally found on CT A/P to have a 6.2cm infrarenal abdominal aortic aneurysm (this was noted to be 5cm in 2018) - sister contacted and instructed to follow up outpatient.   CT identified age-indeterminate compression fracture deformity of T3 vertebral body and superior endplates of T8,T9 and T10 - no signs of back pain noted.  Cleared for discharge by Dr. Manrique, with PMD (staple removal), Neurosurgery follow up.           This patient is an 85 year old gentleman with PMH of tuberculosis, htn, seizures, anxiety, COPD, DM, vascular dementia, PVD, iron deficiency anemia, hypothyroidism, T2DM, vitamin D deficiency, atherosclerosis who presents to after being found down at [facility], with occipital laceration, found to have SAH and SDH at [facility], transferred to [facility] for continued care.  On repeat CT Head, the right frontal SAH, quadrigeminal plate cistern SAH and small subdural hematoma along the posterior falx were not significantly changed.  Neurosurgery and Trauma surgery consulted.  Continued on seizure prophylaxis.  The patient was incidentally found on CT A/P to have a 6.2cm infrarenal abdominal aortic aneurysm (this was noted to be 5cm in 2018) - sister contacted and instructed to follow up outpatient.   CT identified age-indeterminate compression fracture deformity of T3 vertebral body and superior endplates of T8,T9 and T10 - no signs of back pain noted.  Cleared for discharge by Dr. Manrique, with PMD (staple removal), Neurosurgery follow up.

## 2021-01-04 NOTE — CHART NOTE - NSCHARTNOTEFT_GEN_A_CORE
Request from Dr. Manrique to facilitate patient discharge.  Medication reconciliation reviewed, revised, and resolved with Dr. Manrique, who has medically cleared patient for discharge with follow up as advised.  Please refer to discharge note for detailed hospital course.

## 2021-01-04 NOTE — DISCHARGE NOTE PROVIDER - NSDCPNSUBOBJ_GEN_ALL_CORE
admitted for SDH now stable on repeat head CT.  Already on Keppra for seizure disorder which will help with seizure prophylaxis.  Discussed with sister about his AAA which is slightly enlarged from before but given his overall dementia and comorbid conditions we discussed following this as an outpatient and no need for further work up here.  Discharge back to [facility] today. Discharge time 39 minutes. Holding aspirin after SDH until he follows up with neurosurgery as an outpatient.

## 2021-01-05 RX ORDER — ASPIRIN/CALCIUM CARB/MAGNESIUM 324 MG
1 TABLET ORAL
Qty: 0 | Refills: 0 | DISCHARGE

## 2021-01-14 PROBLEM — Z86.79 PERSONAL HISTORY OF OTHER DISEASES OF THE CIRCULATORY SYSTEM: Chronic | Status: ACTIVE | Noted: 2021-01-01

## 2021-01-14 PROBLEM — E11.9 TYPE 2 DIABETES MELLITUS WITHOUT COMPLICATIONS: Chronic | Status: ACTIVE | Noted: 2021-01-01

## 2021-01-14 PROBLEM — R56.9 UNSPECIFIED CONVULSIONS: Chronic | Status: ACTIVE | Noted: 2021-01-01

## 2021-01-14 PROBLEM — E55.9 VITAMIN D DEFICIENCY, UNSPECIFIED: Chronic | Status: ACTIVE | Noted: 2021-01-01

## 2021-01-14 PROBLEM — I73.9 PERIPHERAL VASCULAR DISEASE, UNSPECIFIED: Chronic | Status: ACTIVE | Noted: 2021-01-01

## 2021-01-14 PROBLEM — J44.9 CHRONIC OBSTRUCTIVE PULMONARY DISEASE, UNSPECIFIED: Chronic | Status: ACTIVE | Noted: 2021-01-01

## 2021-01-14 PROBLEM — I10 ESSENTIAL (PRIMARY) HYPERTENSION: Chronic | Status: ACTIVE | Noted: 2021-01-01

## 2021-01-14 PROBLEM — F01.50 VASCULAR DEMENTIA WITHOUT BEHAVIORAL DISTURBANCE: Chronic | Status: ACTIVE | Noted: 2021-01-01

## 2021-01-20 PROCEDURE — 90471 IMMUNIZATION ADMIN: CPT

## 2021-01-20 PROCEDURE — 85027 COMPLETE CBC AUTOMATED: CPT

## 2021-01-20 PROCEDURE — U0003: CPT

## 2021-01-20 PROCEDURE — 83880 ASSAY OF NATRIURETIC PEPTIDE: CPT

## 2021-01-20 PROCEDURE — 83540 ASSAY OF IRON: CPT

## 2021-01-20 PROCEDURE — 80053 COMPREHEN METABOLIC PANEL: CPT

## 2021-01-20 PROCEDURE — 97166 OT EVAL MOD COMPLEX 45 MIN: CPT

## 2021-01-20 PROCEDURE — 70450 CT HEAD/BRAIN W/O DYE: CPT

## 2021-01-20 PROCEDURE — 82607 VITAMIN B-12: CPT

## 2021-01-20 PROCEDURE — 82550 ASSAY OF CK (CPK): CPT

## 2021-01-20 PROCEDURE — 93005 ELECTROCARDIOGRAM TRACING: CPT

## 2021-01-20 PROCEDURE — 71260 CT THORAX DX C+: CPT

## 2021-01-20 PROCEDURE — 82553 CREATINE MB FRACTION: CPT

## 2021-01-20 PROCEDURE — 86769 SARS-COV-2 COVID-19 ANTIBODY: CPT

## 2021-01-20 PROCEDURE — U0005: CPT

## 2021-01-20 PROCEDURE — 84484 ASSAY OF TROPONIN QUANT: CPT

## 2021-01-20 PROCEDURE — 71045 X-RAY EXAM CHEST 1 VIEW: CPT

## 2021-01-20 PROCEDURE — 85730 THROMBOPLASTIN TIME PARTIAL: CPT

## 2021-01-20 PROCEDURE — 82728 ASSAY OF FERRITIN: CPT

## 2021-01-20 PROCEDURE — 83036 HEMOGLOBIN GLYCOSYLATED A1C: CPT

## 2021-01-20 PROCEDURE — 82962 GLUCOSE BLOOD TEST: CPT

## 2021-01-20 PROCEDURE — 86900 BLOOD TYPING SEROLOGIC ABO: CPT

## 2021-01-20 PROCEDURE — 85025 COMPLETE CBC W/AUTO DIFF WBC: CPT

## 2021-01-20 PROCEDURE — 84443 ASSAY THYROID STIM HORMONE: CPT

## 2021-01-20 PROCEDURE — 90715 TDAP VACCINE 7 YRS/> IM: CPT

## 2021-01-20 PROCEDURE — 86850 RBC ANTIBODY SCREEN: CPT

## 2021-01-20 PROCEDURE — 86901 BLOOD TYPING SEROLOGIC RH(D): CPT

## 2021-01-20 PROCEDURE — 80048 BASIC METABOLIC PNL TOTAL CA: CPT

## 2021-01-20 PROCEDURE — 74177 CT ABD & PELVIS W/CONTRAST: CPT

## 2021-01-20 PROCEDURE — 82746 ASSAY OF FOLIC ACID SERUM: CPT

## 2021-01-20 PROCEDURE — 85610 PROTHROMBIN TIME: CPT

## 2021-01-20 PROCEDURE — 83550 IRON BINDING TEST: CPT

## 2021-01-20 PROCEDURE — 99285 EMERGENCY DEPT VISIT HI MDM: CPT | Mod: 25

## 2021-02-05 ENCOUNTER — APPOINTMENT (OUTPATIENT)
Dept: NEUROSURGERY | Facility: CLINIC | Age: 86
End: 2021-02-05

## 2021-02-08 ENCOUNTER — RESULT REVIEW (OUTPATIENT)
Age: 86
End: 2021-02-08

## 2021-02-08 ENCOUNTER — OUTPATIENT (OUTPATIENT)
Dept: OUTPATIENT SERVICES | Facility: HOSPITAL | Age: 86
LOS: 1 days | End: 2021-02-08
Payer: MEDICARE

## 2021-02-08 ENCOUNTER — APPOINTMENT (OUTPATIENT)
Dept: NEUROSURGERY | Facility: CLINIC | Age: 86
End: 2021-02-08
Payer: MEDICARE

## 2021-02-08 VITALS
WEIGHT: 136 LBS | SYSTOLIC BLOOD PRESSURE: 109 MMHG | HEIGHT: 65 IN | BODY MASS INDEX: 22.66 KG/M2 | RESPIRATION RATE: 18 BRPM | TEMPERATURE: 97.6 F | DIASTOLIC BLOOD PRESSURE: 66 MMHG | OXYGEN SATURATION: 94 % | HEART RATE: 97 BPM

## 2021-02-08 DIAGNOSIS — S06.5X9A TRAUMATIC SUBDURAL HEMORRHAGE WITH LOSS OF CONSCIOUSNESS OF UNSPECIFIED DURATION, INITIAL ENCOUNTER: ICD-10-CM

## 2021-02-08 PROCEDURE — 70450 CT HEAD/BRAIN W/O DYE: CPT

## 2021-02-08 PROCEDURE — 70450 CT HEAD/BRAIN W/O DYE: CPT | Mod: 26,MH

## 2021-02-08 PROCEDURE — 99215 OFFICE O/P EST HI 40 MIN: CPT

## 2021-02-08 NOTE — RESULTS/DATA
[FreeTextEntry1] : EXAM: CT BRAIN\par \par \par PROCEDURE DATE: 02/08/2021\par \par \par \par \par INTERPRETATION: CLINICAL INDICATION: Follow-up traumatic subarachnoid and subdural hemorrhage\par \par 5mm axial sections of the brain were obtained from base to vertex, without the intravenous administration of contrast material. Coronal and sagittal computer generated reconstructed views are available.\par \par Comparison is made with the prior CT of 1/1/2021.\par \par Since the prior examination the high density subdural hemorrhage in the interhemispheric fissure has resolved. The hemorrhage along the right frontal cortex consistent with traumatic subarachnoid hemorrhage and the subarachnoid hemorrhage along the quadrigeminal plate cistern have resolved. There is lucency involving the inferior frontal lobes and the temporal lobes bilaterally consistent with old trauma.\par \par Moderate atrophy is identified with ex vacuo dilatation of the frontal horns of the lateral ventricles.\par \par IMPRESSION: Since 1/1/2021 and the traumatic subarachnoid hemorrhage along the right frontal cortex, quadrigeminal plate cistern and subdural hemorrhage in the interhemispheric fissure have resolved. No change in encephalomalacia and gliosis due to old trauma inferior frontal lobes and temporal lobes bilaterally.\par \par \par

## 2021-02-08 NOTE — ASSESSMENT
[FreeTextEntry1] : This patient is an 85 year old gentleman with PMH  seizures on Keppra with h/o fall and suffered \par  SAH and SDH  with no neurological symptoms. Pat non verbal , but follows simple commands. CT today showed  traumatic subarachnoid hemorrhage along the right frontal cortex, quadrigeminal plate cistern and subdural hemorrhage in the interhemispheric fissure have resolved.\par \par PLAN:\par Continue doing PT and OT \par Precautions given for  prevention of  fall / trauma\par Warning signs to immediate follow up advised.\par No follow up needed unless clinically indicated.

## 2021-02-08 NOTE — HISTORY OF PRESENT ILLNESS
[< 3 months] : less than 3 months [FreeTextEntry1] : Brain bleed [de-identified] : This patient is an 85 year old gentleman with PMH of tuberculosis, HTN, seizures, anxiety, COPD, DM, vascular dementia, PVD, iron deficiency anemia, \par hypothyroidism, T2DM, vitamin D deficiency, atherosclerosis who presents to  after being found down at [facility], with occipital laceration, \par found to have SAH and SDH at [facility], transferred to [facility] for continued care.  On repeat CT Head, the right frontal SAH, quadrigeminal plate \par cistern SAH and small subdural hematoma along the posterior falx were not significantly changed. Pt eventually was sent to nursing home with instructions to FU with NS.\par \par Today he is  doing follow up in the office came in a wheel chair with the aid. Pt is non  verbal, can not get much information as the accompanied aid has not know no much information.   Pt able to follow simple commands. as per the aid he does not walk well after the fall. As per aid he eats chopped food now. Pt mouthing words and gestures to  to respond.

## 2021-02-08 NOTE — PHYSICAL EXAM
[Person] : oriented to person [Place] : oriented to place [Time] : oriented to time [Short Term Intact] : short term memory intact [Cranial Nerves Optic (II)] : visual acuity intact bilaterally,  pupils equal round and reactive to light [Cranial Nerves Oculomotor (III)] : extraocular motion intact [Cranial Nerves Trigeminal (V)] : facial sensation intact symmetrically [Cranial Nerves Facial (VII)] : face symmetrical [Cranial Nerves Vestibulocochlear (VIII)] : hearing was intact bilaterally [Cranial Nerves Accessory (XI - Cranial And Spinal)] : head turning and shoulder shrug symmetric [Motor Tone] : muscle tone was normal in all four extremities [Motor Strength] : muscle strength was normal in all four extremities [Sensation Tactile Decrease] : light touch was intact [Sensation Pain / Temperature Decrease] : pain and temperature was intact [Abnormal Walk] : normal gait [Balance] : balance was intact [Sclera] : the sclera and conjunctiva were normal [PERRL With Normal Accommodation] : pupils were equal in size, round, reactive to light, with normal accommodation [Outer Ear] : the ears and nose were normal in appearance [Both Tympanic Membranes Were Examined] : both tympanic membranes were normal [] : no respiratory distress [Respiration, Rhythm And Depth] : normal respiratory rhythm and effort [Apical Impulse] : the apical impulse was normal [Heart Rate And Rhythm] : heart rate was normal and rhythm regular [No CVA Tenderness] : no ~M costovertebral angle tenderness [Involuntary Movements] : no involuntary movements were seen [Nail Clubbing] : no clubbing  or cyanosis of the fingernails [Musculoskeletal - Swelling] : no joint swelling seen [Skin Color & Pigmentation] : normal skin color and pigmentation [Skin Turgor] : normal skin turgor [FreeTextEntry8] : no pronator drift [FreeTextEntry1] : Pt in wheelchair

## 2021-02-26 NOTE — PROGRESS NOTE ADULT - PROBLEM/PLAN-1
DISPLAY PLAN FREE TEXT
pt received in bed asleep arousable +lethargic w/o c/o pain. +room air +ulcerations on lower lip w/ dried and fresh blood. Oral performed by SLP.
DISPLAY PLAN FREE TEXT
pt received in bed asleep arousable +lethargic w/o c/o pain. +room air +ulcerations on lower lip w/ dried and fresh blood. Oral care performed by SLP. Per staff, pt w/ poor PO intake for solids - is mostly drinking Ensure liquids.

## 2021-02-27 NOTE — ED ADULT TRIAGE NOTE - NURSING HOMES
Received patient sitting on side of stretcher, awake, alert, oriented x 4. Introduced myself as part of her care team.  Explanation of plan of care provided to the patient. Instructed patient on the need for a urine specimen and blood sample. Patient states, \"I'm on the phone. Give me a minute. \"  Informed patient that I will return in approximately 10 minutes to obtain blood sample. Patient then states, \"I asked for a pen a long time ago. I need a pen to take down notes so that I can compare it to your notes, to make sure that what you have and what I have are the same. I also need the name of someone in medical records, because I was here back in July and I need some information from then. I need your name and the names of everyone here so that I can write it down. \"  I then informed her that part of her plan of care was to ensure that she is medically cleared prior to being evaluated by the crisis nurse. She then replied, \"I know the process. I'm just not ready for you to do that. \"  I then informed her that we should not delay the process; that the sooner we ensure that she is medically healthy, we can contact the crisis nurse. The patient then got upset, started to talk over me, not listening to my explanation. I informed her that I will step out and will be back when she has calmed down. She then asked for my name again, stated, \"I am going to report you. \"  I then repeated my name and left her room. Dr. Stephens Earpamela informed of situation and will speak to patient. [facility], [facility]

## 2021-03-06 NOTE — ED ADULT NURSE NOTE - NS ED NURSE LEVEL OF CONSCIOUSNESS ORIENTATION
Age appropriate behavior

## 2021-05-14 NOTE — DISCHARGE NOTE ADULT - DISCHARGE TO
----- Message from NAUN Ricketts sent at 5/14/2021  8:21 AM PDT -----  Mild carotid disease. Follow up with [facility] as planned. Continue statin, no ASA on chart? Is he taking this? [facility]   Rehab

## 2022-03-31 ENCOUNTER — INPATIENT (INPATIENT)
Facility: HOSPITAL | Age: 87
LOS: 5 days | Discharge: NURSING, REHAB OR LONG-TERM CARE | DRG: 871 | End: 2022-04-06
Attending: INTERNAL MEDICINE | Admitting: INTERNAL MEDICINE
Payer: MEDICARE

## 2022-03-31 VITALS
DIASTOLIC BLOOD PRESSURE: 38 MMHG | TEMPERATURE: 99 F | HEART RATE: 119 BPM | OXYGEN SATURATION: 96 % | SYSTOLIC BLOOD PRESSURE: 60 MMHG | WEIGHT: 119.71 LBS | HEIGHT: 68 IN | RESPIRATION RATE: 22 BRPM

## 2022-03-31 DIAGNOSIS — E11.9 TYPE 2 DIABETES MELLITUS WITHOUT COMPLICATIONS: ICD-10-CM

## 2022-03-31 DIAGNOSIS — K92.2 GASTROINTESTINAL HEMORRHAGE, UNSPECIFIED: ICD-10-CM

## 2022-03-31 DIAGNOSIS — I73.9 PERIPHERAL VASCULAR DISEASE, UNSPECIFIED: ICD-10-CM

## 2022-03-31 DIAGNOSIS — I10 ESSENTIAL (PRIMARY) HYPERTENSION: ICD-10-CM

## 2022-03-31 DIAGNOSIS — F01.50 VASCULAR DEMENTIA WITHOUT BEHAVIORAL DISTURBANCE: ICD-10-CM

## 2022-03-31 DIAGNOSIS — G40.909 EPILEPSY, UNSPECIFIED, NOT INTRACTABLE, WITHOUT STATUS EPILEPTICUS: ICD-10-CM

## 2022-03-31 DIAGNOSIS — D50.9 IRON DEFICIENCY ANEMIA, UNSPECIFIED: ICD-10-CM

## 2022-03-31 DIAGNOSIS — A41.9 SEPSIS, UNSPECIFIED ORGANISM: ICD-10-CM

## 2022-03-31 DIAGNOSIS — Z29.9 ENCOUNTER FOR PROPHYLACTIC MEASURES, UNSPECIFIED: ICD-10-CM

## 2022-03-31 DIAGNOSIS — E83.52 HYPERCALCEMIA: ICD-10-CM

## 2022-03-31 DIAGNOSIS — Z87.09 PERSONAL HISTORY OF OTHER DISEASES OF THE RESPIRATORY SYSTEM: ICD-10-CM

## 2022-03-31 LAB
ALBUMIN SERPL ELPH-MCNC: 3.4 G/DL — LOW (ref 3.5–5)
ALP SERPL-CCNC: 73 U/L — SIGNIFICANT CHANGE UP (ref 40–120)
ALT FLD-CCNC: 22 U/L DA — SIGNIFICANT CHANGE UP (ref 10–60)
ANION GAP SERPL CALC-SCNC: 11 MMOL/L — SIGNIFICANT CHANGE UP (ref 5–17)
APPEARANCE UR: ABNORMAL
AST SERPL-CCNC: 28 U/L — SIGNIFICANT CHANGE UP (ref 10–40)
BASOPHILS # BLD AUTO: 0 K/UL — SIGNIFICANT CHANGE UP (ref 0–0.2)
BASOPHILS NFR BLD AUTO: 0 % — SIGNIFICANT CHANGE UP (ref 0–2)
BILIRUB SERPL-MCNC: 0.9 MG/DL — SIGNIFICANT CHANGE UP (ref 0.2–1.2)
BILIRUB UR-MCNC: ABNORMAL
BUN SERPL-MCNC: 33 MG/DL — HIGH (ref 7–18)
CALCIUM SERPL-MCNC: 11.8 MG/DL — HIGH (ref 8.4–10.5)
CHLORIDE SERPL-SCNC: 102 MMOL/L — SIGNIFICANT CHANGE UP (ref 96–108)
CO2 SERPL-SCNC: 20 MMOL/L — LOW (ref 22–31)
COLOR SPEC: ABNORMAL
CREAT SERPL-MCNC: 2.15 MG/DL — HIGH (ref 0.5–1.3)
DIFF PNL FLD: ABNORMAL
EGFR: 30 ML/MIN/1.73M2 — LOW
EOSINOPHIL # BLD AUTO: 0.57 K/UL — HIGH (ref 0–0.5)
EOSINOPHIL NFR BLD AUTO: 1 % — SIGNIFICANT CHANGE UP (ref 0–6)
GLUCOSE BLDC GLUCOMTR-MCNC: 104 MG/DL — HIGH (ref 70–99)
GLUCOSE SERPL-MCNC: 170 MG/DL — HIGH (ref 70–99)
GLUCOSE UR QL: NEGATIVE — SIGNIFICANT CHANGE UP
HCT VFR BLD CALC: 38.4 % — LOW (ref 39–50)
HGB BLD-MCNC: 12.7 G/DL — LOW (ref 13–17)
KETONES UR-MCNC: ABNORMAL
LACTATE SERPL-SCNC: 4.3 MMOL/L — CRITICAL HIGH (ref 0.7–2)
LACTATE SERPL-SCNC: 6.2 MMOL/L — CRITICAL HIGH (ref 0.7–2)
LEUKOCYTE ESTERASE UR-ACNC: ABNORMAL
LIDOCAIN IGE QN: 74 U/L — SIGNIFICANT CHANGE UP (ref 73–393)
LYMPHOCYTES # BLD AUTO: 2.28 K/UL — SIGNIFICANT CHANGE UP (ref 1–3.3)
LYMPHOCYTES # BLD AUTO: 4 % — LOW (ref 13–44)
MCHC RBC-ENTMCNC: 33.1 GM/DL — SIGNIFICANT CHANGE UP (ref 32–36)
MCHC RBC-ENTMCNC: 34.7 PG — HIGH (ref 27–34)
MCV RBC AUTO: 104.9 FL — HIGH (ref 80–100)
MONOCYTES # BLD AUTO: 4.56 K/UL — HIGH (ref 0–0.9)
MONOCYTES NFR BLD AUTO: 8 % — SIGNIFICANT CHANGE UP (ref 2–14)
NEUTROPHILS # BLD AUTO: 46.74 K/UL — HIGH (ref 1.8–7.4)
NEUTROPHILS NFR BLD AUTO: 79 % — HIGH (ref 43–77)
NITRITE UR-MCNC: NEGATIVE — SIGNIFICANT CHANGE UP
OB PNL STL: NEGATIVE — SIGNIFICANT CHANGE UP
PH UR: 5 — SIGNIFICANT CHANGE UP (ref 5–8)
PLATELET # BLD AUTO: 284 K/UL — SIGNIFICANT CHANGE UP (ref 150–400)
POTASSIUM SERPL-MCNC: 5.6 MMOL/L — HIGH (ref 3.5–5.3)
POTASSIUM SERPL-SCNC: 5.6 MMOL/L — HIGH (ref 3.5–5.3)
PROT SERPL-MCNC: 7.9 G/DL — SIGNIFICANT CHANGE UP (ref 6–8.3)
PROT UR-MCNC: 100
RBC # BLD: 3.66 M/UL — LOW (ref 4.2–5.8)
RBC # FLD: 14.2 % — SIGNIFICANT CHANGE UP (ref 10.3–14.5)
SARS-COV-2 RNA SPEC QL NAA+PROBE: SIGNIFICANT CHANGE UP
SODIUM SERPL-SCNC: 133 MMOL/L — LOW (ref 135–145)
SP GR SPEC: 1.02 — SIGNIFICANT CHANGE UP (ref 1.01–1.02)
UROBILINOGEN FLD QL: 8
WBC # BLD: 57 K/UL — CRITICAL HIGH (ref 3.8–10.5)
WBC # FLD AUTO: 57 K/UL — CRITICAL HIGH (ref 3.8–10.5)

## 2022-03-31 PROCEDURE — 71045 X-RAY EXAM CHEST 1 VIEW: CPT | Mod: 26

## 2022-03-31 PROCEDURE — 99291 CRITICAL CARE FIRST HOUR: CPT | Mod: CS

## 2022-03-31 PROCEDURE — 93010 ELECTROCARDIOGRAM REPORT: CPT

## 2022-03-31 PROCEDURE — 71250 CT THORAX DX C-: CPT | Mod: 26

## 2022-03-31 PROCEDURE — 74176 CT ABD & PELVIS W/O CONTRAST: CPT | Mod: 26

## 2022-03-31 RX ORDER — FOLIC ACID 0.8 MG
1 TABLET ORAL
Qty: 0 | Refills: 0 | DISCHARGE

## 2022-03-31 RX ORDER — PREGABALIN 225 MG/1
1 CAPSULE ORAL
Qty: 0 | Refills: 0 | DISCHARGE

## 2022-03-31 RX ORDER — LANOLIN ALCOHOL/MO/W.PET/CERES
3 CREAM (GRAM) TOPICAL AT BEDTIME
Refills: 0 | Status: DISCONTINUED | OUTPATIENT
Start: 2022-03-31 | End: 2022-04-06

## 2022-03-31 RX ORDER — GLUCAGON INJECTION, SOLUTION 0.5 MG/.1ML
1 INJECTION, SOLUTION SUBCUTANEOUS ONCE
Refills: 0 | Status: DISCONTINUED | OUTPATIENT
Start: 2022-03-31 | End: 2022-04-06

## 2022-03-31 RX ORDER — CEFEPIME 1 G/1
1000 INJECTION, POWDER, FOR SOLUTION INTRAMUSCULAR; INTRAVENOUS EVERY 12 HOURS
Refills: 0 | Status: DISCONTINUED | OUTPATIENT
Start: 2022-04-01 | End: 2022-04-06

## 2022-03-31 RX ORDER — SODIUM CHLORIDE 9 MG/ML
1000 INJECTION INTRAMUSCULAR; INTRAVENOUS; SUBCUTANEOUS ONCE
Refills: 0 | Status: COMPLETED | OUTPATIENT
Start: 2022-03-31 | End: 2022-03-31

## 2022-03-31 RX ORDER — INSULIN LISPRO 100/ML
VIAL (ML) SUBCUTANEOUS EVERY 6 HOURS
Refills: 0 | Status: DISCONTINUED | OUTPATIENT
Start: 2022-03-31 | End: 2022-04-06

## 2022-03-31 RX ORDER — ACETAMINOPHEN 500 MG
650 TABLET ORAL EVERY 6 HOURS
Refills: 0 | Status: DISCONTINUED | OUTPATIENT
Start: 2022-03-31 | End: 2022-04-06

## 2022-03-31 RX ORDER — SODIUM CHLORIDE 9 MG/ML
1000 INJECTION, SOLUTION INTRAVENOUS
Refills: 0 | Status: DISCONTINUED | OUTPATIENT
Start: 2022-03-31 | End: 2022-04-02

## 2022-03-31 RX ORDER — LEVETIRACETAM 250 MG/1
1 TABLET, FILM COATED ORAL
Qty: 0 | Refills: 0 | DISCHARGE

## 2022-03-31 RX ORDER — DEXTROSE 50 % IN WATER 50 %
25 SYRINGE (ML) INTRAVENOUS ONCE
Refills: 0 | Status: DISCONTINUED | OUTPATIENT
Start: 2022-03-31 | End: 2022-04-06

## 2022-03-31 RX ORDER — PANTOPRAZOLE SODIUM 20 MG/1
8 TABLET, DELAYED RELEASE ORAL
Qty: 80 | Refills: 0 | Status: DISCONTINUED | OUTPATIENT
Start: 2022-03-31 | End: 2022-04-01

## 2022-03-31 RX ORDER — CEFEPIME 1 G/1
1000 INJECTION, POWDER, FOR SOLUTION INTRAMUSCULAR; INTRAVENOUS ONCE
Refills: 0 | Status: COMPLETED | OUTPATIENT
Start: 2022-03-31 | End: 2022-03-31

## 2022-03-31 RX ORDER — LEVETIRACETAM 250 MG/1
500 TABLET, FILM COATED ORAL
Refills: 0 | Status: DISCONTINUED | OUTPATIENT
Start: 2022-03-31 | End: 2022-04-01

## 2022-03-31 RX ORDER — ONDANSETRON 8 MG/1
4 TABLET, FILM COATED ORAL EVERY 8 HOURS
Refills: 0 | Status: DISCONTINUED | OUTPATIENT
Start: 2022-03-31 | End: 2022-04-06

## 2022-03-31 RX ORDER — PANTOPRAZOLE SODIUM 20 MG/1
80 TABLET, DELAYED RELEASE ORAL ONCE
Refills: 0 | Status: COMPLETED | OUTPATIENT
Start: 2022-03-31 | End: 2022-03-31

## 2022-03-31 RX ORDER — CEFEPIME 1 G/1
INJECTION, POWDER, FOR SOLUTION INTRAMUSCULAR; INTRAVENOUS
Refills: 0 | Status: DISCONTINUED | OUTPATIENT
Start: 2022-03-31 | End: 2022-04-06

## 2022-03-31 RX ORDER — FERROUS SULFATE 325(65) MG
1 TABLET ORAL
Qty: 0 | Refills: 0 | DISCHARGE

## 2022-03-31 RX ORDER — CHOLECALCIFEROL (VITAMIN D3) 125 MCG
1 CAPSULE ORAL
Qty: 0 | Refills: 0 | DISCHARGE

## 2022-03-31 RX ORDER — SODIUM CHLORIDE 9 MG/ML
1000 INJECTION, SOLUTION INTRAVENOUS ONCE
Refills: 0 | Status: COMPLETED | OUTPATIENT
Start: 2022-03-31 | End: 2022-03-31

## 2022-03-31 RX ADMIN — SODIUM CHLORIDE 1000 MILLILITER(S): 9 INJECTION INTRAMUSCULAR; INTRAVENOUS; SUBCUTANEOUS at 16:25

## 2022-03-31 RX ADMIN — SODIUM CHLORIDE 1000 MILLILITER(S): 9 INJECTION INTRAMUSCULAR; INTRAVENOUS; SUBCUTANEOUS at 18:00

## 2022-03-31 RX ADMIN — SODIUM CHLORIDE 75 MILLILITER(S): 9 INJECTION, SOLUTION INTRAVENOUS at 23:32

## 2022-03-31 RX ADMIN — SODIUM CHLORIDE 1000 MILLILITER(S): 9 INJECTION INTRAMUSCULAR; INTRAVENOUS; SUBCUTANEOUS at 15:57

## 2022-03-31 RX ADMIN — PANTOPRAZOLE SODIUM 80 MILLIGRAM(S): 20 TABLET, DELAYED RELEASE ORAL at 17:03

## 2022-03-31 RX ADMIN — PANTOPRAZOLE SODIUM 10 MG/HR: 20 TABLET, DELAYED RELEASE ORAL at 18:00

## 2022-03-31 RX ADMIN — CEFEPIME 100 MILLIGRAM(S): 1 INJECTION, POWDER, FOR SOLUTION INTRAMUSCULAR; INTRAVENOUS at 23:32

## 2022-03-31 RX ADMIN — SODIUM CHLORIDE 1000 MILLILITER(S): 9 INJECTION, SOLUTION INTRAVENOUS at 19:00

## 2022-03-31 NOTE — H&P ADULT - PROBLEM SELECTOR PLAN 3
leukocytosis   WBC >50k   repeat labs after transfusion   afebrile , pending cultures Patient presented with labs concerning for dehydration; and hyperca of 11.8 -> corrected 12.6  No EKG changes   Repeat labs after transfusion/ IVF  F/u Vitamin D, PTH, PTHrP for possible malignancy

## 2022-03-31 NOTE — ED PROVIDER NOTE - OBJECTIVE STATEMENT
Patient sent in from NH for rectal bleeding. Per EMS, bed was full of blood. Patient hypotensive. Unable to give ROS 2/2 AMS.

## 2022-03-31 NOTE — H&P ADULT - PROBLEM SELECTOR PLAN 2
Hypotension likely in the setting of GIB  presented with melena   HGB 12.7 likely hemoconcentrated   s/p 2 u prbc in ED pending 1 u more   fu repeat CBC post transfusion   gi Dr. Ibrahim consulted Hypotension likely in the setting of GIB  presented with melena   HGB 12.7 likely hemoconcentrated   S/p 2 u prbc in ED, and 3L isotonic fluids  -> BP stabilized   Repeat CBC/CMP post transfusion   GI Dr. Ibrahim consulted

## 2022-03-31 NOTE — ED PROVIDER NOTE - CPE EDP EYES NORM
C/w HSQ due to renal failure   IMPROVE VTE Individual Risk Assessment  RISK                                                                Points  [  ] Previous VTE                                                  3  [  ] Thrombophilia                                               2  [x  ] Lower limb paralysis                                      2        (unable to hold up >15 seconds)    [  ] Current Cancer                                              2         (within 6 months)  [ x ] Immobilization > 24 hrs                                1  [  ] ICU/CCU stay > 24 hours                              1  [  ] Age > 60                                                      1  IMPROVE VTE Score ___3_____ C/w HSQ due to renal failure   IMPROVE VTE Individual Risk Assessment  RISK                                                                Points  [  ] Previous VTE                                                  3  [  ] Thrombophilia                                               2  [x  ] Lower limb paralysis                                      2        (unable to hold up >15 seconds)    [  ] Current Cancer                                              2         (within 6 months)  [ x ] Immobilization > 24 hrs                                1  [  ] ICU/CCU stay > 24 hours                              1  [  ] Age > 60                                                      1  IMPROVE VTE Score ___3_____ normal...

## 2022-03-31 NOTE — H&P ADULT - PROBLEM/PLAN-6
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  Staging workup revealed stage IIE disease.  Patient was started on definitive chemotherapy with R CHOP April 8, 2019.  The patient completed cycle #6 June 22, 2019.  The patient is here today for scheduled follow-up visit.    SUBJECTIVE: The patient is here today by herself.  She is doing fairly well.  Denies any problem chewing food no mouth pain denies any fever chills no night sweats.    PAST MEDICAL HISTORY/SOCIAL HISTORY/FAMILY HISTORY: Reviewed by me and unchanged from my documentation done on 11/25/19.    Review of Systems   Constitutional: Positive for fatigue. Negative for activity change, appetite change, chills, fever and unexpected weight change.   HENT: Negative for hearing loss, mouth sores, nosebleeds, sore throat and trouble swallowing.    Eyes: Negative for visual disturbance.   Respiratory: Negative for cough, chest tightness, shortness of breath and wheezing.    Cardiovascular: Negative for chest pain, palpitations and leg swelling.   Gastrointestinal: Negative for abdominal distention, abdominal pain, blood in stool, constipation, diarrhea, nausea, rectal pain and vomiting.   Endocrine: Negative for cold intolerance and heat intolerance.   Genitourinary: Positive for frequency. Negative for difficulty urinating, dysuria and urgency.   Musculoskeletal: Negative for arthralgias, back pain, gait problem, joint swelling and myalgias.   Skin: Negative for rash.   Neurological: Negative for dizziness, tremors, syncope, weakness, light-headedness, numbness and headaches.   Hematological: Negative for adenopathy. Does not bruise/bleed easily.   Psychiatric/Behavioral: Negative for confusion, sleep disturbance and suicidal  "ideas. The patient is not nervous/anxious.          Current Outpatient Medications:   •  CALCIUM PO, Take 600 mg by mouth Daily., Disp: , Rfl:   •  furosemide (LASIX) 20 MG tablet, Take 1 tablet by mouth As Needed (fluid retention). For fluid retention, Disp: 10 tablet, Rfl: 0  •  meloxicam (MOBIC) 7.5 MG tablet, Take 1 tablet by mouth Daily., Disp: 30 tablet, Rfl: 0  •  Multiple Vitamins-Minerals (MULTIVITAMIN ADULT PO), Take 1 tablet by mouth Daily., Disp: , Rfl:   •  omeprazole (priLOSEC) 20 MG capsule, Take 1 capsule by mouth Daily. At bedtime, Disp: 90 capsule, Rfl: 3  •  oxybutynin XL (DITROPAN XL) 15 MG 24 hr tablet, Take 1 tablet by mouth Daily., Disp: 30 tablet, Rfl: 11    PHYSICAL EXAMINATION:   /70   Pulse 71   Temp 97.8 °F (36.6 °C) (Temporal)   Resp 20   Ht 160 cm (63\")   Wt 62.5 kg (137 lb 12.8 oz)   SpO2 100%   BMI 24.41 kg/m²    ECOG Performance Status: 1 - Symptomatic but completely ambulatory  General Appearance:  alert, cooperative, no apparent distress and appears stated age   Neurologic/Psychiatric: A&O x 3, gait steady, appropriate affect, strength 5/5 in all muscle groups   HEENT:  Normocephalic, without obvious abnormality, mucous membranes moist   Neck: Supple, symmetrical, trachea midline, no adenopathy;  No thyromegaly, masses, or tenderness   Lungs:   Clear to auscultation bilaterally; respirations regular, even, and unlabored bilaterally   Heart:  Regular rate and rhythm, no murmurs appreciated   Abdomen:   Soft, non-tender, non-distended and no organomegaly   Lymph nodes: No cervical, supraclavicular, inguinal or axillary adenopathy noted   Extremities: Normal, atraumatic; no clubbing, cyanosis, or edema    Skin: No rashes, ulcers, or suspicious lesions noted     No visits with results within 2 Week(s) from this visit.   Latest known visit with results is:   Lab on 08/23/2019   Component Date Value Ref Range Status   • Glucose 08/23/2019 86  65 - 99 mg/dL Final   • BUN " 08/23/2019 13  8 - 23 mg/dL Final   • Creatinine 08/23/2019 0.62  0.57 - 1.00 mg/dL Final   • Sodium 08/23/2019 141  136 - 145 mmol/L Final   • Potassium 08/23/2019 4.3  3.5 - 5.2 mmol/L Final   • Chloride 08/23/2019 105  98 - 107 mmol/L Final   • CO2 08/23/2019 24.0  22.0 - 29.0 mmol/L Final   • Calcium 08/23/2019 9.3  8.6 - 10.5 mg/dL Final   • Total Protein 08/23/2019 6.8  6.0 - 8.5 g/dL Final   • Albumin 08/23/2019 4.30  3.50 - 5.20 g/dL Final   • ALT (SGPT) 08/23/2019 21  1 - 33 U/L Final   • AST (SGOT) 08/23/2019 32  1 - 32 U/L Final   • Alkaline Phosphatase 08/23/2019 55  39 - 117 U/L Final   • Total Bilirubin 08/23/2019 0.5  0.2 - 1.2 mg/dL Final   • eGFR Non African Amer 08/23/2019 95  >60 mL/min/1.73 Final   • Globulin 08/23/2019 2.5  gm/dL Final   • A/G Ratio 08/23/2019 1.7  g/dL Final   • BUN/Creatinine Ratio 08/23/2019 21.0  7.0 - 25.0 Final   • Anion Gap 08/23/2019 12.0  5.0 - 15.0 mmol/L Final   • WBC 08/23/2019 2.90* 3.40 - 10.80 10*3/mm3 Final   • RBC 08/23/2019 3.03* 3.77 - 5.28 10*6/mm3 Final   • Hemoglobin 08/23/2019 9.9* 12.0 - 15.9 g/dL Final   • Hematocrit 08/23/2019 30.1* 34.0 - 46.6 % Final   • RDW 08/23/2019 14.6  12.3 - 15.4 % Final   • MCV 08/23/2019 99.4* 79.0 - 97.0 fL Final   • MCH 08/23/2019 32.7  26.6 - 33.0 pg Final   • MCHC 08/23/2019 32.9  31.5 - 35.7 g/dL Final   • MPV 08/23/2019 6.2  6.0 - 12.0 fL Final   • Platelets 08/23/2019 233  140 - 450 10*3/mm3 Final   • Neutrophil % 08/23/2019 47.4  42.7 - 76.0 % Final   • Lymphocyte % 08/23/2019 38.2  19.6 - 45.3 % Final   • Monocyte % 08/23/2019 14.4* 5.0 - 12.0 % Final   • Neutrophils, Absolute 08/23/2019 1.40* 1.70 - 7.00 10*3/mm3 Final   • Lymphocytes, Absolute 08/23/2019 1.10  0.70 - 3.10 10*3/mm3 Final   • Monocytes, Absolute 08/23/2019 0.40  0.10 - 0.90 10*3/mm3 Final   • TSH 08/23/2019 1.520  0.270 - 4.200 mIU/mL Final        No results found.    ASSESSMENT: The patient is a very pleasant 72 y.o. female  with mandible diffuse  large B-cell lymphoma.    PROBLEM LIST:  1. Mandible diffuse large B-cell lymphoma, stage IIE:  A.  Presented with gum pain and swelling  B.  CT facial bones done on February 26, 2019 revealed 2.3 cm lucency in the anterior mandible  C.  Status post biopsy done on March 7, 2019 consistent with diffuse large B-cell lymphoma  D.  Bone marrow biopsy done on March 25, 2019 no evidence of lymphoma involvement.  E.  Whole body PET scan done on March 28, 2019 revealed hypermetabolic activity in the right curtis-mandible as well as jugular cervical chains hypermetabolic lymphadenopathy level 2 and level 3.  F. Started chemotegrapy with R-CHOP April 2019,status post 6 cycles, completed July 22, 2019.  2.  Osteoarthritis  3.  Chemotherapy-induced nausea  4.  Deep venous catheter  5.  Chemotherapy-induced constipation  6.  Thrombocytosis, reactive  7. Urinary frequency    PLAN:  1.  I did go over the scan results with the patient and her  reassured and there is no evidence of recurrent lymphoma.  2.  The patient will follow-up with us in 3 months   3.  Repeat scans on return.  4.  The patient had her port removed.  5.  The patient was advised to follow-up with her oral surgeon at  for gum examination that needs to be done periodically every 4 to 6 months.  6.  I will continue the patient on Zofran as needed for chemotherapy-induced nausea.  7.  We will continue routine port care with use of EMLA cream prior to access.   8.  We will continue Mobic as needed for osteoarthritis.    9. We will continue bowel regimen for constipation.  10. She will continue oxybutynin 10 mg for over active bladder.     Joan Zuluaga MD  11/25/2019   DISPLAY PLAN FREE TEXT

## 2022-03-31 NOTE — PATIENT PROFILE ADULT - FALL HARM RISK - HARM RISK INTERVENTIONS

## 2022-03-31 NOTE — H&P ADULT - HISTORY OF PRESENT ILLNESS
Patient is a 87 y/o male, baseline AA0x1 and minimally ambulatory, with significant medical history of HTN, T2DM, PVOD, COPD, AAA (5.2cm), Tuberculosis, Seizure disorder, Anxiety, Vascular Dementia, Prior SAH/SDH, Iron Deficiency Anemia, Hypothyroidism, and Urinary Incontinence presented from [facility] with complaints of melena. The patient was very altered while in the ED and unable to provide any history or ROS; collateral history was obtained from facility records and Sister (Mrs. Candelaria Martin), and niece (Mrs. Viviana Moon). They reported that they had just found out about the melena today, and that he has no prior history of GIB in the past. As per med rec from facility, patient is not on ASA, or any blood thinners. Patient  was noted to be hypotensive to 60/38, , temp 98.7, and saturating 96% on RA. Initial labs showed a lactate of 6.1, WBC of 57, and Hb of 12.7; Pt was transfused 2U PRBC in and given 3L isotonic fluids upon which his BP improved. ICU was consulted initially for septic shock +/- acute GIB but as patient was fluid responsive and regained some mental status, he was found to be stable enough to go to general floor for further workup.

## 2022-03-31 NOTE — H&P ADULT - ASSESSMENT
Patient is a 85 y/o male, baseline AA0x1 and minimally ambulatory, with significant medical history of HTN, T2DM, PVOD, COPD, AAA (5.2cm), Tuberculosis, Seizure disorder, Anxiety, Vascular Dementia, Prior SAH/SDH, Iron Deficiency Anemia, Hypothyroidism, and Urinary Incontinence presented from [facility] with complaints of melena. Admitted for severe sepsis of unknown source in the setting of GI Bleed and acute dehydration.    In the ED:     Patient is a 87 y/o male, baseline AA0x1 and minimally ambulatory, with significant medical history of HTN, T2DM, PVOD, COPD, AAA (5.2cm), Tuberculosis, Seizure disorder, Anxiety, Vascular Dementia, Prior SAH/SDH, Iron Deficiency Anemia, Hypothyroidism, and Urinary Incontinence presented from [facility] with complaints of melena. Admitted for severe sepsis of unknown source in the setting of GI Bleed and acute dehydration.    In the ED:  VS: Hypotensive to 70/30mmHg initially improved to 130s/80s s/p PRBC + IVF   tachy to 100's   S/p Levoflox, PPI ggt, isontonic fluids x 3L, 2 PRBC   Labs significant for WBC 57, Hb 12.7 myelocyte 2.0%, lactate 6.2, Ca 11.8, corrected 12.6  CXR with small left lung granuloma

## 2022-03-31 NOTE — CONSULT NOTE ADULT - ASSESSMENT
85 yo male PMH of tuberculosis, htn, seizures, anxiety, COPD, DM, vascular dementia, PVD, iron deficiency anemia, hypothyroidism presented from [facility] with complaints of melena. Pt is unable to provide hx and per sister and niece they just found out about the melena today. Pt has no hx of GIB in the past, not on asa, or blood thinners. In ED pt was noted to be hypotensive to 60/38  temp 98.7 spo96%. Hgb was 12.7 -> pt was transfused 2 u prbc in ED and s. blood pressure improved to 130's. Icu was consulted for further care.        Neuro:  Vascular dementia   Pt is in no acute distress  non verbal in ED       Cardiovascular:    Pulmonary:     Infectious Diseases:    Gastrointestinal:    Renal:    Endo:    Heme/onc:     Skin/ catheter:     Prophylaxis: SCD and PPI     Goals of Care: DNR/DNI/no central line or feeding tubes     Dispo: General Floor    85 yo male PMH of tuberculosis, htn, seizures, anxiety, COPD, DM, vascular dementia, PVD, iron deficiency anemia, hypothyroidism presented from [facility] with complaints of melena. Pt is unable to provide hx and per sister and niece they just found out about the melena today. Pt has no hx of GIB in the past, not on asa, or blood thinners. In ED pt was noted to be hypotensive to 60/38  temp 98.7 spo96%. Hgb was 12.7 -> pt was transfused 2 u prbc in ED and s. blood pressure improved to 130's. Icu was consulted for further care.        Neuro:  Vascular dementia   Pt is in no acute distress  non verbal in ED       Cardiovascular:  hypotension likely in the setting of GIB  presented with melena   HGB 12.7 likely hemoconcentrated   s/p 2 u prbc in ED pending 1 u more   fu repeat CBC post transfusion   gi Dr. Ibrahim consulted     Pulmonary:   no active issues     Infectious Diseases:  WBC 50k?  hemoconcentrated   repeat     Gastrointestinal:    Renal:    Endo:    Heme/onc:     Skin/ catheter:     Prophylaxis: SCD and PPI     Goals of Care: DNR/DNI/no central line or feeding tubes     Dispo: General Floor    85 yo male PMH of tuberculosis, htn, seizures, anxiety, COPD, DM, vascular dementia, PVD, iron deficiency anemia, hypothyroidism presented from [facility] with complaints of melena. Pt is unable to provide hx and per sister and niece they just found out about the melena today. Pt has no hx of GIB in the past, not on asa, or blood thinners. In ED pt was noted to be hypotensive to 60/38  temp 98.7 spo96%. Hgb was 12.7 -> pt was transfused 2 u prbc in ED and s. blood pressure improved to 130's. Icu was consulted for further care.        Neuro:  Vascular dementia   Pt is in no acute distress  non verbal in ED       Cardiovascular:  hypotension likely in the setting of GIB  presented with melena   HGB 12.7 likely hemoconcentrated   s/p 2 u prbc in ED pending 1 u more   fu repeat CBC post transfusion   gi Dr. Ibrahim consulted     Pulmonary:   no active issues     Infectious Diseases:  WBC 50k?  hemoconcentrated   repeat cbc after transfusion     Gastrointestinal:  Suspected GIB   plan as above   keep NPO, started on PPI   GI following    Renal:  ARCHANA   cr 2.15  bs normal   would recommend sending urine lytes       Endo:  finger stick q6h while NPO   send a1c     Heme/onc:   leukocytosis   WBC >50k   repeat labs after transfusion   afebrile , pending cultures     anemia   hgb pending after transfusion   sending anemia panel     Skin/ catheter: 1 peripheral IV, placing a 2nd one     Prophylaxis: SCD and PPI     Goals of Care: DNR/DNI/no central line or feeding tubes     Dispo: General Floor

## 2022-03-31 NOTE — CONSULT NOTE ADULT - SUBJECTIVE AND OBJECTIVE BOX
Patient is a 86y old  Male who presents with a chief complaint of     HPI:   85 yo male PMH of tuberculosis, htn, seizures, anxiety, COPD, DM, vascular dementia, PVD, iron deficiency anemia, hypothyroidism presented from [facility] with complaints of melena. Pt is unable to provide hx and per sister and niece they just found out about the melena today. Pt has no hx of GIB in the past, not on asa, or blood thinners. In ED pt was noted to be hypotensive to 60/38  temp 98.7 spo96%. Hgb was 12.7 -> pt was transfused 2 u prbc in ED and s. blood pressure improved to 130's. Icu was consulted for further care.      Allergies    No Known Allergies    Intolerances        MEDICATIONS  (STANDING):  pantoprazole Infusion 8 mG/Hr (10 mL/Hr) IV Continuous <Continuous>    MEDICATIONS  (PRN):      Daily Height in cm: 172.72 (31 Mar 2022 15:16)    Daily     Drug Dosing Weight  Height (cm): 172.7 (31 Mar 2022 15:16)  Weight (kg): 54.3 (31 Mar 2022 15:16)  BMI (kg/m2): 18.2 (31 Mar 2022 15:16)  BSA (m2): 1.64 (31 Mar 2022 15:16)    PAST MEDICAL & SURGICAL HISTORY:  Dementia    Seizure    Anemia    Depression    HTN (hypertension)    Osteoarthritis    Osteoporosis    Diabetes mellitus    Seizure    T2DM (type 2 diabetes mellitus)    Seizures    Vitamin D deficiency    HTN (hypertension)    History of atherosclerosis    PVD (peripheral vascular disease)    Vascular dementia    Chronic obstructive pulmonary disease, unspecified COPD type    DM (diabetes mellitus)    No significant past surgical history        FAMILY HISTORY:  No pertinent family history in first degree relatives    Patient&#x27;s mother is         SOCIAL HISTORY: unable to obtain     ADVANCE DIRECTIVES: DNR/DNI/no central line or feeding tubes    REVIEW OF SYSTEMS:  unable to obtain as pt is not talking, nods no to abdominal pain, chest pain, sob, or pain any where else             ICU Vital Signs Last 24 Hrs  T(C): 37.6 (31 Mar 2022 16:43), Max: 37.6 (31 Mar 2022 16:43)  T(F): 99.6 (31 Mar 2022 16:43), Max: 99.6 (31 Mar 2022 16:43)  HR: 106 (31 Mar 2022 16:43) (103 - 119)  BP: 153/61 (31 Mar 2022 16:43) (60/38 - 153/61)  BP(mean): --  ABP: --  ABP(mean): --  RR: 20 (31 Mar 2022 16:43) (20 - 26)  SpO2: 100% (31 Mar 2022 16:43) (96% - 100%)          I&O's Detail      PHYSICAL EXAM:    GENERAL: NAD, appears comfortable   HEAD:  Atraumatic, Normocephalic  EYES: EOMI, PERRLA, conjunctiva and sclera clear  ENMT: No tonsillar erythema, exudates, or enlargement; Moist mucous membranes   NECK: Supple, No JVD, Normal thyroid  NERVOUS SYSTEM:  Awake and follows commands appropriately, non verbal   CHEST/LUNG: Clear to percussion bilaterally; No rales, rhonchi, wheezing, or rubs  HEART: Regular rate and rhythm; No murmurs, rubs, or gallops  ABDOMEN: Soft, Nontender, Nondistended; Bowel sounds present  EXTREMITIES:  2+ Peripheral Pulses, No clubbing, cyanosis, or edema  LYMPH: No lymphadenopathy noted  SKIN: No rashes or lesions    LABS:  CBC Full  -  ( 31 Mar 2022 15:34 )  WBC Count : 57.00 K/uL  RBC Count : 3.66 M/uL  Hemoglobin : 12.7 g/dL  Hematocrit : 38.4 %  Platelet Count - Automated : 284 K/uL  Mean Cell Volume : 104.9 fl  Mean Cell Hemoglobin : 34.7 pg  Mean Cell Hemoglobin Concentration : 33.1 gm/dL  Auto Neutrophil # : 46.74 K/uL  Auto Lymphocyte # : 2.28 K/uL  Auto Monocyte # : 4.56 K/uL  Auto Eosinophil # : 0.57 K/uL  Auto Basophil # : 0.00 K/uL  Auto Neutrophil % : 79.0 %  Auto Lymphocyte % : 4.0 %  Auto Monocyte % : 8.0 %  Auto Eosinophil % : 1.0 %  Auto Basophil % : 0.0 %        133<L>  |  102  |  33<H>  ----------------------------<  170<H>  5.6<H>   |  20<L>  |  2.15<H>    Ca    11.8<H>      31 Mar 2022 15:34    TPro  7.9  /  Alb  3.4<L>  /  TBili  0.9  /  DBili  x   /  AST  28  /  ALT  22  /  AlkPhos  73  03-31    CAPILLARY BLOOD GLUCOSE                    EKG:    ECHO, US:    RADIOLOGY: CXR with Small LL granuloma     CRITICAL CARE TIME SPENT: 45 mins    Patient is a 86y old  Male who presents with a chief complaint of     HPI:   85 yo male PMH of tuberculosis, htn, seizures, anxiety, COPD, DM, vascular dementia, PVD, iron deficiency anemia, hypothyroidism presented from [facility] with complaints of melena. Pt is unable to provide hx and per sister and niece they just found out about the melena today. Pt has no hx of GIB in the past, not on asa, or blood thinners. In ED pt was noted to be hypotensive to 60/38  temp 98.7 spo96%. Hgb was 12.7 -> pt was transfused 2 u prbc in ED and s. blood pressure improved to 130's. Icu was consulted for further care.      Allergies    No Known Allergies    Intolerances        MEDICATIONS  (STANDING):  pantoprazole Infusion 8 mG/Hr (10 mL/Hr) IV Continuous <Continuous>    MEDICATIONS  (PRN):      Daily Height in cm: 172.72 (31 Mar 2022 15:16)    Daily     Drug Dosing Weight  Height (cm): 172.7 (31 Mar 2022 15:16)  Weight (kg): 54.3 (31 Mar 2022 15:16)  BMI (kg/m2): 18.2 (31 Mar 2022 15:16)  BSA (m2): 1.64 (31 Mar 2022 15:16)    PAST MEDICAL & SURGICAL HISTORY:  Dementia    Seizure    Anemia    Depression    HTN (hypertension)    Osteoarthritis    Osteoporosis    Diabetes mellitus    Seizure    T2DM (type 2 diabetes mellitus)    Seizures    Vitamin D deficiency    HTN (hypertension)    History of atherosclerosis    PVD (peripheral vascular disease)    Vascular dementia    Chronic obstructive pulmonary disease, unspecified COPD type    DM (diabetes mellitus)    No significant past surgical history        FAMILY HISTORY:  No pertinent family history in first degree relatives    Patient&#x27;s mother is         SOCIAL HISTORY: unable to obtain     ADVANCE DIRECTIVES: DNR/DNI/no central line or feeding tubes    REVIEW OF SYSTEMS:  unable to obtain as pt is not talking, nods no to abdominal pain, chest pain, sob, or pain any where else             ICU Vital Signs Last 24 Hrs  T(C): 37.6 (31 Mar 2022 16:43), Max: 37.6 (31 Mar 2022 16:43)  T(F): 99.6 (31 Mar 2022 16:43), Max: 99.6 (31 Mar 2022 16:43)  HR: 106 (31 Mar 2022 16:43) (103 - 119)  BP: 153/61 (31 Mar 2022 16:43) (60/38 - 153/61)  BP(mean): --  ABP: --  ABP(mean): --  RR: 20 (31 Mar 2022 16:43) (20 - 26)  SpO2: 100% (31 Mar 2022 16:43) (96% - 100%)          I&O's Detail      PHYSICAL EXAM:    GENERAL: NAD, appears comfortable   HEAD:  Atraumatic, Normocephalic  EYES: EOMI, PERRLA, conjunctiva and sclera clear  ENMT: No tonsillar erythema, exudates, or enlargement; Dry mucous membranes   NECK: Supple, No JVD, Normal thyroid  NERVOUS SYSTEM:  Awake and follows commands appropriately, non verbal   CHEST/LUNG: Clear to percussion bilaterally; No rales, rhonchi, wheezing, or rubs  HEART: Regular rate and rhythm; No murmurs, rubs, or gallops  ABDOMEN: Soft, Nontender, Nondistended; Bowel sounds present  EXTREMITIES:  2+ Peripheral Pulses, No clubbing, cyanosis, or edema, 4 fingers on left hand. arthritic changes noted on left hand   LYMPH: No lymphadenopathy noted  SKIN: increased turgor     LABS:  CBC Full  -  ( 31 Mar 2022 15:34 )  WBC Count : 57.00 K/uL  RBC Count : 3.66 M/uL  Hemoglobin : 12.7 g/dL  Hematocrit : 38.4 %  Platelet Count - Automated : 284 K/uL  Mean Cell Volume : 104.9 fl  Mean Cell Hemoglobin : 34.7 pg  Mean Cell Hemoglobin Concentration : 33.1 gm/dL  Auto Neutrophil # : 46.74 K/uL  Auto Lymphocyte # : 2.28 K/uL  Auto Monocyte # : 4.56 K/uL  Auto Eosinophil # : 0.57 K/uL  Auto Basophil # : 0.00 K/uL  Auto Neutrophil % : 79.0 %  Auto Lymphocyte % : 4.0 %  Auto Monocyte % : 8.0 %  Auto Eosinophil % : 1.0 %  Auto Basophil % : 0.0 %        133<L>  |  102  |  33<H>  ----------------------------<  170<H>  5.6<H>   |  20<L>  |  2.15<H>    Ca    11.8<H>      31 Mar 2022 15:34    TPro  7.9  /  Alb  3.4<L>  /  TBili  0.9  /  DBili  x   /  AST  28  /  ALT  22  /  AlkPhos  73      CAPILLARY BLOOD GLUCOSE                    EKG:    ECHO, US:    RADIOLOGY: CXR with Small LL granuloma     CRITICAL CARE TIME SPENT: 45 mins

## 2022-03-31 NOTE — H&P ADULT - PROBLEM SELECTOR PLAN 1
WBC 50k?  hemoconcentrated   repeat cbc after transfusion Patient presenting with Hypotension, tachycardia, lactate 6.2, WBC 57, Odell 79.0%  BP is fluid responsive s/p 3L isotonic fluids and 2 PRBC   Unclear source of sepsis; Labs may also be complicated by hemoconcentration  Trend lactate till <2, and bolus as necessary; c/w IVF @75   F/u CBC, CMP post transfusion Patient presenting with Hypotension, tachycardia, lactate 6.2, WBC 57, Doell 79.0%  BP is fluid responsive s/p 3L isotonic fluids and 2 PRBC   Unclear source of sepsis; Labs may also be complicated by hemoconcentration  Trend lactate till <2, and bolus as necessary; c/w IVF @75   S/p Levoflox in ED, will C/w Cefepime renally dosed for CrCl of 19   F/u BCx, UCx, CT abdomen/Chest Non con to ID source   F/u CBC, CMP post transfusion  ID Dr. Monson   Possibly from new blood dyscrasia (? metamyelocytes in smear) > Will consult  in AM if labs still concerning

## 2022-03-31 NOTE — H&P ADULT - ATTENDING COMMENTS
Patient is a 87 y/o male, baseline AA0x1 and minimally ambulatory, with significant medical history of HTN, T2DM, PVOD, COPD, AAA (5.2cm), Tuberculosis, Seizure disorder, Anxiety, Vascular Dementia, Prior SAH/SDH, Iron Deficiency Anemia, Hypothyroidism, and Urinary Incontinence presented from [facility] with complaints of melena. The patient was very altered while in the ED and unable to provide any history or ROS; collateral history was obtained from facility records and Sister (Mrs. Candelaria Martin), and niece (Mrs. Viviana Moon). They reported that they had just found out about the melena today, and that he has no prior history of GIB in the past. As per med rec from facility, patient is not on ASA, or any blood thinners. Patient  was noted to be hypotensive to 60/38, , temp 98.7, and saturating 96% on RA. Initial labs showed a lactate of 6.1, WBC of 57, and Hb of 12.7; Pt was transfused 2U PRBC in and given 3L isotonic fluids upon which his BP improved. ICU was consulted initially for septic shock +/- acute GIB but as patient was fluid responsive and regained some mental status, he was found to be stable enough to go to general floor for further workup.     # HYPOTENSION SUSPECTED SEPSIS [UNCLEAR SOURCE] + HYPOVOLEMIA  # ?MELENA, UNDERLYING NALLELY - ACUTE ON CHRONIC ANEMIA  - PLACED ON BROADSPECTRUM ABX - CEFEPIME, F/U BCX AND UCX  - F/U CT CHEST/ABD/PELVIS  - PLACED ON PPI DRIP, NPO, IVF  - S/P PRBC TRANSFUSION, TRANSFUSION THRESHOLD HGB < 7  - S/P CRITICAL CARE CONSULT, GASTROENTEROLOGY CONSULT, ID CONSULT    # LEUKOCYTOSIS - R/O LEUKEMIA - F/U REPEAT CBC  - HEME/ONC CONSULT PENDING REPEAT CBC    # ARCHANA - PLACED ON IVF, REVIEWED UA, MONITOR CR, AVOID NEPHROTOXIC AGENTS    # ELEVATED LACTIC ACID - TRENDING LACTIC ACID    # HYPERKALEMIA - LOKELMA, F/U REPEAT POTASSIUM    # HYPERCALCEMIA - SUSPECT S/T DEHYDRATION - F/U IONIZED CA, F/U PTH, F/U PTHRP, F/U VIT D    # ADVANCED VASCULAR DEMENTIA, HX OF SAH/SDH    # HTN  # T2DM  # PVOD  # COPD  # AAA  # HX OF TB  # SEIZURE DISORDER  # HYPOTHYROIDISM  # URINARY INCONTINENCE  # GI PPX Patient is a 85 y/o male, baseline AA0x1 and minimally ambulatory, with significant medical history of HTN, T2DM, PVOD, COPD, AAA (5.2cm), Tuberculosis, Seizure disorder, Anxiety, Vascular Dementia, Prior SAH/SDH, Iron Deficiency Anemia, Hypothyroidism, and Urinary Incontinence presented from [facility] with complaints of melena. The patient was very altered while in the ED and unable to provide any history or ROS; collateral history was obtained from facility records and Sister (Mrs. Candelaria Martin), and niece (Mrs. Viviana Moon). They reported that they had just found out about the melena today, and that he has no prior history of GIB in the past. As per med rec from facility, patient is not on ASA, or any blood thinners. Patient  was noted to be hypotensive to 60/38, , temp 98.7, and saturating 96% on RA. Initial labs showed a lactate of 6.1, WBC of 57, and Hb of 12.7; Pt was transfused 2U PRBC in and given 3L isotonic fluids upon which his BP improved. ICU was consulted initially for septic shock +/- acute GIB but as patient was fluid responsive and regained some mental status, he was found to be stable enough to go to general floor for further workup.     # HYPOTENSION SUSPECTED SEPSIS [UNCLEAR SOURCE] + HYPOVOLEMIA  # ?MELENA, UNDERLYING NALLELY - ACUTE ON CHRONIC ANEMIA  - PLACED ON BROADSPECTRUM ABX - CEFEPIME, F/U BCX AND UCX  - F/U CT CHEST/ABD/PELVIS  - PLACED ON PPI DRIP, NPO, IVF  - S/P PRBC TRANSFUSION, TRANSFUSION THRESHOLD HGB < 7  - S/P CRITICAL CARE CONSULT, GASTROENTEROLOGY CONSULT, ID CONSULT    # LEUKOCYTOSIS - R/O LEUKEMIA - F/U REPEAT CBC  - HEME/ONC CONSULT PENDING REPEAT CBC    # ARCHANA - PLACED ON IVF, REVIEWED UA, MONITOR CR, AVOID NEPHROTOXIC AGENTS    # ELEVATED LACTIC ACID - TRENDING LACTIC ACID    # HYPERKALEMIA - LOKELMA, F/U REPEAT POTASSIUM    # HYPERCALCEMIA - SUSPECT S/T DEHYDRATION - F/U IONIZED CA, F/U PTH, F/U PTHRP, F/U VIT D    # ACUTE ENCEPHALOPATHY ON ADVANCED VASCULAR DEMENTIA, HX OF SAH/SDH - PATIENT IMPROVED TO ? BASELINE AFTER IVF AND PRBC    # HTN  # T2DM  # PVOD  # COPD  # AAA  # HX OF TB  # SEIZURE DISORDER  # HYPOTHYROIDISM  # URINARY INCONTINENCE  # GI PPX

## 2022-03-31 NOTE — ED PROVIDER NOTE - NSICDXPASTMEDICALHX_GEN_ALL_CORE_FT
PAST MEDICAL HISTORY:  Anemia     Chronic obstructive pulmonary disease, unspecified COPD type     Dementia     Depression     Diabetes mellitus     DM (diabetes mellitus)     History of atherosclerosis     HTN (hypertension)     HTN (hypertension)     Osteoarthritis     Osteoporosis     PVD (peripheral vascular disease)     Seizure     Seizure     Seizures     T2DM (type 2 diabetes mellitus)     Vascular dementia     Vitamin D deficiency

## 2022-03-31 NOTE — ED PROVIDER NOTE - PROGRESS NOTE DETAILS
Spoke ot patient's niece, who is HCP. Reports MOLST should be followed (DNR/DNI, no feeding tube). Okay with transfusion. BP improved though still tachycardic. Spoke to Dr. Ibrahim who recommended ICU admission, to scope in ICU. Patient seen by ICU attending, who spoke to Dr. Ibrahim. Family unsure about endoscopy at this time. Want to wait and see how patient does before any invasive procedures. BP stable, though still mildly tachycardic. code sepsis called due to leukocytosis and tachycardia. Downgraded to floor. Endorsed to Dr. Crooks. Family understands that prognosis is very poor. Spoke to Dr. Zabala, will come see patient.

## 2022-03-31 NOTE — H&P ADULT - CONVERSATION DETAILS
Patient is a prior DNR/DNI with limited medical interventions including IV antibiotics and fluids allowed to sustain life. No feeding tubes as per family. Central line and IV pressors were discussed and family declined in light of maximizing patient comfort. Updated that patient is currently fluid responsive and will not need ICU level of care. Palliative referral made

## 2022-03-31 NOTE — CONSULT NOTE ADULT - ATTENDING COMMENTS
Patient seen and examined with ICU resident upon consultation request at 4.50PM. Data reviewed. Case and plan of care discussed and agree with note.   This is 86M with upper GI bleed s/p hypotension in the ED that resolved with 2U PRBCs and now hemodynamically stable, Hb 12.7. GI consulted and will perform upper endoscopy tomorrow. Recommend to admit to medicine service. Start PPI, monitor CBC q6hrs, maintain at least 2 large bore IV lines. Patient is DNR/DNI.

## 2022-03-31 NOTE — H&P ADULT - NSHPPHYSICALEXAM_GEN_ALL_CORE
GENERAL APPEARANCE: Malnourished  male, AA0x0, in NAD on RA   HEENT: Normocephalic, PERRL, EOMI External auditory canals clear, Unable to assess hearing as altered   THROAT: Oral cavity and pharynx normal. No inflammation, swelling, exudate, or lesions.  CARDIAC: Normal S1 and S2. No S3, S4 or murmurs. Rhythm is regular. There is no peripheral edema, cyanosis or pallor.  LUNGS: Clear to auscultation and percussion without rales, rhonchi, wheezing or diminished breath sounds.  ABDOMEN: Sluggish bowel sounds. Soft, distended, but nontender. No guarding or rebound. No masses.  EXTREMITIES: No significant deformity or joint abnormality. No edema. Peripheral pulses intact. No varicosities.  NEUROLOGICAL: Unable to assess, moving all 4 extremities spontaneously   SKIN: No skin breakdown, lesions or rashes noted

## 2022-04-01 DIAGNOSIS — I71.4 ABDOMINAL AORTIC ANEURYSM, WITHOUT RUPTURE: ICD-10-CM

## 2022-04-01 DIAGNOSIS — J18.9 PNEUMONIA, UNSPECIFIED ORGANISM: ICD-10-CM

## 2022-04-01 DIAGNOSIS — K52.9 NONINFECTIVE GASTROENTERITIS AND COLITIS, UNSPECIFIED: ICD-10-CM

## 2022-04-01 LAB
ALBUMIN SERPL ELPH-MCNC: 2.6 G/DL — LOW (ref 3.5–5)
ALBUMIN SERPL ELPH-MCNC: 2.9 G/DL — LOW (ref 3.5–5)
ALP SERPL-CCNC: 59 U/L — SIGNIFICANT CHANGE UP (ref 40–120)
ALP SERPL-CCNC: 63 U/L — SIGNIFICANT CHANGE UP (ref 40–120)
ALT FLD-CCNC: 19 U/L DA — SIGNIFICANT CHANGE UP (ref 10–60)
ALT FLD-CCNC: 20 U/L DA — SIGNIFICANT CHANGE UP (ref 10–60)
ANION GAP SERPL CALC-SCNC: 5 MMOL/L — SIGNIFICANT CHANGE UP (ref 5–17)
ANION GAP SERPL CALC-SCNC: 6 MMOL/L — SIGNIFICANT CHANGE UP (ref 5–17)
ANION GAP SERPL CALC-SCNC: 6 MMOL/L — SIGNIFICANT CHANGE UP (ref 5–17)
ANISOCYTOSIS BLD QL: SLIGHT — SIGNIFICANT CHANGE UP
APTT BLD: 23.9 SEC — LOW (ref 27.5–35.5)
AST SERPL-CCNC: 26 U/L — SIGNIFICANT CHANGE UP (ref 10–40)
AST SERPL-CCNC: 27 U/L — SIGNIFICANT CHANGE UP (ref 10–40)
BASOPHILS # BLD AUTO: 0.1 K/UL — SIGNIFICANT CHANGE UP (ref 0–0.2)
BASOPHILS NFR BLD AUTO: 0.3 % — SIGNIFICANT CHANGE UP (ref 0–2)
BILIRUB SERPL-MCNC: 0.7 MG/DL — SIGNIFICANT CHANGE UP (ref 0.2–1.2)
BILIRUB SERPL-MCNC: 1.1 MG/DL — SIGNIFICANT CHANGE UP (ref 0.2–1.2)
BUN SERPL-MCNC: 29 MG/DL — HIGH (ref 7–18)
BUN SERPL-MCNC: 33 MG/DL — HIGH (ref 7–18)
BUN SERPL-MCNC: 34 MG/DL — HIGH (ref 7–18)
C DIFF BY PCR RESULT: SIGNIFICANT CHANGE UP
C DIFF TOX GENS STL QL NAA+PROBE: SIGNIFICANT CHANGE UP
CALCIUM SERPL-MCNC: 9.3 MG/DL — SIGNIFICANT CHANGE UP (ref 8.4–10.5)
CALCIUM SERPL-MCNC: 9.4 MG/DL — SIGNIFICANT CHANGE UP (ref 8.4–10.5)
CALCIUM SERPL-MCNC: 9.6 MG/DL — SIGNIFICANT CHANGE UP (ref 8.4–10.5)
CALCIUM SERPL-MCNC: 9.8 MG/DL — SIGNIFICANT CHANGE UP (ref 8.4–10.5)
CHLORIDE SERPL-SCNC: 107 MMOL/L — SIGNIFICANT CHANGE UP (ref 96–108)
CHLORIDE SERPL-SCNC: 108 MMOL/L — SIGNIFICANT CHANGE UP (ref 96–108)
CHLORIDE SERPL-SCNC: 109 MMOL/L — HIGH (ref 96–108)
CO2 SERPL-SCNC: 25 MMOL/L — SIGNIFICANT CHANGE UP (ref 22–31)
CO2 SERPL-SCNC: 26 MMOL/L — SIGNIFICANT CHANGE UP (ref 22–31)
CO2 SERPL-SCNC: 26 MMOL/L — SIGNIFICANT CHANGE UP (ref 22–31)
CREAT SERPL-MCNC: 1.12 MG/DL — SIGNIFICANT CHANGE UP (ref 0.5–1.3)
CREAT SERPL-MCNC: 1.19 MG/DL — SIGNIFICANT CHANGE UP (ref 0.5–1.3)
CREAT SERPL-MCNC: 1.32 MG/DL — HIGH (ref 0.5–1.3)
EGFR: 53 ML/MIN/1.73M2 — LOW
EGFR: 60 ML/MIN/1.73M2 — SIGNIFICANT CHANGE UP
EGFR: 65 ML/MIN/1.73M2 — SIGNIFICANT CHANGE UP
EOSINOPHIL # BLD AUTO: 0.01 K/UL — SIGNIFICANT CHANGE UP (ref 0–0.5)
EOSINOPHIL NFR BLD AUTO: 0 % — SIGNIFICANT CHANGE UP (ref 0–6)
GIANT PLATELETS BLD QL SMEAR: PRESENT — SIGNIFICANT CHANGE UP
GLUCOSE BLDC GLUCOMTR-MCNC: 105 MG/DL — HIGH (ref 70–99)
GLUCOSE BLDC GLUCOMTR-MCNC: 118 MG/DL — HIGH (ref 70–99)
GLUCOSE BLDC GLUCOMTR-MCNC: 84 MG/DL — SIGNIFICANT CHANGE UP (ref 70–99)
GLUCOSE BLDC GLUCOMTR-MCNC: 86 MG/DL — SIGNIFICANT CHANGE UP (ref 70–99)
GLUCOSE BLDC GLUCOMTR-MCNC: 93 MG/DL — SIGNIFICANT CHANGE UP (ref 70–99)
GLUCOSE SERPL-MCNC: 106 MG/DL — HIGH (ref 70–99)
GLUCOSE SERPL-MCNC: 111 MG/DL — HIGH (ref 70–99)
GLUCOSE SERPL-MCNC: 98 MG/DL — SIGNIFICANT CHANGE UP (ref 70–99)
HCT VFR BLD CALC: 39.4 % — SIGNIFICANT CHANGE UP (ref 39–50)
HCT VFR BLD CALC: 40.5 % — SIGNIFICANT CHANGE UP (ref 39–50)
HGB BLD-MCNC: 13.1 G/DL — SIGNIFICANT CHANGE UP (ref 13–17)
HGB BLD-MCNC: 13.7 G/DL — SIGNIFICANT CHANGE UP (ref 13–17)
IMM GRANULOCYTES NFR BLD AUTO: 2.8 % — HIGH (ref 0–1.5)
INR BLD: 1.2 RATIO — HIGH (ref 0.88–1.16)
LACTATE SERPL-SCNC: 1.6 MMOL/L — SIGNIFICANT CHANGE UP (ref 0.7–2)
LACTATE SERPL-SCNC: 2.5 MMOL/L — HIGH (ref 0.7–2)
LG PLATELETS BLD QL AUTO: SLIGHT — SIGNIFICANT CHANGE UP
LYMPHOCYTES # BLD AUTO: 2.4 K/UL — SIGNIFICANT CHANGE UP (ref 1–3.3)
LYMPHOCYTES # BLD AUTO: 6 % — LOW (ref 13–44)
MACROCYTES BLD QL: SLIGHT — SIGNIFICANT CHANGE UP
MANUAL SMEAR VERIFICATION: SIGNIFICANT CHANGE UP
MCHC RBC-ENTMCNC: 32.4 PG — SIGNIFICANT CHANGE UP (ref 27–34)
MCHC RBC-ENTMCNC: 32.5 PG — SIGNIFICANT CHANGE UP (ref 27–34)
MCHC RBC-ENTMCNC: 33.2 GM/DL — SIGNIFICANT CHANGE UP (ref 32–36)
MCHC RBC-ENTMCNC: 33.8 GM/DL — SIGNIFICANT CHANGE UP (ref 32–36)
MCV RBC AUTO: 96.2 FL — SIGNIFICANT CHANGE UP (ref 80–100)
MCV RBC AUTO: 97.5 FL — SIGNIFICANT CHANGE UP (ref 80–100)
MICROCYTES BLD QL: SLIGHT — SIGNIFICANT CHANGE UP
MONOCYTES # BLD AUTO: 2.6 K/UL — HIGH (ref 0–0.9)
MONOCYTES NFR BLD AUTO: 6.5 % — SIGNIFICANT CHANGE UP (ref 2–14)
MRSA PCR RESULT.: DETECTED
NEUTROPHILS # BLD AUTO: 33.49 K/UL — HIGH (ref 1.8–7.4)
NEUTROPHILS NFR BLD AUTO: 84.4 % — HIGH (ref 43–77)
NRBC # BLD: 0 /100 WBCS — SIGNIFICANT CHANGE UP (ref 0–0)
NRBC # BLD: 0 /100 WBCS — SIGNIFICANT CHANGE UP (ref 0–0)
PLAT MORPH BLD: NORMAL — SIGNIFICANT CHANGE UP
PLATELET # BLD AUTO: 201 K/UL — SIGNIFICANT CHANGE UP (ref 150–400)
PLATELET # BLD AUTO: 213 K/UL — SIGNIFICANT CHANGE UP (ref 150–400)
POIKILOCYTOSIS BLD QL AUTO: SLIGHT — SIGNIFICANT CHANGE UP
POTASSIUM SERPL-MCNC: 4.7 MMOL/L — SIGNIFICANT CHANGE UP (ref 3.5–5.3)
POTASSIUM SERPL-MCNC: 4.9 MMOL/L — SIGNIFICANT CHANGE UP (ref 3.5–5.3)
POTASSIUM SERPL-MCNC: 5.8 MMOL/L — HIGH (ref 3.5–5.3)
POTASSIUM SERPL-SCNC: 4.7 MMOL/L — SIGNIFICANT CHANGE UP (ref 3.5–5.3)
POTASSIUM SERPL-SCNC: 4.9 MMOL/L — SIGNIFICANT CHANGE UP (ref 3.5–5.3)
POTASSIUM SERPL-SCNC: 5.8 MMOL/L — HIGH (ref 3.5–5.3)
PROT SERPL-MCNC: 6.4 G/DL — SIGNIFICANT CHANGE UP (ref 6–8.3)
PROT SERPL-MCNC: 6.6 G/DL — SIGNIFICANT CHANGE UP (ref 6–8.3)
PROTHROM AB SERPL-ACNC: 14.3 SEC — HIGH (ref 10.5–13.4)
PTH-INTACT FLD-MCNC: 34 PG/ML — SIGNIFICANT CHANGE UP (ref 15–65)
RBC # BLD: 4.04 M/UL — LOW (ref 4.2–5.8)
RBC # BLD: 4.21 M/UL — SIGNIFICANT CHANGE UP (ref 4.2–5.8)
RBC # FLD: 20.4 % — HIGH (ref 10.3–14.5)
RBC # FLD: 21.2 % — HIGH (ref 10.3–14.5)
RBC BLD AUTO: ABNORMAL
S AUREUS DNA NOSE QL NAA+PROBE: DETECTED
SODIUM SERPL-SCNC: 139 MMOL/L — SIGNIFICANT CHANGE UP (ref 135–145)
SODIUM SERPL-SCNC: 139 MMOL/L — SIGNIFICANT CHANGE UP (ref 135–145)
SODIUM SERPL-SCNC: 140 MMOL/L — SIGNIFICANT CHANGE UP (ref 135–145)
VIT D25+D1,25 OH+D1,25 PNL SERPL-MCNC: 36.5 PG/ML — SIGNIFICANT CHANGE UP (ref 19.9–79.3)
WBC # BLD: 34.83 K/UL — HIGH (ref 3.8–10.5)
WBC # BLD: 39.73 K/UL — HIGH (ref 3.8–10.5)
WBC # FLD AUTO: 34.83 K/UL — HIGH (ref 3.8–10.5)
WBC # FLD AUTO: 39.73 K/UL — HIGH (ref 3.8–10.5)

## 2022-04-01 RX ORDER — ALBUTEROL 90 UG/1
2 AEROSOL, METERED ORAL EVERY 6 HOURS
Refills: 0 | Status: DISCONTINUED | OUTPATIENT
Start: 2022-04-01 | End: 2022-04-06

## 2022-04-01 RX ORDER — SODIUM ZIRCONIUM CYCLOSILICATE 10 G/10G
10 POWDER, FOR SUSPENSION ORAL ONCE
Refills: 0 | Status: COMPLETED | OUTPATIENT
Start: 2022-04-01 | End: 2022-04-01

## 2022-04-01 RX ORDER — PANTOPRAZOLE SODIUM 20 MG/1
40 TABLET, DELAYED RELEASE ORAL DAILY
Refills: 0 | Status: DISCONTINUED | OUTPATIENT
Start: 2022-04-01 | End: 2022-04-06

## 2022-04-01 RX ORDER — SENNA PLUS 8.6 MG/1
2 TABLET ORAL AT BEDTIME
Refills: 0 | Status: DISCONTINUED | OUTPATIENT
Start: 2022-04-01 | End: 2022-04-06

## 2022-04-01 RX ORDER — CHLORHEXIDINE GLUCONATE 213 G/1000ML
1 SOLUTION TOPICAL
Refills: 0 | Status: DISCONTINUED | OUTPATIENT
Start: 2022-04-01 | End: 2022-04-06

## 2022-04-01 RX ORDER — MUPIROCIN 20 MG/G
1 OINTMENT TOPICAL
Refills: 0 | Status: COMPLETED | OUTPATIENT
Start: 2022-04-01 | End: 2022-04-06

## 2022-04-01 RX ORDER — LEVETIRACETAM 250 MG/1
500 TABLET, FILM COATED ORAL EVERY 12 HOURS
Refills: 0 | Status: DISCONTINUED | OUTPATIENT
Start: 2022-04-01 | End: 2022-04-06

## 2022-04-01 RX ORDER — POLYETHYLENE GLYCOL 3350 17 G/17G
17 POWDER, FOR SOLUTION ORAL
Refills: 0 | Status: DISCONTINUED | OUTPATIENT
Start: 2022-04-01 | End: 2022-04-06

## 2022-04-01 RX ORDER — VANCOMYCIN HCL 1 G
125 VIAL (EA) INTRAVENOUS EVERY 6 HOURS
Refills: 0 | Status: DISCONTINUED | OUTPATIENT
Start: 2022-04-01 | End: 2022-04-02

## 2022-04-01 RX ADMIN — LEVETIRACETAM 420 MILLIGRAM(S): 250 TABLET, FILM COATED ORAL at 19:39

## 2022-04-01 RX ADMIN — CEFEPIME 100 MILLIGRAM(S): 1 INJECTION, POWDER, FOR SOLUTION INTRAMUSCULAR; INTRAVENOUS at 17:27

## 2022-04-01 RX ADMIN — LEVETIRACETAM 500 MILLIGRAM(S): 250 TABLET, FILM COATED ORAL at 06:43

## 2022-04-01 RX ADMIN — SENNA PLUS 2 TABLET(S): 8.6 TABLET ORAL at 23:05

## 2022-04-01 RX ADMIN — PANTOPRAZOLE SODIUM 10 MG/HR: 20 TABLET, DELAYED RELEASE ORAL at 02:03

## 2022-04-01 RX ADMIN — Medication 10 MILLIGRAM(S): at 12:29

## 2022-04-01 RX ADMIN — MUPIROCIN 1 APPLICATION(S): 20 OINTMENT TOPICAL at 17:23

## 2022-04-01 RX ADMIN — SODIUM ZIRCONIUM CYCLOSILICATE 10 GRAM(S): 10 POWDER, FOR SUSPENSION ORAL at 03:17

## 2022-04-01 RX ADMIN — SODIUM CHLORIDE 75 MILLILITER(S): 9 INJECTION, SOLUTION INTRAVENOUS at 17:31

## 2022-04-01 RX ADMIN — Medication 125 MILLIGRAM(S): at 23:05

## 2022-04-01 RX ADMIN — CEFEPIME 100 MILLIGRAM(S): 1 INJECTION, POWDER, FOR SOLUTION INTRAMUSCULAR; INTRAVENOUS at 06:43

## 2022-04-01 NOTE — CONSULT NOTE ADULT - ASSESSMENT
1. Iron deficiency anemia  2. Melena  3. No evidence of acute GI bleeding  4. Sepsis  5. Colitis  6. R/o C. Diff colitis    Suggestions:    1. Monitor H/H  2.Transfuse PRBC as needed  3. Protonix daily  4. Check stool for C. Diff toxin  5. ID evaluation  6. Antibiotics IV  7. Avoid NSAID  8. DVT prophylaxis

## 2022-04-01 NOTE — CONSULT NOTE ADULT - RS GEN PE MLT RESP DETAILS PC
airway patent/good air movement/respirations non-labored/no rhonchi
breath sounds equal/good air movement/no rhonchi/no wheezes/rales

## 2022-04-01 NOTE — ADVANCED PRACTICE NURSE CONSULT - RECOMMEDATIONS
-Clean all affected areas with normal saline and apply skin prep to the surrounding skin  -Apply a Foam dressing to the Coccyx area Q 72hrs PRN  -Elevate/float the patients heels using heel protectors and reposition the patient Q 2hrs using wedges or pillows

## 2022-04-01 NOTE — CONSULT NOTE ADULT - SUBJECTIVE AND OBJECTIVE BOX
HPI:  Patient is a 87 y/o male, baseline AA0x1 and minimally ambulatory, with significant medical history of HTN, T2DM, PVOD, COPD, AAA (5.2cm), Tuberculosis, Seizure disorder, Anxiety, Vascular Dementia, Prior SAH/SDH, Iron Deficiency Anemia, Hypothyroidism, and Urinary Incontinence presented from [facility] with complaints of melena. The patient was very altered while in the ED and unable to provide any history or ROS; collateral history was obtained from facility records and Sister (Mrs. Candelaria Martin), and niece (Mrs. Viviana Moon). They reported that they had just found out about the melena today, and that he has no prior history of GIB in the past. As per med rec from facility, patient is not on ASA, or any blood thinners. Patient  was noted to be hypotensive to 60/38, , temp 98.7, and saturating 96% on RA. Initial labs showed a lactate of 6.1, WBC of 57, and Hb of 12.7; Pt was transfused 2U PRBC in and given 3L isotonic fluids upon which his BP improved. ICU was consulted initially for septic shock +/- acute GIB but as patient was fluid responsive and regained some mental status, he was found to be stable enough to go to general floor for further workup.  (31 Mar 2022 19:25)      PAST MEDICAL & SURGICAL HISTORY:  Dementia    Seizure    Anemia    Depression    HTN (hypertension)    Osteoarthritis    Osteoporosis    Diabetes mellitus    Seizure    T2DM (type 2 diabetes mellitus)    Seizures    Vitamin D deficiency    HTN (hypertension)    History of atherosclerosis    PVD (peripheral vascular disease)    Vascular dementia    Chronic obstructive pulmonary disease, unspecified COPD type    DM (diabetes mellitus)    No significant past surgical history        No Known Allergies      Meds:  acetaminophen     Tablet .. 650 milliGRAM(s) Oral every 6 hours PRN  ALBUTerol    90 MICROgram(s) HFA Inhaler 2 Puff(s) Inhalation every 6 hours PRN  aluminum hydroxide/magnesium hydroxide/simethicone Suspension 30 milliLiter(s) Oral every 4 hours PRN  cefepime   IVPB      cefepime   IVPB 1000 milliGRAM(s) IV Intermittent every 12 hours  chlorhexidine 2% Cloths 1 Application(s) Topical <User Schedule>  dextrose 50% Injectable 25 Gram(s) IV Push once  glucagon  Injectable 1 milliGRAM(s) IntraMuscular once  insulin lispro (ADMELOG) corrective regimen sliding scale   SubCutaneous every 6 hours  lactated ringers. 1000 milliLiter(s) IV Continuous <Continuous>  levETIRAcetam  IVPB 500 milliGRAM(s) IV Intermittent every 12 hours  melatonin 3 milliGRAM(s) Oral at bedtime PRN  mupirocin 2% Ointment 1 Application(s) Both Nostrils two times a day  ondansetron Injectable 4 milliGRAM(s) IV Push every 8 hours PRN  pantoprazole  Injectable 40 milliGRAM(s) IV Push daily  polyethylene glycol 3350 17 Gram(s) Oral two times a day  senna 2 Tablet(s) Oral at bedtime  vancomycin    Solution 125 milliGRAM(s) Oral every 6 hours      SOCIAL HISTORY:  Smoker:  YES / NO        PACK YEARS:                         WHEN QUIT?  ETOH use:  YES / NO               FREQUENCY / QUANTITY:  Ilicit Drug use:  YES / NO  Occupation:  Assisted device use (Cane / Walker):  Live with:    FAMILY HISTORY:  Patient&#x27;s mother is         VITALS:  Vital Signs Last 24 Hrs  T(C): 36.8 (2022 13:00), Max: 36.8 (2022 13:00)  T(F): 98.2 (2022 13:00), Max: 98.2 (2022 13:00)  HR: 85 (2022 13:00) (85 - 96)  BP: 118/60 (2022 13:00) (106/56 - 118/60)  BP(mean): --  RR: 20 (2022 13:00) (17 - 20)  SpO2: 100% (2022 13:00) (98% - 100%)    LABS/DIAGNOSTIC TESTS:                          13.1   34.83 )-----------( 201      ( 2022 05:43 )             39.4     WBC Count: 34.83 K/uL ( @ 05:43)  WBC Count: 39.73 K/uL ( @ 02:30)  WBC Count: 57.00 K/uL ( @ 15:34)          139  |  107  |  34<H>  ----------------------------<  106<H>  4.7   |  26  |  1.12    Ca    9.3      2022 10:07    TPro  6.4  /  Alb  2.6<L>  /  TBili  0.7  /  DBili  x   /  AST  26  /  ALT  20  /  AlkPhos  59  -      Urinalysis Basic - ( 31 Mar 2022 21:03 )    Color: Monik / Appearance: Slightly Turbid / S.020 / pH: x  Gluc: x / Ketone: Trace  / Bili: Moderate / Urobili: 8   Blood: x / Protein: 100 / Nitrite: Negative   Leuk Esterase: Trace / RBC: 10-25 /HPF / WBC 3-5 /HPF   Sq Epi: x / Non Sq Epi: Few /HPF / Bacteria: Many /HPF        LIVER FUNCTIONS - ( 2022 05:43 )  Alb: 2.6 g/dL / Pro: 6.4 g/dL / ALK PHOS: 59 U/L / ALT: 20 U/L DA / AST: 26 U/L / GGT: x             PT/INR - ( 2022 05:43 )   PT: 14.3 sec;   INR: 1.20 ratio         PTT - ( 2022 05:43 )  PTT:23.9 sec    LACTATE:Lactate, Blood: 1.6 mmol/L ( @ 10:07)  Lactate, Blood: 2.5 mmol/L ( @ 23:37)  Lactate, Blood: 4.3 mmol/L ( @ 20:09)      ABG -     CULTURES:       RADIOLOGY:< from: CT Abdomen and Pelvis No Cont (22 @ 21:03) >  ACC: [id number] EXAM:  CT ABDOMEN AND PELVIS                        ACC: [id number] EXAM:  CT CHEST                          PROCEDURE DATE:  2022          INTERPRETATION:  CLINICAL INFORMATION: Sepsis of unknown source.    COMPARISON: CT chest abdomen 2020    CONTRAST/COMPLICATIONS:  IV Contrast: NONE  Oral Contrast: NONE  Complications: None reported at time of study completion    PROCEDURE:  CT of the Chest, Abdomen and Pelvis was performed.  Sagittal and coronal reformats were performed.    FINDINGS:    CHEST:  LUNGS AND LARGE AIRWAYS: Trace central airway secretions. Prominent   biapical pleuroparenchymal scarring. Few groundglass opacities and   clustered tree-in-bud nodularity of the right upper lobe and to a lesser   extent,the right middle lobe. Findings may be infectious or   inflammatory. 4 mm right middle lobe nodule, image 111 series 2, stable   since 2020. Few additional calcified granulomas noted.  PLEURA: No pleural effusion or pneumothorax.  VESSELS: Atheromatous change of the thoracic aorta which is borderline   aneurysmal at the proximal segment, 3.1 cm and mildly aneurysmal at the   distal segment, 3.3 cm. Main pulmonary artery size is within normal limits  HEART: Heart size is qualitatively withinnormal limits. There is   coronary artery calcification and/or stenting. Correlate with procedural   history. Trace pericardial fluid.  MEDIASTINUM AND FUAD: Small volume nodes.  CHEST WALL AND LOWER NECK: No chest wall hematoma. Chronic left scapular   deformity.    ABDOMEN AND PELVIS:    Solid organ evaluation is limited due to noncontrast technique.    LIVER: Liver size within normal limits  BILE DUCTS: No biliary distention  GALLBLADDER: Nonspecific gallbladder distention. Correlate with LFTs.  SPLEEN: Normal size  PANCREAS: Atrophy of the pancreatic parenchyma. No main ductal dilatation  ADRENALS: Adrenal gland thickening, nonspecific  KIDNEYS/URETERS: No hydronephrosis. Bilateral renal hypodensities   suboptimally characterized in the absence of IV contrast.    BLADDER: Underdistended.  REPRODUCTIVE ORGANS: Prostate size within normal limits.    BOWEL: Limited evaluation the absence of oral contrast. Stomach is   underdistended. No small bowel distention. The appendix is noninflamed.   There is diffuse long segment colonic wall inflammation and large fecal   load of the rectum.  PERITONEUM: Trace fluid.  VESSELS: 6.4 x 6.2 cm infrarenal abdominal aortic aneurysm, previously   6.1 x 6.1 cm on 2020. Few apparent displaced intimal calcifications   associated with the aneurysm are new since prior CT from 2020,   possibly related to organizing peripheral mural thrombus. Borderline   aneurysmal dilatation/ectasia of the common iliac arteries as well, 1.7   cm on the right and 1.5 cm and the left, stable.  RETROPERITONEUM/LYMPH NODES: Small volume nodes.  ABDOMINAL WALL: Tiny fat-containing umbilical hernia.  BONES: Degenerative changes and osseous demineralization. L5-S1 pars   interarticularis defects redemonstrated. Compression deformities of T3   and T8-T10 vertebral bodies are stable since 2020. Chronic left   scapular deformity. Chronic left clavicular deformity partially imaged.   Left ulna fixation hardware incidentally noted. Few well marginated   subcentimeter sclerotic foci of the proximal right femur may represent   bone islands.    IMPRESSION:    Noncontrast study.    CHEST:  1. Few groundglass opacities and clustered/tree-in-bud nodularity of the   right upper lobe, and to a lesser extentthe right middle lobe, may be   infectious or inflammatory.  2. Coronary artery calcifications versus stenting. Correlate with   procedural history.    ABDOMEN/PELVIS:  1. Pan-proctocolitis. Large fecal load of rectum.  2. Redemonstrated 6.4 x 6.2 cminfrarenal abdominal aortic aneurysm,   previously 6.1 x 6.1 cm on 2020.    --- End of Report ---            KAHLIL STAHL M.D., ATTENDING RADIOLOGIST  This document has been electronically signed. 2022  8:33AM    < end of copied text >        ROS  [  ] UNABLE TO ELICIT               HPI:  Patient is a 85 y/o male, baseline AA0x1 and minimally ambulatory, with significant medical history of HTN, T2DM, PVOD, COPD, AAA (5.2cm), Tuberculosis, Seizure disorder, Anxiety, Vascular Dementia, Prior SAH/SDH, Iron Deficiency Anemia, Hypothyroidism, and Urinary Incontinence presented from [facility] with complaints of melena. The patient was very altered while in the ED and unable to provide any history or ROS; collateral history was obtained from facility records and Sister (Mrs. Candelaria Martin), and niece (Mrs. Viviana Moon). They reported that they had just found out about the melena today, and that he has no prior history of GIB in the past. As per med rec from facility, patient is not on ASA, or any blood thinners. Patient  was noted to be hypotensive to 60/38, , temp 98.7, and saturating 96% on RA. Initial labs showed a lactate of 6.1, WBC of 57, and Hb of 12.7; Pt was transfused 2U PRBC in and given 3L isotonic fluids upon which his BP improved. ICU was consulted initially for septic shock +/- acute GIB but as patient was fluid responsive and regained some mental status, he was found to be stable enough to go to general floor for further workup.          History as above, pt was admitted from a NH with melena and AMS , he was found to have a very high WBC count and also found to have groundglass opacities on his CT chest and some colitis on the CT also. He is being treated for pneumonia and possible C. Difficile colitis empirically but has no diarrhea. He is totally confused and only responds to his name only, he isn't in any distress and has a slight cough only. He has no fevers.       PAST MEDICAL & SURGICAL HISTORY:  Dementia    Seizure    Anemia    Depression    HTN (hypertension)    Osteoarthritis    Osteoporosis    Diabetes mellitus    Seizure    T2DM (type 2 diabetes mellitus)    Seizures    Vitamin D deficiency    HTN (hypertension)    History of atherosclerosis    PVD (peripheral vascular disease)    Vascular dementia    Chronic obstructive pulmonary disease, unspecified COPD type    DM (diabetes mellitus)    No significant past surgical history        No Known Allergies      Meds:  acetaminophen     Tablet .. 650 milliGRAM(s) Oral every 6 hours PRN  ALBUTerol    90 MICROgram(s) HFA Inhaler 2 Puff(s) Inhalation every 6 hours PRN  aluminum hydroxide/magnesium hydroxide/simethicone Suspension 30 milliLiter(s) Oral every 4 hours PRN  cefepime   IVPB      cefepime   IVPB 1000 milliGRAM(s) IV Intermittent every 12 hours  chlorhexidine 2% Cloths 1 Application(s) Topical <User Schedule>  dextrose 50% Injectable 25 Gram(s) IV Push once  glucagon  Injectable 1 milliGRAM(s) IntraMuscular once  insulin lispro (ADMELOG) corrective regimen sliding scale   SubCutaneous every 6 hours  lactated ringers. 1000 milliLiter(s) IV Continuous <Continuous>  levETIRAcetam  IVPB 500 milliGRAM(s) IV Intermittent every 12 hours  melatonin 3 milliGRAM(s) Oral at bedtime PRN  mupirocin 2% Ointment 1 Application(s) Both Nostrils two times a day  ondansetron Injectable 4 milliGRAM(s) IV Push every 8 hours PRN  pantoprazole  Injectable 40 milliGRAM(s) IV Push daily  polyethylene glycol 3350 17 Gram(s) Oral two times a day  senna 2 Tablet(s) Oral at bedtime  vancomycin    Solution 125 milliGRAM(s) Oral every 6 hours      SOCIAL HISTORY:  Smoker:  YES / NO        PACK YEARS:                         WHEN QUIT?  ETOH use:  YES / NO               FREQUENCY / QUANTITY:  Ilicit Drug use:  YES / NO  Occupation:  Assisted device use (Cane / Walker):  Live with:    FAMILY HISTORY:  Patient&#x27;s mother is         VITALS:  Vital Signs Last 24 Hrs  T(C): 36.8 (2022 13:00), Max: 36.8 (2022 13:00)  T(F): 98.2 (2022 13:00), Max: 98.2 (2022 13:00)  HR: 85 (2022 13:00) (85 - 96)  BP: 118/60 (2022 13:00) (106/56 - 118/60)  BP(mean): --  RR: 20 (2022 13:00) (17 - 20)  SpO2: 100% (2022 13:00) (98% - 100%)    LABS/DIAGNOSTIC TESTS:                          13.1   34.83 )-----------( 201      ( 2022 05:43 )             39.4     WBC Count: 34.83 K/uL ( @ 05:43)  WBC Count: 39.73 K/uL ( @ 02:30)  WBC Count: 57.00 K/uL ( @ 15:34)          139  |  107  |  34<H>  ----------------------------<  106<H>  4.7   |  26  |  1.12    Ca    9.3      2022 10:07    TPro  6.4  /  Alb  2.6<L>  /  TBili  0.7  /  DBili  x   /  AST  26  /  ALT  20  /  AlkPhos  59        Urinalysis Basic - ( 31 Mar 2022 21:03 )    Color: Monik / Appearance: Slightly Turbid / S.020 / pH: x  Gluc: x / Ketone: Trace  / Bili: Moderate / Urobili: 8   Blood: x / Protein: 100 / Nitrite: Negative   Leuk Esterase: Trace / RBC: 10-25 /HPF / WBC 3-5 /HPF   Sq Epi: x / Non Sq Epi: Few /HPF / Bacteria: Many /HPF        LIVER FUNCTIONS - ( 2022 05:43 )  Alb: 2.6 g/dL / Pro: 6.4 g/dL / ALK PHOS: 59 U/L / ALT: 20 U/L DA / AST: 26 U/L / GGT: x             PT/INR - ( 2022 05:43 )   PT: 14.3 sec;   INR: 1.20 ratio         PTT - ( 2022 05:43 )  PTT:23.9 sec    LACTATE:Lactate, Blood: 1.6 mmol/L ( @ 10:07)  Lactate, Blood: 2.5 mmol/L ( @ 23:37)  Lactate, Blood: 4.3 mmol/L ( @ 20:09)      ABG -     CULTURES:       RADIOLOGY:< from: CT Abdomen and Pelvis No Cont (22 @ 21:03) >  ACC: [id number] EXAM:  CT ABDOMEN AND PELVIS                        ACC: [id number] EXAM:  CT CHEST                          PROCEDURE DATE:  2022          INTERPRETATION:  CLINICAL INFORMATION: Sepsis of unknown source.    COMPARISON: CT chest abdomen 2020    CONTRAST/COMPLICATIONS:  IV Contrast: NONE  Oral Contrast: NONE  Complications: None reported at time of study completion    PROCEDURE:  CT of the Chest, Abdomen and Pelvis was performed.  Sagittal and coronal reformats were performed.    FINDINGS:    CHEST:  LUNGS AND LARGE AIRWAYS: Trace central airway secretions. Prominent   biapical pleuroparenchymal scarring. Few groundglass opacities and   clustered tree-in-bud nodularity of the right upper lobe and to a lesser   extent,the right middle lobe. Findings may be infectious or   inflammatory. 4 mm right middle lobe nodule, image 111 series 2, stable   since 2020. Few additional calcified granulomas noted.  PLEURA: No pleural effusion or pneumothorax.  VESSELS: Atheromatous change of the thoracic aorta which is borderline   aneurysmal at the proximal segment, 3.1 cm and mildly aneurysmal at the   distal segment, 3.3 cm. Main pulmonary artery size is within normal limits  HEART: Heart size is qualitatively withinnormal limits. There is   coronary artery calcification and/or stenting. Correlate with procedural   history. Trace pericardial fluid.  MEDIASTINUM AND FUAD: Small volume nodes.  CHEST WALL AND LOWER NECK: No chest wall hematoma. Chronic left scapular   deformity.    ABDOMEN AND PELVIS:    Solid organ evaluation is limited due to noncontrast technique.    LIVER: Liver size within normal limits  BILE DUCTS: No biliary distention  GALLBLADDER: Nonspecific gallbladder distention. Correlate with LFTs.  SPLEEN: Normal size  PANCREAS: Atrophy of the pancreatic parenchyma. No main ductal dilatation  ADRENALS: Adrenal gland thickening, nonspecific  KIDNEYS/URETERS: No hydronephrosis. Bilateral renal hypodensities   suboptimally characterized in the absence of IV contrast.    BLADDER: Underdistended.  REPRODUCTIVE ORGANS: Prostate size within normal limits.    BOWEL: Limited evaluation the absence of oral contrast. Stomach is   underdistended. No small bowel distention. The appendix is noninflamed.   There is diffuse long segment colonic wall inflammation and large fecal   load of the rectum.  PERITONEUM: Trace fluid.  VESSELS: 6.4 x 6.2 cm infrarenal abdominal aortic aneurysm, previously   6.1 x 6.1 cm on 2020. Few apparent displaced intimal calcifications   associated with the aneurysm are new since prior CT from 2020,   possibly related to organizing peripheral mural thrombus. Borderline   aneurysmal dilatation/ectasia of the common iliac arteries as well, 1.7   cm on the right and 1.5 cm and the left, stable.  RETROPERITONEUM/LYMPH NODES: Small volume nodes.  ABDOMINAL WALL: Tiny fat-containing umbilical hernia.  BONES: Degenerative changes and osseous demineralization. L5-S1 pars   interarticularis defects redemonstrated. Compression deformities of T3   and T8-T10 vertebral bodies are stable since 2020. Chronic left   scapular deformity. Chronic left clavicular deformity partially imaged.   Left ulna fixation hardware incidentally noted. Few well marginated   subcentimeter sclerotic foci of the proximal right femur may represent   bone islands.    IMPRESSION:    Noncontrast study.    CHEST:  1. Few groundglass opacities and clustered/tree-in-bud nodularity of the   right upper lobe, and to a lesser extentthe right middle lobe, may be   infectious or inflammatory.  2. Coronary artery calcifications versus stenting. Correlate with   procedural history.    ABDOMEN/PELVIS:  1. Pan-proctocolitis. Large fecal load of rectum.  2. Redemonstrated 6.4 x 6.2 cminfrarenal abdominal aortic aneurysm,   previously 6.1 x 6.1 cm on 2020.    --- End of Report ---            KAHLIL STAHL M.D., ATTENDING RADIOLOGIST  This document has been electronically signed. 2022  8:33AM    < end of copied text >        ROS  [  ] UNABLE TO ELICIT               HPI:  Patient is a 85 y/o male, baseline AA0x1 and minimally ambulatory, with significant medical history of HTN, T2DM, PVOD, COPD, AAA (5.2cm), Tuberculosis, Seizure disorder, Anxiety, Vascular Dementia, Prior SAH/SDH, Iron Deficiency Anemia, Hypothyroidism, and Urinary Incontinence presented from [facility] with complaints of melena. The patient was very altered while in the ED and unable to provide any history or ROS; collateral history was obtained from facility records and Sister (Mrs. Candelaria Martin), and niece (Mrs. Viviana Moon). They reported that they had just found out about the melena today, and that he has no prior history of GIB in the past. As per med rec from facility, patient is not on ASA, or any blood thinners. Patient  was noted to be hypotensive to 60/38, , temp 98.7, and saturating 96% on RA. Initial labs showed a lactate of 6.1, WBC of 57, and Hb of 12.7; Pt was transfused 2U PRBC in and given 3L isotonic fluids upon which his BP improved. ICU was consulted initially for septic shock +/- acute GIB but as patient was fluid responsive and regained some mental status, he was found to be stable enough to go to general floor for further workup.          History as above, pt was admitted from a NH with melena and AMS , he was found to have a very high WBC count and also found to have groundglass opacities on his CT chest and some colitis on the CT also. He is being treated for pneumonia and possible C. Difficile colitis empirically but has no diarrhea. He is totally confused and only responds to his name only, he isn't in any distress and has a slight cough only. He has no fevers.       PAST MEDICAL & SURGICAL HISTORY:  Dementia    Seizure    Anemia    Depression    HTN (hypertension)    Osteoarthritis    Osteoporosis    Diabetes mellitus    Seizure    T2DM (type 2 diabetes mellitus)    Seizures    Vitamin D deficiency    HTN (hypertension)    History of atherosclerosis    PVD (peripheral vascular disease)    Vascular dementia    Chronic obstructive pulmonary disease, unspecified COPD type    DM (diabetes mellitus)    No significant past surgical history        No Known Allergies      Meds:  acetaminophen     Tablet .. 650 milliGRAM(s) Oral every 6 hours PRN  ALBUTerol    90 MICROgram(s) HFA Inhaler 2 Puff(s) Inhalation every 6 hours PRN  aluminum hydroxide/magnesium hydroxide/simethicone Suspension 30 milliLiter(s) Oral every 4 hours PRN  cefepime   IVPB      cefepime   IVPB 1000 milliGRAM(s) IV Intermittent every 12 hours  chlorhexidine 2% Cloths 1 Application(s) Topical <User Schedule>  dextrose 50% Injectable 25 Gram(s) IV Push once  glucagon  Injectable 1 milliGRAM(s) IntraMuscular once  insulin lispro (ADMELOG) corrective regimen sliding scale   SubCutaneous every 6 hours  lactated ringers. 1000 milliLiter(s) IV Continuous <Continuous>  levETIRAcetam  IVPB 500 milliGRAM(s) IV Intermittent every 12 hours  melatonin 3 milliGRAM(s) Oral at bedtime PRN  mupirocin 2% Ointment 1 Application(s) Both Nostrils two times a day  ondansetron Injectable 4 milliGRAM(s) IV Push every 8 hours PRN  pantoprazole  Injectable 40 milliGRAM(s) IV Push daily  polyethylene glycol 3350 17 Gram(s) Oral two times a day  senna 2 Tablet(s) Oral at bedtime  vancomycin    Solution 125 milliGRAM(s) Oral every 6 hours      SOCIAL HISTORY: unknown    FAMILY HISTORY:  Patient&#x27;s mother is         VITALS:  Vital Signs Last 24 Hrs  T(C): 36.8 (2022 13:00), Max: 36.8 (2022 13:00)  T(F): 98.2 (2022 13:00), Max: 98.2 (2022 13:00)  HR: 85 (2022 13:00) (85 - 96)  BP: 118/60 (2022 13:00) (106/56 - 118/60)  BP(mean): --  RR: 20 (2022 13:00) (17 - 20)  SpO2: 100% (2022 13:00) (98% - 100%)    LABS/DIAGNOSTIC TESTS:                          13.1   34.83 )-----------( 201      ( 2022 05:43 )             39.4     WBC Count: 34.83 K/uL ( @ 05:43)  WBC Count: 39.73 K/uL ( @ 02:30)  WBC Count: 57.00 K/uL ( @ 15:34)          139  |  107  |  34<H>  ----------------------------<  106<H>  4.7   |  26  |  1.12    Ca    9.3      2022 10:07    TPro  6.4  /  Alb  2.6<L>  /  TBili  0.7  /  DBili  x   /  AST  26  /  ALT  20  /  AlkPhos  59        Urinalysis Basic - ( 31 Mar 2022 21:03 )    Color: Monik / Appearance: Slightly Turbid / S.020 / pH: x  Gluc: x / Ketone: Trace  / Bili: Moderate / Urobili: 8   Blood: x / Protein: 100 / Nitrite: Negative   Leuk Esterase: Trace / RBC: 10-25 /HPF / WBC 3-5 /HPF   Sq Epi: x / Non Sq Epi: Few /HPF / Bacteria: Many /HPF        LIVER FUNCTIONS - ( 2022 05:43 )  Alb: 2.6 g/dL / Pro: 6.4 g/dL / ALK PHOS: 59 U/L / ALT: 20 U/L DA / AST: 26 U/L / GGT: x             PT/INR - ( 2022 05:43 )   PT: 14.3 sec;   INR: 1.20 ratio         PTT - ( 2022 05:43 )  PTT:23.9 sec    LACTATE:Lactate, Blood: 1.6 mmol/L ( @ 10:07)  Lactate, Blood: 2.5 mmol/L ( @ 23:37)  Lactate, Blood: 4.3 mmol/L ( @ 20:09)      ABG -     CULTURES:       RADIOLOGY:< from: CT Abdomen and Pelvis No Cont (22 @ 21:03) >  ACC: [id number] EXAM:  CT ABDOMEN AND PELVIS                        ACC: [id number] EXAM:  CT CHEST                          PROCEDURE DATE:  2022          INTERPRETATION:  CLINICAL INFORMATION: Sepsis of unknown source.    COMPARISON: CT chest abdomen 2020    CONTRAST/COMPLICATIONS:  IV Contrast: NONE  Oral Contrast: NONE  Complications: None reported at time of study completion    PROCEDURE:  CT of the Chest, Abdomen and Pelvis was performed.  Sagittal and coronal reformats were performed.    FINDINGS:    CHEST:  LUNGS AND LARGE AIRWAYS: Trace central airway secretions. Prominent   biapical pleuroparenchymal scarring. Few groundglass opacities and   clustered tree-in-bud nodularity of the right upper lobe and to a lesser   extent,the right middle lobe. Findings may be infectious or   inflammatory. 4 mm right middle lobe nodule, image 111 series 2, stable   since 2020. Few additional calcified granulomas noted.  PLEURA: No pleural effusion or pneumothorax.  VESSELS: Atheromatous change of the thoracic aorta which is borderline   aneurysmal at the proximal segment, 3.1 cm and mildly aneurysmal at the   distal segment, 3.3 cm. Main pulmonary artery size is within normal limits  HEART: Heart size is qualitatively withinnormal limits. There is   coronary artery calcification and/or stenting. Correlate with procedural   history. Trace pericardial fluid.  MEDIASTINUM AND FUAD: Small volume nodes.  CHEST WALL AND LOWER NECK: No chest wall hematoma. Chronic left scapular   deformity.    ABDOMEN AND PELVIS:    Solid organ evaluation is limited due to noncontrast technique.    LIVER: Liver size within normal limits  BILE DUCTS: No biliary distention  GALLBLADDER: Nonspecific gallbladder distention. Correlate with LFTs.  SPLEEN: Normal size  PANCREAS: Atrophy of the pancreatic parenchyma. No main ductal dilatation  ADRENALS: Adrenal gland thickening, nonspecific  KIDNEYS/URETERS: No hydronephrosis. Bilateral renal hypodensities   suboptimally characterized in the absence of IV contrast.    BLADDER: Underdistended.  REPRODUCTIVE ORGANS: Prostate size within normal limits.    BOWEL: Limited evaluation the absence of oral contrast. Stomach is   underdistended. No small bowel distention. The appendix is noninflamed.   There is diffuse long segment colonic wall inflammation and large fecal   load of the rectum.  PERITONEUM: Trace fluid.  VESSELS: 6.4 x 6.2 cm infrarenal abdominal aortic aneurysm, previously   6.1 x 6.1 cm on 2020. Few apparent displaced intimal calcifications   associated with the aneurysm are new since prior CT from 2020,   possibly related to organizing peripheral mural thrombus. Borderline   aneurysmal dilatation/ectasia of the common iliac arteries as well, 1.7   cm on the right and 1.5 cm and the left, stable.  RETROPERITONEUM/LYMPH NODES: Small volume nodes.  ABDOMINAL WALL: Tiny fat-containing umbilical hernia.  BONES: Degenerative changes and osseous demineralization. L5-S1 pars   interarticularis defects redemonstrated. Compression deformities of T3   and T8-T10 vertebral bodies are stable since 2020. Chronic left   scapular deformity. Chronic left clavicular deformity partially imaged.   Left ulna fixation hardware incidentally noted. Few well marginated   subcentimeter sclerotic foci of the proximal right femur may represent   bone islands.    IMPRESSION:    Noncontrast study.    CHEST:  1. Few groundglass opacities and clustered/tree-in-bud nodularity of the   right upper lobe, and to a lesser extentthe right middle lobe, may be   infectious or inflammatory.  2. Coronary artery calcifications versus stenting. Correlate with   procedural history.    ABDOMEN/PELVIS:  1. Pan-proctocolitis. Large fecal load of rectum.  2. Redemonstrated 6.4 x 6.2 cminfrarenal abdominal aortic aneurysm,   previously 6.1 x 6.1 cm on 2020.    --- End of Report ---            KAHLIL STAHL M.D., ATTENDING RADIOLOGIST  This document has been electronically signed. 2022  8:33AM    < end of copied text >        ROS  [ x ] UNABLE TO ELICIT

## 2022-04-01 NOTE — CONSULT NOTE ADULT - SUBJECTIVE AND OBJECTIVE BOX
HPI:  Patient is a 85 y/o male, baseline AA0x1 and minimally ambulatory, with significant medical history of HTN, T2DM, PVOD, COPD, AAA (5.2cm), Tuberculosis, Seizure disorder, Anxiety, Vascular Dementia, Prior SAH/SDH, Iron Deficiency Anemia, Hypothyroidism, and Urinary Incontinence presented from [facility] with complaints of melena. The patient was very altered while in the ED and unable to provide any history or ROS; collateral history was obtained from facility records and Sister (Mrs. Candelaria Matrin), and niece (Mrs. Viviana Moon). They reported that they had just found out about the melena today, and that he has no prior history of GIB in the past. As per med rec from facility, patient is not on ASA, or any blood thinners. Patient  was noted to be hypotensive to 60/38, , temp 98.7, and saturating 96% on RA. Initial labs showed a lactate of 6.1, WBC of 57, and Hb of 12.7; Pt was transfused 2U PRBC in and given 3L isotonic fluids upon which his BP improved. ICU was consulted initially for septic shock +/- acute GIB but as patient was fluid responsive and regained some mental status, he was found to be stable enough to go to general floor for further workup.  (31 Mar 2022 19:25)    VASCULAR SURGERY CONSULTATION:  Patient seen and examined at bedside for vascular consultation due to AAA found on CT. Patient unable to provide adequate history. However has no acute complaints. Denies chest pain, back pain, abdominal pain, or dizziness. Pt admitted with sepsis 2/2 to colitis vs PNA. Has hx of AAA that has increased in size since .    PAST MEDICAL & SURGICAL HISTORY:  Dementia    Seizure    Anemia    Depression    HTN (hypertension)    Osteoarthritis    Osteoporosis    Diabetes mellitus    Seizure    T2DM (type 2 diabetes mellitus)    Seizures    Vitamin D deficiency    HTN (hypertension)    History of atherosclerosis    PVD (peripheral vascular disease)    Vascular dementia    Chronic obstructive pulmonary disease, unspecified COPD type    DM (diabetes mellitus)    No significant past surgical history      MEDICATIONS  (STANDING):  cefepime   IVPB      cefepime   IVPB 1000 milliGRAM(s) IV Intermittent every 12 hours  chlorhexidine 2% Cloths 1 Application(s) Topical <User Schedule>  dextrose 50% Injectable 25 Gram(s) IV Push once  glucagon  Injectable 1 milliGRAM(s) IntraMuscular once  insulin lispro (ADMELOG) corrective regimen sliding scale   SubCutaneous every 6 hours  lactated ringers. 1000 milliLiter(s) (75 mL/Hr) IV Continuous <Continuous>  levETIRAcetam  IVPB 500 milliGRAM(s) IV Intermittent every 12 hours  mupirocin 2% Ointment 1 Application(s) Both Nostrils two times a day  pantoprazole  Injectable 40 milliGRAM(s) IV Push daily  polyethylene glycol 3350 17 Gram(s) Oral two times a day  senna 2 Tablet(s) Oral at bedtime  vancomycin    Solution 125 milliGRAM(s) Oral every 6 hours    MEDICATIONS  (PRN):  acetaminophen     Tablet .. 650 milliGRAM(s) Oral every 6 hours PRN Temp greater or equal to 38C (100.4F), Mild Pain (1 - 3)  ALBUTerol    90 MICROgram(s) HFA Inhaler 2 Puff(s) Inhalation every 6 hours PRN Shortness of Breath and/or Wheezing  aluminum hydroxide/magnesium hydroxide/simethicone Suspension 30 milliLiter(s) Oral every 4 hours PRN Dyspepsia  melatonin 3 milliGRAM(s) Oral at bedtime PRN Insomnia  ondansetron Injectable 4 milliGRAM(s) IV Push every 8 hours PRN Nausea and/or Vomiting      Allergies  No Known Allergies    Vital Signs Last 24 Hrs  T(C): 36.8 (2022 13:00), Max: 36.8 (2022 13:00)  T(F): 98.2 (2022 13:00), Max: 98.2 (2022 13:00)  HR: 85 (2022 13:00) (85 - 96)  BP: 118/60 (2022 13:00) (106/56 - 118/60)  RR: 20 (2022 13:00) (17 - 20)  SpO2: 100% (2022 13:00) (98% - 100%)    Physical:  Gen: awake, alert, NAD  Chest: respiration unlabored   Abd: Soft, nondistended, nontender. No guarding. No pulsatile mass. No flank hematoma or tenderness.  Ext: radial pulses palp bilaterally. DP/PT pulses palp bilaterally. Skin warm to touch bilaterally    LABS:                        13.1   34.83 )-----------( 201      ( 2022 05:43 )             39.4              04-    139  |  107  |  34<H>  ----------------------------<  106<H>  4.7   |  26  |  1.12    Ca    9.3      2022 10:07    TPro  6.4  /  Alb  2.6<L>  /  TBili  0.7  /  DBili  x   /  AST  26  /  ALT  20  /  AlkPhos  59  -            PT/INR - ( 2022 05:43 )   PT: 14.3 sec;   INR: 1.20 ratio         PTT - ( 2022 05:43 )  PTT:23.9 sec  Urinalysis Basic - ( 31 Mar 2022 21:03 )    Color: Monik / Appearance: Slightly Turbid / S.020 / pH: x  Gluc: x / Ketone: Trace  / Bili: Moderate / Urobili: 8   Blood: x / Protein: 100 / Nitrite: Negative   Leuk Esterase: Trace / RBC: 10-25 /HPF / WBC 3-5 /HPF   Sq Epi: x / Non Sq Epi: Few /HPF / Bacteria: Many /HPF      RADIOLOGY & ADDITIONAL STUDIES:  < from: CT Abdomen and Pelvis No Cont (22 @ 21:03) >    ACC: [id number] EXAM:  CT ABDOMEN AND PELVIS                        ACC: [id number] EXAM:  CT CHEST                          PROCEDURE DATE:  2022          INTERPRETATION:  CLINICAL INFORMATION: Sepsis of unknown source.    COMPARISON: CT chest abdomen 2020    CONTRAST/COMPLICATIONS:  IV Contrast: NONE  Oral Contrast: NONE  Complications: None reported at time of study completion    PROCEDURE:  CT of the Chest, Abdomen and Pelvis was performed.  Sagittal and coronal reformats were performed.    FINDINGS:    CHEST:  LUNGS AND LARGE AIRWAYS: Trace central airway secretions. Prominent   biapical pleuroparenchymal scarring. Few groundglass opacities and   clustered tree-in-bud nodularity of the right upper lobe and to a lesser   extent,the right middle lobe. Findings may be infectious or   inflammatory. 4 mm right middle lobe nodule, image 111 series 2, stable   since 2020. Few additional calcified granulomas noted.  PLEURA: No pleural effusion or pneumothorax.  VESSELS: Atheromatous change of the thoracic aorta which is borderline   aneurysmal at the proximal segment, 3.1 cm and mildly aneurysmal at the   distal segment, 3.3 cm. Main pulmonary artery size is within normal limits  HEART: Heart size is qualitatively withinnormal limits. There is   coronary artery calcification and/or stenting. Correlate with procedural   history. Trace pericardial fluid.  MEDIASTINUM AND FUAD: Small volume nodes.  CHEST WALL AND LOWER NECK: No chest wall hematoma. Chronic left scapular   deformity.    ABDOMEN AND PELVIS:    Solid organ evaluation is limited due to noncontrast technique.    LIVER: Liver size within normal limits  BILE DUCTS: No biliary distention  GALLBLADDER: Nonspecific gallbladder distention. Correlate with LFTs.  SPLEEN: Normal size  PANCREAS: Atrophy of the pancreatic parenchyma. No main ductal dilatation  ADRENALS: Adrenal gland thickening, nonspecific  KIDNEYS/URETERS: No hydronephrosis. Bilateral renal hypodensities   suboptimally characterized in the absence of IV contrast.    BLADDER: Underdistended.  REPRODUCTIVE ORGANS: Prostate size within normal limits.    BOWEL: Limited evaluation the absence of oral contrast. Stomach is   underdistended. No small bowel distention. The appendix is noninflamed.   There is diffuse long segment colonic wall inflammation and large fecal   load of the rectum.  PERITONEUM: Trace fluid.  VESSELS: 6.4 x 6.2 cm infrarenal abdominal aortic aneurysm, previously   6.1 x 6.1 cm on 2020. Few apparent displaced intimal calcifications   associated with the aneurysm are new since prior CT from 2020,   possibly related to organizing peripheral mural thrombus. Borderline   aneurysmal dilatation/ectasia of the common iliac arteries as well, 1.7   cm on the right and 1.5 cm and the left, stable.  RETROPERITONEUM/LYMPH NODES: Small volume nodes.  ABDOMINAL WALL: Tiny fat-containing umbilical hernia.  BONES: Degenerative changes and osseous demineralization. L5-S1 pars   interarticularis defects redemonstrated. Compression deformities of T3   and T8-T10 vertebral bodies are stable since 2020. Chronic left   scapular deformity. Chronic left clavicular deformity partially imaged.   Left ulna fixation hardware incidentally noted. Few well marginated   subcentimeter sclerotic foci of the proximal right femur may represent   bone islands.    IMPRESSION:    Noncontrast study.    CHEST:  1. Few groundglass opacities and clustered/tree-in-bud nodularity of the   right upper lobe, and to a lesser extentthe right middle lobe, may be   infectious or inflammatory.  2. Coronary artery calcifications versus stenting. Correlate with   procedural history.    ABDOMEN/PELVIS:  1. Pan-proctocolitis. Large fecal load of rectum.  2. Redemonstrated 6.4 x 6.2 cminfrarenal abdominal aortic aneurysm,   previously 6.1 x 6.1 cm on 2020.    --- End of Report ---      KAHLIL STAHL M.D., ATTENDING RADIOLOGIST  This document has been electronically signed. 2022  8:33AM    < end of copied text >

## 2022-04-01 NOTE — ADVANCED PRACTICE NURSE CONSULT - ASSESSMENT
This is a 86yr old male patient admitted for GI Hemorrhage, presenting with the following:  -There is evidence of hypopigmentation to the Coccyx  -There is a Stage 1 Pressure Injury to the Coccyx and Bilateral Heels, as evident by non-blanchable erythema

## 2022-04-01 NOTE — PROGRESS NOTE ADULT - PROBLEM SELECTOR PLAN 5
likely due to dehydration  Resolved   Continue hydratrion CT abd/pelvis results noted , shows 6.4 x6.2 cm infrarenal abd aortic aneurysm, previously 6.1 cm x 6.1cm on 12/31/2020.  Maintain BP control   F/u Vascular consult

## 2022-04-01 NOTE — CHART NOTE - NSCHARTNOTEFT_GEN_A_CORE
Contacted pt's emergency contact/sister Candelaria Martin and updated on pt's clinical condition and plan of care.   Candelaria will visit tomorrow.   All questions and concerns addressed.     Nora Oviedo, NP Medicine  [phone]

## 2022-04-01 NOTE — CONSULT NOTE ADULT - SUBJECTIVE AND OBJECTIVE BOX
Patient is a 86y old  Male who presents with a chief complaint of Encephalopathy, Hypotension (2022 18:06)      HPI:  Patient is a 87 y/o male, baseline AA0x1 and minimally ambulatory, with significant medical history of HTN, T2DM, PVOD, COPD, AAA (5.2cm), Tuberculosis, Seizure disorder, Anxiety, Vascular Dementia, Prior SAH/SDH, Iron Deficiency Anemia, Hypothyroidism, and Urinary Incontinence presented from [facility] with complaints of melena. The patient was very altered while in the ED and unable to provide any history or ROS; collateral history was obtained from facility records and Sister (Mrs. Candelaria Martin), and niece (Mrs. Viviana Moon). They reported that they had just found out about the melena today, and that he has no prior history of GIB in the past. As per med rec from facility, patient is not on ASA, or any blood thinners. Patient  was noted to be hypotensive to 60/38, , temp 98.7, and saturating 96% on RA. Initial labs showed a lactate of 6.1, WBC of 57, and Hb of 12.7; Pt was transfused 2U PRBC in and given 3L isotonic fluids upon which his BP improved. ICU was consulted initially for septic shock +/- acute GIB but as patient was fluid responsive and regained some mental status, he was found to be stable enough to go to general floor for further workup.  (31 Mar 2022 19:25)       ROS:  Negative except for:    PAST MEDICAL & SURGICAL HISTORY:  Dementia    Seizure    Anemia    Depression    HTN (hypertension)    Osteoarthritis    Osteoporosis    Diabetes mellitus    Seizure    T2DM (type 2 diabetes mellitus)    Seizures    Vitamin D deficiency    HTN (hypertension)    History of atherosclerosis    PVD (peripheral vascular disease)    Vascular dementia    Chronic obstructive pulmonary disease, unspecified COPD type    DM (diabetes mellitus)    No significant past surgical history        SOCIAL HISTORY:    FAMILY HISTORY:  Patient&#x27;s mother is         MEDICATIONS  (STANDING):  cefepime   IVPB      cefepime   IVPB 1000 milliGRAM(s) IV Intermittent every 12 hours  chlorhexidine 2% Cloths 1 Application(s) Topical <User Schedule>  dextrose 50% Injectable 25 Gram(s) IV Push once  glucagon  Injectable 1 milliGRAM(s) IntraMuscular once  insulin lispro (ADMELOG) corrective regimen sliding scale   SubCutaneous every 6 hours  lactated ringers. 1000 milliLiter(s) (75 mL/Hr) IV Continuous <Continuous>  levETIRAcetam  IVPB 500 milliGRAM(s) IV Intermittent every 12 hours  mupirocin 2% Ointment 1 Application(s) Both Nostrils two times a day  pantoprazole  Injectable 40 milliGRAM(s) IV Push daily  polyethylene glycol 3350 17 Gram(s) Oral two times a day  senna 2 Tablet(s) Oral at bedtime  vancomycin    Solution 125 milliGRAM(s) Oral every 6 hours    MEDICATIONS  (PRN):  acetaminophen     Tablet .. 650 milliGRAM(s) Oral every 6 hours PRN Temp greater or equal to 38C (100.4F), Mild Pain (1 - 3)  ALBUTerol    90 MICROgram(s) HFA Inhaler 2 Puff(s) Inhalation every 6 hours PRN Shortness of Breath and/or Wheezing  aluminum hydroxide/magnesium hydroxide/simethicone Suspension 30 milliLiter(s) Oral every 4 hours PRN Dyspepsia  melatonin 3 milliGRAM(s) Oral at bedtime PRN Insomnia  ondansetron Injectable 4 milliGRAM(s) IV Push every 8 hours PRN Nausea and/or Vomiting      Allergies    No Known Allergies    Intolerances        Vital Signs Last 24 Hrs  T(C): 36.8 (2022 13:00), Max: 36.8 (2022 13:00)  T(F): 98.2 (2022 13:00), Max: 98.2 (2022 13:00)  HR: 85 (2022 13:00) (85 - 96)  BP: 118/60 (2022 13:00) (106/56 - 118/60)  BP(mean): --  RR: 20 (2022 13:00) (17 - 20)  SpO2: 100% (2022 13:00) (98% - 100%)    PHYSICAL EXAM  General: adult in NAD  HEENT: clear oropharynx, anicteric sclera, pink conjunctiva  Neck: supple  CV: normal S1/S2 with no murmur rubs or gallops  Lungs: positive air movement b/l ant lungs,clear to auscultation, no wheezes, no rales  Abdomen: soft non-tender non-distended, no hepatosplenomegaly  Ext: no clubbing cyanosis or edema  Skin: no rashes and no petechiae  Neuro: alert and oriented X 4, no focal deficits      LABS:                          13.1   34.83 )-----------( 201      ( 2022 05:43 )             39.4         Mean Cell Volume : 97.5 fl  Mean Cell Hemoglobin : 32.4 pg  Mean Cell Hemoglobin Concentration : 33.2 gm/dL  Auto Neutrophil # : x  Auto Lymphocyte # : x  Auto Monocyte # : x  Auto Eosinophil # : x  Auto Basophil # : x  Auto Neutrophil % : x  Auto Lymphocyte % : x  Auto Monocyte % : x  Auto Eosinophil % : x  Auto Basophil % : x      Serial CBC's   @ 05:43  Hct-39.4 / Hgb-13.1 / Plat-201 / RBC-4.04 / WBC-34.83  Serial CBC's   @ 02:30  Hct-40.5 / Hgb-13.7 / Plat-213 / RBC-4.21 / WBC-39.73  Serial CBC's   @ 15:34  Hct-38.4 / Hgb-12.7 / Plat-284 / RBC-3.66 / WBC-57.00      04-    139  |  107  |  34<H>  ----------------------------<  106<H>  4.7   |  26  |  1.12    Ca    9.3      2022 10:07    TPro  6.4  /  Alb  2.6<L>  /  TBili  0.7  /  DBili  x   /  AST  26  /  ALT  20  /  AlkPhos  59  04-01      PT/INR - ( 2022 05:43 )   PT: 14.3 sec;   INR: 1.20 ratio         PTT - ( 2022 05:43 )  PTT:23.9 sec                BLOOD SMEAR INTERPRETATION:       RADIOLOGY & ADDITIONAL STUDIES:    < from: CT Abdomen and Pelvis No Cont (22 @ 21:03) >  PROCEDURE:  CT of the Chest, Abdomen and Pelvis was performed.  Sagittal and coronal reformats were performed.    FINDINGS:    CHEST:  LUNGS AND LARGE AIRWAYS: Trace central airway secretions. Prominent   biapical pleuroparenchymal scarring. Few groundglass opacities and   clustered tree-in-bud nodularity of the right upper lobe and to a lesser   extent,the right middle lobe. Findings may be infectious or   inflammatory. 4 mm right middle lobe nodule, image 111 series 2, stable   since 2020. Few additional calcified granulomas noted.  PLEURA: No pleural effusion or pneumothorax.  VESSELS: Atheromatous change of the thoracic aorta which is borderline   aneurysmal at the proximal segment, 3.1 cm and mildly aneurysmal at the   distal segment, 3.3 cm. Main pulmonary artery size is within normal limits  HEART: Heart size is qualitatively withinnormal limits. There is   coronary artery calcification and/or stenting. Correlate with procedural   history. Trace pericardial fluid.  MEDIASTINUM AND FUAD: Small volume nodes.  CHEST WALL AND LOWER NECK: No chest wall hematoma. Chronic left scapular   deformity.    ABDOMEN AND PELVIS:    Solid organ evaluation is limited due to noncontrast technique.    LIVER: Liver size within normal limits  BILE DUCTS: No biliary distention  GALLBLADDER: Nonspecific gallbladder distention. Correlate with LFTs.  SPLEEN: Normal size  PANCREAS: Atrophy of the pancreatic parenchyma. No main ductal dilatation  ADRENALS: Adrenal gland thickening, nonspecific  KIDNEYS/URETERS: No hydronephrosis. Bilateral renal hypodensities     < end of copied text >  < from: CT Abdomen and Pelvis No Cont (.22 @ 21:03) >  suboptimally characterized in the absence of IV contrast.    BLADDER: Underdistended.  REPRODUCTIVE ORGANS: Prostate size within normal limits.    BOWEL: Limited evaluation the absence of oral contrast. Stomach is   underdistended. No small bowel distention. The appendix is noninflamed.   There is diffuse long segment colonic wall inflammation and large fecal   load of the rectum.  PERITONEUM: Trace fluid.  VESSELS: 6.4 x 6.2 cm infrarenal abdominal aortic aneurysm, previously   6.1 x 6.1 cm on 2020. Few apparent displaced intimal calcifications   associated with the aneurysm are new since prior CT from 2020,   possibly related to organizing peripheral mural thrombus. Borderline   aneurysmal dilatation/ectasia of the common iliac arteries as well, 1.7   cm on the right and 1.5 cm and the left, stable.  RETROPERITONEUM/LYMPH NODES: Small volume nodes.  ABDOMINAL WALL: Tiny fat-containing umbilical hernia.  BONES: Degenerative changes and osseous demineralization. L5-S1 pars   interarticularis defects redemonstrated. Compression deformities of T3   and T8-T10 vertebral bodies are stable since 2020. Chronic left   scapular deformity. Chronic left clavicular deformity partially imaged.   Left ulna fixation hardware incidentally noted. Few well marginated     < end of copied text >  < from: CT Abdomen and Pelvis No Cont (22 @ 21:03) >  subcentimeter sclerotic foci of the proximal right femur may represent   bone islands.    IMPRESSION:    Noncontrast study.    CHEST:  1. Few groundglass opacities and clustered/tree-in-bud nodularity of the   right upper lobe, and to a lesser extentthe right middle lobe, may be   infectious or inflammatory.  2. Coronary artery calcifications versus stenting. Correlate with   procedural history.    ABDOMEN/PELVIS:  1. Pan-proctocolitis. Large fecal load of rectum.  2. Redemonstrated 6.4 x 6.2 cminfrarenal abdominal aortic aneurysm,   previously 6.1 x 6.1 cm on 2020.    --- End of Report ---    < end of copied text >

## 2022-04-01 NOTE — CONSULT NOTE ADULT - GASTROINTESTINAL DETAILS
soft/nontender/no distention/no masses palpable/bowel sounds normal/no guarding/no rigidity/no organomegaly
soft/nontender/no distention/no masses palpable/bowel sounds normal/no rebound tenderness/no guarding/no rigidity

## 2022-04-01 NOTE — PROGRESS NOTE ADULT - PROBLEM SELECTOR PLAN 12
DVT ppx: SCD's  GI ppx: PPI once daily  ARCHANA resolved.   Hyperkalemia resolved. not in exacerbation  Continue albuterol prn   Monitor oxygenation

## 2022-04-01 NOTE — CONSULT NOTE ADULT - ASSESSMENT
86 year old male with multiple medical issues in NH was found to have melena, hypotension, and lactic acidosis.  he was transfused 2 u PRBC.  WBC 55,000 was found.    1. leukemoid reaction  WBC 55 but trending down to 33  mostly mature neutrophils with left shift.  probably due to sepsis  will do culture and procalcitonin  Antibiotics started.    2. melena  Hb 12.7 at admission  he had 2 u PRBC.  GI on the case

## 2022-04-01 NOTE — CONSULT NOTE ADULT - SUBJECTIVE AND OBJECTIVE BOX
[  ] STAT REQUEST              [ X ] ROUTINE REQUEST    Patient is a 86 year old male with anemia and melena. GI consulted to evaluate.       HPI:  Patient is a 86 year old male, baseline AA0x1 and minimally ambulatory, with past medical history significant for HTN, T2DM, PVOD, COPD, AAA (5.2cm), Tuberculosis, Seizure disorder, Anxiety, Vascular Dementia, Prior SAH/SDH, Iron Deficiency Anemia, Hypothyroidism, and Urinary Incontinence presented from [facility] with complaints of melena. The patient was very altered while in the ED and unable to provide any history or ROS; collateral history was obtained from facility records and Sister (Mrs. Candelaria Martin), and niece (Mrs. Viviana Moon). They reported that they had just found out about the melena today, and that he has no prior history of GIB in the past. As per med rec from facility, patient is not on ASA, or any blood thinners. Patient  was noted to be hypotensive to 60/38, , temp 98.7, and saturating 96% on RA. Initial labs showed a lactate of 6.1, WBC of 57, and Hb of 12.7; Pt was transfused 2U PRBC in and given 3L isotonic fluids upon which his BP improved. ICU was consulted initially for septic shock +/- acute GIB but as patient was fluid responsive and regained some mental status, he was found to be stable enough to go to general floor for further workup.        PAIN MANAGEMENT:  Pain Scale:                 0/10  Pain Location:      Prior Colonoscopy:  No prior colonoscopy    PAST MEDICAL HISTORY   Seizure disorder  Depression  HTN (hypertension)  Osteoarthritis  Osteoporosis  Diabetes mellitus  DM  Vitamin D deficiency  CAD  PVD    Vascular dementia  COPD       PAST SURGICAL HISTORY  No significant past surgical history        Allergies    No Known Allergies    Intolerances  None        MEDICATIONS  (STANDING):  cefepime   IVPB      cefepime   IVPB 1000 milliGRAM(s) IV Intermittent every 12 hours  dextrose 50% Injectable 25 Gram(s) IV Push once  glucagon  Injectable 1 milliGRAM(s) IntraMuscular once  insulin lispro (ADMELOG) corrective regimen sliding scale   SubCutaneous every 6 hours  lactated ringers. 1000 milliLiter(s) (75 mL/Hr) IV Continuous <Continuous>  levETIRAcetam 500 milliGRAM(s) Oral two times a day  pantoprazole Infusion 8 mG/Hr (10 mL/Hr) IV Continuous <Continuous>    MEDICATIONS  (PRN):  acetaminophen     Tablet .. 650 milliGRAM(s) Oral every 6 hours PRN Temp greater or equal to 38C (100.4F), Mild Pain (1 - 3)  aluminum hydroxide/magnesium hydroxide/simethicone Suspension 30 milliLiter(s) Oral every 4 hours PRN Dyspepsia  melatonin 3 milliGRAM(s) Oral at bedtime PRN Insomnia  ondansetron Injectable 4 milliGRAM(s) IV Push every 8 hours PRN Nausea and/or Vomiting      SOCIAL HISTORY  Advanced Directives:       [  ] Full Code       [ X ] DNR  Marital Status:         [  ] M      [ X ] S      [  ] D       [  ] W  Children:       [ X ] Yes      [  ] No  Occupation:        [  ] Employed       [ X ] Unemployed       [  ] Retired  Diet:       [ X ] Regular       [  ] PEG feeding          [  ] NG tube feeding  Drug Use:           [ X ] No             [  ] Yes  Alcohol:           [ X ] No             [  ] Yes (socially)         [  ] Yes (chronic)  Tobacco:           [  ] Yes           [ X ] No      FAMILY HISTORY  [ X ] Heart Disease            [ X ] Diabetes             [ X ] HTN             [  ] Colon Cancer             [  ] Stomach Cancer              [  ] Pancreatic Cancer      VITAL SIGNS  Vital Signs Last 24 Hrs  T(C): 36.5 (01 Apr 2022 05:06), Max: 37.6 (31 Mar 2022 16:43)  T(F): 97.7 (01 Apr 2022 05:06), Max: 99.6 (31 Mar 2022 16:43)  HR: 95 (01 Apr 2022 05:06) (95 - 119)  BP: 106/56 (01 Apr 2022 05:06) (60/38 - 153/61)   RR: 18 (01 Apr 2022 05:06) (17 - 26)  SpO2: 98% (01 Apr 2022 05:06) (96% - 100%)   Daily Height in cm: 172.72 (31 Mar 2022 15:16)           CBC Full  -  ( 01 Apr 2022 05:43 )  WBC Count : 34.83 K/uL  RBC Count : 4.04 M/uL  Hemoglobin : 13.1 g/dL  Hematocrit : 39.4 %  Platelet Count - Automated : 201 K/uL  Mean Cell Volume : 97.5 fl  Mean Cell Hemoglobin : 32.4 pg  Mean Cell Hemoglobin Concentration : 33.2 gm/dL  Auto Neutrophil # : x  Auto Lymphocyte # : x  Auto Monocyte # : x  Auto Eosinophil # : x  Auto Basophil # : x  Auto Neutrophil % : x  Auto Lymphocyte % : x  Auto Monocyte % : x  Auto Eosinophil % : x  Auto Basophil % : x      04-01    139  |  108  |  33<H>  ----------------------------<  111<H>  4.9   |  26  |  1.19    Ca    9.6      01 Apr 2022 05:43    TPro  6.4  /  Alb  2.6<L>  /  TBili  0.7  /  DBili  x   /  AST  26  /  ALT  20  /  AlkPhos  59  04-01    Lipase, Serum: 74 U/L (03-31 @ 15:34)     PT/INR - ( 01 Apr 2022 05:43 )   PT: 14.3 sec;   INR: 1.20 ratio       PTT - ( 01 Apr 2022 05:43 )  PTT:23.9 sec    Iron with Total Binding Capacity (01.01.21 @ 10:52)   Iron - Total Binding Capacity.: 254 ug/dL   % Saturation, Iron: 11 %   Iron Total, Serum: 29 ug/dL   Unsaturated Iron Binding Capacity: 225 ug/dL     Urinalysis (03.31.22 @ 21:03)   Glucose Qualitative, Urine: Negative   Blood, Urine: Moderate   pH Urine: 5.0   Color: Monik   Urine Appearance: Slightly Turbid   Bilirubin: Moderate   Ketone - Urine: Trace   Specific Gravity: 1.020   Protein, Urine: 100   Urobilinogen: 8   Nitrite: Negative   Leukocyte Esterase Concentration: Trace       ECG  Ventricular Rate 93 BPM    Atrial Rate 93 BPM    P-R Interval 226 ms    QRS Duration 116 ms    Q-T Interval 356 ms    QTC Calculation(Bazett) 442 ms    P Axis 83 degrees    R Axis -56 degrees    T Axis 43 degrees    Diagnosis Line SINUS RHYTHM [id number] DEGREE A-V BLOCK  RIGHT BUNDLE BRANCH BLOCK  LEFT ANTERIOR FASCICULAR BLOCK  *** BIFASCICULAR BLOCK ***  ABNORMAL ECG  NO PREVIOUS ECGS AVAILABLE         RADIOLOGY/IMAGING                  ******PRELIMINARY REPORT******      ******PRELIMINARY REPORT******       ACC: [id number] EXAM:  CT ABDOMEN AND PELVIS                          PROCEDURE DATE:  03/31/2022    ******PRELIMINARY REPORT******      ******PRELIMINARY REPORT******           INTERPRETATION:  No acute lung disease.    Panproctocolitis with large fecal load in rectum.    Infrarenal abdominal aortic aneurysm, unchanged. No evidence of rupture.        ******PRELIMINARY REPORT******      ******PRELIMINARY REPORT******

## 2022-04-01 NOTE — PROGRESS NOTE ADULT - PROBLEM SELECTOR PLAN 8
hypotension resolved.   Normotensive  Not on anti-HTN meds at home Supportive measures  Safety measures

## 2022-04-01 NOTE — PROGRESS NOTE ADULT - PROBLEM SELECTOR PLAN 10
Hold ASA in setting of melena Controlled  Monitor blood glucose ac/HS and cover with Admelog corrective scale  F/u A1C

## 2022-04-01 NOTE — CONSULT NOTE ADULT - ASSESSMENT
86M PMHx of AAA admitted with sepsis found to have infrarenal AAA measuring 6.4x6.2cm, asymptomatic    -BP control  -Pt may benefit from elective repair given size of AAA; however patient is likely not a surgical candidate.  -GOC discussion  -Recommend screening of family members  -Remainder of care per primary team

## 2022-04-01 NOTE — PROGRESS NOTE ADULT - PROBLEM SELECTOR PLAN 9
Controlled  Monitor blood glucose ac/HS and cover with Admelog corrective scale  F/u A1C hypotension resolved.   Normotensive  Not on anti-HTN meds at home

## 2022-04-01 NOTE — PROGRESS NOTE ADULT - SUBJECTIVE AND OBJECTIVE BOX
NP Note discussed with  Primary Attending    Patient is a 86y old  Male who presents with a chief complaint of Encephalopathy, Hypotension (2022 08:22)      INTERVAL HPI/OVERNIGHT EVENTS: no new complaints    MEDICATIONS  (STANDING):  cefepime   IVPB      cefepime   IVPB 1000 milliGRAM(s) IV Intermittent every 12 hours  dextrose 50% Injectable 25 Gram(s) IV Push once  glucagon  Injectable 1 milliGRAM(s) IntraMuscular once  insulin lispro (ADMELOG) corrective regimen sliding scale   SubCutaneous every 6 hours  lactated ringers. 1000 milliLiter(s) (75 mL/Hr) IV Continuous <Continuous>  levETIRAcetam 500 milliGRAM(s) Oral two times a day  pantoprazole Infusion 8 mG/Hr (10 mL/Hr) IV Continuous <Continuous>  polyethylene glycol 3350 17 Gram(s) Oral two times a day  senna 2 Tablet(s) Oral at bedtime    MEDICATIONS  (PRN):  acetaminophen     Tablet .. 650 milliGRAM(s) Oral every 6 hours PRN Temp greater or equal to 38C (100.4F), Mild Pain (1 - 3)  aluminum hydroxide/magnesium hydroxide/simethicone Suspension 30 milliLiter(s) Oral every 4 hours PRN Dyspepsia  melatonin 3 milliGRAM(s) Oral at bedtime PRN Insomnia  ondansetron Injectable 4 milliGRAM(s) IV Push every 8 hours PRN Nausea and/or Vomiting      __________________________________________________  REVIEW OF SYSTEMS:          Vital Signs Last 24 Hrs  T(C): 36.5 (2022 05:06), Max: 37.6 (31 Mar 2022 16:43)  T(F): 97.7 (2022 05:06), Max: 99.6 (31 Mar 2022 16:43)  HR: 95 (2022 05:06) (95 - 119)  BP: 106/56 (2022 05:06) (60/38 - 153/61)  BP(mean): --  RR: 18 (2022 05:06) (17 - 26)  SpO2: 98% (2022 05:06) (96% - 100%)    ________________________________________________  PHYSICAL EXAM:      _________________________________________________  LABS:                        13.1   34.83 )-----------( 201      ( 2022 05:43 )             39.4     04-    139  |  108  |  33<H>  ----------------------------<  111<H>  4.9   |  26  |  1.19    Ca    9.6      2022 05:43    TPro  6.4  /  Alb  2.6<L>  /  TBili  0.7  /  DBili  x   /  AST  26  /  ALT  20  /  AlkPhos  59  04-01    PT/INR - ( 2022 05:43 )   PT: 14.3 sec;   INR: 1.20 ratio         PTT - ( 2022 05:43 )  PTT:23.9 sec  Urinalysis Basic - ( 31 Mar 2022 21:03 )    Color: Monik / Appearance: Slightly Turbid / S.020 / pH: x  Gluc: x / Ketone: Trace  / Bili: Moderate / Urobili: 8   Blood: x / Protein: 100 / Nitrite: Negative   Leuk Esterase: Trace / RBC: 10-25 /HPF / WBC 3-5 /HPF   Sq Epi: x / Non Sq Epi: Few /HPF / Bacteria: Many /HPF      CAPILLARY BLOOD GLUCOSE      POCT Blood Glucose.: 118 mg/dL (2022 07:51)  POCT Blood Glucose.: 105 mg/dL (2022 05:48)  POCT Blood Glucose.: 104 mg/dL (31 Mar 2022 23:50)    COVID-19 PCR: NotDetec (31 Mar 2022 15:34)                  RADIOLOGY & ADDITIONAL TESTS:    Imaging Personally Reviewed:  YES/NO        Consultant(s) Notes Reviewed:   YES/ No    Care Discussed with Consultants :     Plan of care was discussed with patient and /or primary care giver; all questions and concerns were addressed and care was aligned with patient's wishes.     NP Note discussed with  Primary Attending    Patient is a 86y old  Male who presents with a chief complaint of Encephalopathy, Hypotension (2022 08:22)    HPI  87 y/o male, baseline AA0x1 and minimally ambulatory, with significant medical history of HTN, T2DM, PVOD, COPD, AAA (5.2cm), Tuberculosis, Seizure disorder, Anxiety, Vascular Dementia, Prior SAH/SDH, Iron Deficiency Anemia, Hypothyroidism, and Urinary Incontinence presented from [facility] with complaints of melena. Admitted for severe sepsis of unknown source in the setting of GI Bleed and acute dehydration.        INTERVAL HPI/OVERNIGHT EVENTS: no new complaints. Pt seen and examined this morning. Pt awake/alert , says "okay" when asked how he was feeling. Pt denies SOb, chest discomfort, N/V/abdominal discomfort. NAD.     MEDICATIONS  (STANDING):  cefepime   IVPB      cefepime   IVPB 1000 milliGRAM(s) IV Intermittent every 12 hours  dextrose 50% Injectable 25 Gram(s) IV Push once  glucagon  Injectable 1 milliGRAM(s) IntraMuscular once  insulin lispro (ADMELOG) corrective regimen sliding scale   SubCutaneous every 6 hours  lactated ringers. 1000 milliLiter(s) (75 mL/Hr) IV Continuous <Continuous>  levETIRAcetam 500 milliGRAM(s) Oral two times a day  pantoprazole Infusion 8 mG/Hr (10 mL/Hr) IV Continuous <Continuous>  polyethylene glycol 3350 17 Gram(s) Oral two times a day  senna 2 Tablet(s) Oral at bedtime    MEDICATIONS  (PRN):  acetaminophen     Tablet .. 650 milliGRAM(s) Oral every 6 hours PRN Temp greater or equal to 38C (100.4F), Mild Pain (1 - 3)  aluminum hydroxide/magnesium hydroxide/simethicone Suspension 30 milliLiter(s) Oral every 4 hours PRN Dyspepsia  melatonin 3 milliGRAM(s) Oral at bedtime PRN Insomnia  ondansetron Injectable 4 milliGRAM(s) IV Push every 8 hours PRN Nausea and/or Vomiting      __________________________________________________  REVIEW OF SYSTEMS:          Vital Signs Last 24 Hrs  T(C): 36.5 (2022 05:06), Max: 37.6 (31 Mar 2022 16:43)  T(F): 97.7 (2022 05:06), Max: 99.6 (31 Mar 2022 16:43)  HR: 95 (2022 05:06) (95 - 119)  BP: 106/56 (2022 05:06) (60/38 - 153/61)  BP(mean): --  RR: 18 (2022 05:06) (17 - 26)  SpO2: 98% (2022 05:06) (96% - 100%)    ________________________________________________  PHYSICAL EXAM:      _________________________________________________  LABS:                        13.1   34.83 )-----------( 201      ( 2022 05:43 )             39.4     04-01    139  |  108  |  33<H>  ----------------------------<  111<H>  4.9   |  26  |  1.19    Ca    9.6      2022 05:43    TPro  6.4  /  Alb  2.6<L>  /  TBili  0.7  /  DBili  x   /  AST  26  /  ALT  20  /  AlkPhos  59  04-01    PT/INR - ( 2022 05:43 )   PT: 14.3 sec;   INR: 1.20 ratio         PTT - ( 2022 05:43 )  PTT:23.9 sec  Urinalysis Basic - ( 31 Mar 2022 21:03 )    Color: Monik / Appearance: Slightly Turbid / S.020 / pH: x  Gluc: x / Ketone: Trace  / Bili: Moderate / Urobili: 8   Blood: x / Protein: 100 / Nitrite: Negative   Leuk Esterase: Trace / RBC: 10-25 /HPF / WBC 3-5 /HPF   Sq Epi: x / Non Sq Epi: Few /HPF / Bacteria: Many /HPF      CAPILLARY BLOOD GLUCOSE      POCT Blood Glucose.: 118 mg/dL (2022 07:51)  POCT Blood Glucose.: 105 mg/dL (2022 05:48)  POCT Blood Glucose.: 104 mg/dL (31 Mar 2022 23:50)    COVID-19 PCR: NotDetec (31 Mar 2022 15:34)                  RADIOLOGY & ADDITIONAL TESTS:    Imaging Personally Reviewed:  YES/NO        Consultant(s) Notes Reviewed:   YES/ No    Care Discussed with Consultants :     Plan of care was discussed with patient and /or primary care giver; all questions and concerns were addressed and care was aligned with patient's wishes.     NP Note discussed with  Primary Attending    Patient is a 86y old  Male who presents with a chief complaint of Encephalopathy, Hypotension (2022 08:22)    HPI  85 y/o male, baseline AA0x1 and minimally ambulatory, with significant medical history of HTN, T2DM, PVOD, COPD, AAA (5.2cm), Tuberculosis, Seizure disorder, Anxiety, Vascular Dementia, Prior SAH/SDH, Iron Deficiency Anemia, Hypothyroidism, and Urinary Incontinence presented from [facility] with complaints of melena. Admitted for severe sepsis of unknown source in the setting of GI Bleed and acute dehydration. Pt started on empiric abx, currently on Cefepime. BC/UC processing.         INTERVAL HPI/OVERNIGHT EVENTS: no new complaints. Pt seen and examined this morning. Pt awake/alert , says "okay" when asked how he was feeling. Pt denies SOb, chest discomfort, N/V/abdominal discomfort. NAD.     MEDICATIONS  (STANDING):  cefepime   IVPB      cefepime   IVPB 1000 milliGRAM(s) IV Intermittent every 12 hours  dextrose 50% Injectable 25 Gram(s) IV Push once  glucagon  Injectable 1 milliGRAM(s) IntraMuscular once  insulin lispro (ADMELOG) corrective regimen sliding scale   SubCutaneous every 6 hours  lactated ringers. 1000 milliLiter(s) (75 mL/Hr) IV Continuous <Continuous>  levETIRAcetam 500 milliGRAM(s) Oral two times a day  pantoprazole Infusion 8 mG/Hr (10 mL/Hr) IV Continuous <Continuous>  polyethylene glycol 3350 17 Gram(s) Oral two times a day  senna 2 Tablet(s) Oral at bedtime    MEDICATIONS  (PRN):  acetaminophen     Tablet .. 650 milliGRAM(s) Oral every 6 hours PRN Temp greater or equal to 38C (100.4F), Mild Pain (1 - 3)  aluminum hydroxide/magnesium hydroxide/simethicone Suspension 30 milliLiter(s) Oral every 4 hours PRN Dyspepsia  melatonin 3 milliGRAM(s) Oral at bedtime PRN Insomnia  ondansetron Injectable 4 milliGRAM(s) IV Push every 8 hours PRN Nausea and/or Vomiting      __________________________________________________  REVIEW OF SYSTEMS:    Patient denied fever, chills, headache, cough, SOB, chest discomfort, N/V/D/C, abdominal discomfort, dysuria, urinary frequency/urgency, joint/muscle discomfort.      Vital Signs Last 24 Hrs  T(C): 36.5 (2022 05:06), Max: 37.6 (31 Mar 2022 16:43)  T(F): 97.7 (2022 05:06), Max: 99.6 (31 Mar 2022 16:43)  HR: 95 (2022 05:06) (95 - 119)  BP: 106/56 (2022 05:06) (60/38 - 153/61)  BP(mean): --  RR: 18 (2022 05:06) (17 - 26)  SpO2: 98% (2022 05:06) (96% - 100%)    ________________________________________________  PHYSICAL EXAM:  GENERAL: cachectic, temporal wasting, NAD  HEAD:  Atraumatic, Normocephalic  EYES: EOMI, PERRLA, conjunctiva and sclera clear  ENMT: No tonsillar erythema, exudates, or enlargement; Moist mucous membranes, Good dentition, No lesions  NECK: Supple, No JVD, Normal thyroid  NERVOUS SYSTEM:  Awake/Alert . Follows few simple commands. Moves all extremities. No new neuro deficits.   CHEST/LUNG: Clear upper airway, decreased to bases . No rales, rhonchi, wheezing, or rubs  HEART: Regular rate and rhythm; No murmurs, rubs, or gallops  ABDOMEN: Soft, Nontender, Nondistended; Bowel sounds present  EXTREMITIES:  Muscle wasting. 2+ Peripheral Pulses, No clubbing, cyanosis, or edema  LYMPH: No lymphadenopathy noted  SKIN: Stage 1 Pressure Injury to the Coccyx and Bilateral Heels    _________________________________________________  LABS:                        13.1   34.83 )-----------( 201      ( 2022 05:43 )             39.4     04-    139  |  108  |  33<H>  ----------------------------<  111<H>  4.9   |  26  |  1.19    Ca    9.6      2022 05:43    TPro  6.4  /  Alb  2.6<L>  /  TBili  0.7  /  DBili  x   /  AST  26  /  ALT  20  /  AlkPhos  59  04-01    PT/INR - ( 2022 05:43 )   PT: 14.3 sec;   INR: 1.20 ratio  PTT - ( 2022 05:43 )  PTT:23.9 sec    Lactate, Blood: 1.6    Urinalysis Basic - ( 31 Mar 2022 21:03 )    Color: Monik / Appearance: Slightly Turbid / S.020 / pH: x  Gluc: x / Ketone: Trace  / Bili: Moderate / Urobili: 8   Blood: x / Protein: 100 / Nitrite: Negative   Leuk Esterase: Trace / RBC: 10-25 /HPF / WBC 3-5 /HPF   Sq Epi: x / Non Sq Epi: Few /HPF / Bacteria: Many /HPF      CAPILLARY BLOOD GLUCOSE      POCT Blood Glucose.: 118 mg/dL (2022 07:51)  POCT Blood Glucose.: 105 mg/dL (2022 05:48)  POCT Blood Glucose.: 104 mg/dL (31 Mar 2022 23:50)    COVID-19 PCR: NotDetec (31 Mar 2022 15:34)    RADIOLOGY & ADDITIONAL TESTS:    < from: CT Abdomen and Pelvis No Cont (22 @ 21:03) >    IMPRESSION:    Noncontrast study.    CHEST:  1. Few groundglass opacities and clustered/tree-in-bud nodularity of the   right upper lobe, and to a lesser extentthe right middle lobe, may be   infectious or inflammatory.  2. Coronary artery calcifications versus stenting. Correlate with   procedural history.    ABDOMEN/PELVIS:  1. Pan-proctocolitis. Large fecal load of rectum.  2. Redemonstrated 6.4 x 6.2 cminfrarenal abdominal aortic aneurysm,   previously 6.1 x 6.1 cm on 2020.    < end of copied text >  < from: Xray Chest 1 View- PORTABLE-Urgent (22 @ 15:41) >    IMPRESSION: Small left lung granuloma and probable dehydration again   noted.    < end of copied text >      Consultant(s) Notes Reviewed:   YES/ No    Care Discussed with Consultants :     Plan of care was discussed with patient and /or primary care giver; all questions and concerns were addressed and care was aligned with patient's wishes.     NP Note discussed with  Primary Attending    Patient is a 86y old  Male who presents with a chief complaint of Encephalopathy, Hypotension (2022 08:22)    HPI  85 y/o male, baseline AA0x1 and minimally ambulatory, with significant medical history of HTN, T2DM, PVOD, COPD, AAA (5.2cm), Tuberculosis, Seizure disorder, Anxiety, Vascular Dementia, Prior SAH/SDH, Iron Deficiency Anemia, Hypothyroidism, and Urinary Incontinence presented from [facility] with complaints of melena. Admitted for severe sepsis of unknown source in the setting of GI Bleed and acute dehydration. Pt started on empiric abx, currently on Cefepime. BC/UC processing.         INTERVAL HPI/OVERNIGHT EVENTS: no new complaints. Pt seen and examined this morning. Pt awake/alert , says "okay" when asked how he was feeling. Pt denies SOb, chest discomfort, N/V/abdominal discomfort. NAD.     MEDICATIONS  (STANDING):  cefepime   IVPB      cefepime   IVPB 1000 milliGRAM(s) IV Intermittent every 12 hours  dextrose 50% Injectable 25 Gram(s) IV Push once  glucagon  Injectable 1 milliGRAM(s) IntraMuscular once  insulin lispro (ADMELOG) corrective regimen sliding scale   SubCutaneous every 6 hours  lactated ringers. 1000 milliLiter(s) (75 mL/Hr) IV Continuous <Continuous>  levETIRAcetam 500 milliGRAM(s) Oral two times a day  pantoprazole Infusion 8 mG/Hr (10 mL/Hr) IV Continuous <Continuous>  polyethylene glycol 3350 17 Gram(s) Oral two times a day  senna 2 Tablet(s) Oral at bedtime    MEDICATIONS  (PRN):  acetaminophen     Tablet .. 650 milliGRAM(s) Oral every 6 hours PRN Temp greater or equal to 38C (100.4F), Mild Pain (1 - 3)  aluminum hydroxide/magnesium hydroxide/simethicone Suspension 30 milliLiter(s) Oral every 4 hours PRN Dyspepsia  melatonin 3 milliGRAM(s) Oral at bedtime PRN Insomnia  ondansetron Injectable 4 milliGRAM(s) IV Push every 8 hours PRN Nausea and/or Vomiting      __________________________________________________  REVIEW OF SYSTEMS:    Patient denied fever, chills, headache, cough, SOB, chest discomfort, N/V/D/C, abdominal discomfort, dysuria, urinary frequency/urgency, joint/muscle discomfort.      Vital Signs Last 24 Hrs  T(C): 36.5 (2022 05:06), Max: 37.6 (31 Mar 2022 16:43)  T(F): 97.7 (2022 05:06), Max: 99.6 (31 Mar 2022 16:43)  HR: 95 (2022 05:06) (95 - 119)  BP: 106/56 (2022 05:06) (60/38 - 153/61)  BP(mean): --  RR: 18 (2022 05:06) (17 - 26)  SpO2: 98% (2022 05:06) (96% - 100%)    ________________________________________________  PHYSICAL EXAM:  GENERAL: cachectic, temporal wasting, NAD  HEAD:  Atraumatic, Normocephalic  EYES: EOMI, PERRLA, conjunctiva and sclera clear  ENMT: No tonsillar erythema, exudates, or enlargement; Moist mucous membranes, Good dentition, No lesions  NECK: Supple, No JVD, Normal thyroid  NERVOUS SYSTEM:  Awake/Alert . Follows few simple commands. Moves all extremities. No new neuro deficits.   CHEST/LUNG: Clear upper airway, decreased to bases . No rales, rhonchi, wheezing, or rubs  HEART: Regular rate and rhythm; No murmurs, rubs, or gallops  ABDOMEN: Soft, Nontender, Nondistended; Bowel sounds present. No pulsation noted.   EXTREMITIES:  Muscle wasting. 2+ Peripheral Pulses, No clubbing, cyanosis, or edema  LYMPH: No lymphadenopathy noted  SKIN: Stage 1 Pressure Injury to the Coccyx and Bilateral Heels    _________________________________________________  LABS:                        13.1   34.83 )-----------( 201      ( 2022 05:43 )             39.4     04-    139  |  108  |  33<H>  ----------------------------<  111<H>  4.9   |  26  |  1.19    Ca    9.6      2022 05:43    TPro  6.4  /  Alb  2.6<L>  /  TBili  0.7  /  DBili  x   /  AST  26  /  ALT  20  /  AlkPhos  59  04-01    PT/INR - ( 2022 05:43 )   PT: 14.3 sec;   INR: 1.20 ratio  PTT - ( 2022 05:43 )  PTT:23.9 sec    Lactate, Blood: 1.6    Urinalysis Basic - ( 31 Mar 2022 21:03 )    Color: Monik / Appearance: Slightly Turbid / S.020 / pH: x  Gluc: x / Ketone: Trace  / Bili: Moderate / Urobili: 8   Blood: x / Protein: 100 / Nitrite: Negative   Leuk Esterase: Trace / RBC: 10-25 /HPF / WBC 3-5 /HPF   Sq Epi: x / Non Sq Epi: Few /HPF / Bacteria: Many /HPF      CAPILLARY BLOOD GLUCOSE      POCT Blood Glucose.: 118 mg/dL (2022 07:51)  POCT Blood Glucose.: 105 mg/dL (2022 05:48)  POCT Blood Glucose.: 104 mg/dL (31 Mar 2022 23:50)    COVID-19 PCR: NotDetec (31 Mar 2022 15:34)    RADIOLOGY & ADDITIONAL TESTS:    < from: CT Abdomen and Pelvis No Cont (22 @ 21:03) >    IMPRESSION:    Noncontrast study.    CHEST:  1. Few groundglass opacities and clustered/tree-in-bud nodularity of the   right upper lobe, and to a lesser extentthe right middle lobe, may be   infectious or inflammatory.  2. Coronary artery calcifications versus stenting. Correlate with   procedural history.    ABDOMEN/PELVIS:  1. Pan-proctocolitis. Large fecal load of rectum.  2. Redemonstrated 6.4 x 6.2 cminfrarenal abdominal aortic aneurysm,   previously 6.1 x 6.1 cm on 2020.    < end of copied text >  < from: Xray Chest 1 View- PORTABLE-Urgent (22 @ 15:41) >    IMPRESSION: Small left lung granuloma and probable dehydration again   noted.    < end of copied text >      Consultant(s) Notes Reviewed:   YES/ No    Care Discussed with Consultants :     Plan of care was discussed with patient and /or primary care giver; all questions and concerns were addressed and care was aligned with patient's wishes.

## 2022-04-01 NOTE — PROGRESS NOTE ADULT - SUBJECTIVE AND OBJECTIVE BOX
Patient is a 86y old  Male who presents with a chief complaint of Encephalopathy, Hypotension (01 Apr 2022 09:06)    PATIENT IS SEEN AND EXAMINED IN MEDICAL FLOOR.  NGT [    ]    BLANCA [   ]      GT [   ]    ALLERGIES:  No Known Allergies      Daily Height in cm: 172.72 (31 Mar 2022 15:16)    Daily     VITALS:    Vital Signs Last 24 Hrs  T(C): 36.5 (01 Apr 2022 05:06), Max: 37.6 (31 Mar 2022 16:43)  T(F): 97.7 (01 Apr 2022 05:06), Max: 99.6 (31 Mar 2022 16:43)  HR: 95 (01 Apr 2022 05:06) (95 - 119)  BP: 106/56 (01 Apr 2022 05:06) (60/38 - 153/61)  BP(mean): --  RR: 18 (01 Apr 2022 05:06) (17 - 26)  SpO2: 98% (01 Apr 2022 05:06) (96% - 100%)    LABS:    CBC Full  -  ( 01 Apr 2022 05:43 )  WBC Count : 34.83 K/uL  RBC Count : 4.04 M/uL  Hemoglobin : 13.1 g/dL  Hematocrit : 39.4 %  Platelet Count - Automated : 201 K/uL  Mean Cell Volume : 97.5 fl  Mean Cell Hemoglobin : 32.4 pg  Mean Cell Hemoglobin Concentration : 33.2 gm/dL  Auto Neutrophil # : x  Auto Lymphocyte # : x  Auto Monocyte # : x  Auto Eosinophil # : x  Auto Basophil # : x  Auto Neutrophil % : x  Auto Lymphocyte % : x  Auto Monocyte % : x  Auto Eosinophil % : x  Auto Basophil % : x    PT/INR - ( 01 Apr 2022 05:43 )   PT: 14.3 sec;   INR: 1.20 ratio         PTT - ( 01 Apr 2022 05:43 )  PTT:23.9 sec  04-01    139  |  107  |  x   ----------------------------<  106<H>  4.7   |  26  |  1.12    Ca    9.3      01 Apr 2022 10:07    TPro  6.4  /  Alb  2.6<L>  /  TBili  0.7  /  DBili  x   /  AST  26  /  ALT  20  /  AlkPhos  59  04-01    CAPILLARY BLOOD GLUCOSE      POCT Blood Glucose.: 118 mg/dL (01 Apr 2022 07:51)  POCT Blood Glucose.: 105 mg/dL (01 Apr 2022 05:48)  POCT Blood Glucose.: 104 mg/dL (31 Mar 2022 23:50)        LIVER FUNCTIONS - ( 01 Apr 2022 05:43 )  Alb: 2.6 g/dL / Pro: 6.4 g/dL / ALK PHOS: 59 U/L / ALT: 20 U/L DA / AST: 26 U/L / GGT: x           Creatinine Trend: 1.12<--, 1.19<--, 1.32<--, 2.15<--  I&O's Summary              MEDICATIONS:    MEDICATIONS  (STANDING):  cefepime   IVPB      cefepime   IVPB 1000 milliGRAM(s) IV Intermittent every 12 hours  dextrose 50% Injectable 25 Gram(s) IV Push once  glucagon  Injectable 1 milliGRAM(s) IntraMuscular once  insulin lispro (ADMELOG) corrective regimen sliding scale   SubCutaneous every 6 hours  lactated ringers. 1000 milliLiter(s) (75 mL/Hr) IV Continuous <Continuous>  levETIRAcetam 500 milliGRAM(s) Oral two times a day  pantoprazole Infusion 8 mG/Hr (10 mL/Hr) IV Continuous <Continuous>  polyethylene glycol 3350 17 Gram(s) Oral two times a day  senna 2 Tablet(s) Oral at bedtime      MEDICATIONS  (PRN):  acetaminophen     Tablet .. 650 milliGRAM(s) Oral every 6 hours PRN Temp greater or equal to 38C (100.4F), Mild Pain (1 - 3)  aluminum hydroxide/magnesium hydroxide/simethicone Suspension 30 milliLiter(s) Oral every 4 hours PRN Dyspepsia  melatonin 3 milliGRAM(s) Oral at bedtime PRN Insomnia  ondansetron Injectable 4 milliGRAM(s) IV Push every 8 hours PRN Nausea and/or Vomiting      REVIEW OF SYSTEMS:                           ALL ROS DONE [ X   ]    CONSTITUTIONAL:  LETHARGIC [   ], FEVER [   ], UNRESPONSIVE [   ]  CVS:  CP  [   ], SOB, [   ], PALPITATIONS [   ], DIZZYNESS [   ]  RS: COUGH [   ], SPUTUM [   ]  GI: ABDOMINAL PAIN [   ], NAUSEA [   ], VOMITINGS [   ], DIARRHEA [   ], CONSTIPATION [   ]  :  DYSURIA [   ], NOCTURIA [   ], INCREASED FREQUENCY [   ], DRIBLING [   ],  SKELETAL: PAINFUL JOINTS [   ], SWOLLEN JOINTS [   ], NECK ACHE [   ], LOW BACK ACHE [   ],  SKIN : ULCERS [   ], RASH [   ], ITCHING [   ]  CNS: HEAD ACHE [   ], DOUBLE VISION [   ], BLURRED VISION [   ], AMS / CONFUSION [   ], SEIZURES [   ], WEAKNESS [   ],TINGLING / NUMBNESS [   ]    PHYSICAL EXAMINATION:  GENERAL APPEARANCE: NO DISTRESS  HEENT:  NO PALLOR, NO  JVD,  NO   NODES, NECK SUPPLE  CVS: S1 +, S2 +,   RS: AEEB,  OCCASIONAL  RALES +,   NO RONCHI  ABD: SOFT, NT, NO, BS +  EXT: NO PE  SKIN: WARM,   SKELETAL:  ROM ACCEPTABLE  CNS:  AAO X    ,   DEFICITS    RADIOLOGY :      ASSESSMENT :     Gastrointestinal hemorrhage    Yes    No pertinent past medical history    Dementia    Seizure    Anemia    Depression    HTN (hypertension)    Osteoarthritis    Osteoporosis    Diabetes mellitus    Seizure    T2DM (type 2 diabetes mellitus)    Seizures    Vitamin D deficiency    HTN (hypertension)    History of atherosclerosis    PVD (peripheral vascular disease)    Vascular dementia    Chronic obstructive pulmonary disease, unspecified COPD type    DM (diabetes mellitus)    No significant past surgical history        PLAN:  HPI:  Patient is a 87 y/o male, baseline AA0x1 and minimally ambulatory, with significant medical history of HTN, T2DM, PVOD, COPD, AAA (5.2cm), Tuberculosis, Seizure disorder, Anxiety, Vascular Dementia, Prior SAH/SDH, Iron Deficiency Anemia, Hypothyroidism, and Urinary Incontinence presented from [facility] with complaints of melena. The patient was very altered while in the ED and unable to provide any history or ROS; collateral history was obtained from facility records and Sister (Mrs. Candelaria Martin), and niece (Mrs. Viviana Moon). They reported that they had just found out about the melena today, and that he has no prior history of GIB in the past. As per med rec from facility, patient is not on ASA, or any blood thinners. Patient  was noted to be hypotensive to 60/38, , temp 98.7, and saturating 96% on RA. Initial labs showed a lactate of 6.1, WBC of 57, and Hb of 12.7; Pt was transfused 2U PRBC in and given 3L isotonic fluids upon which his BP improved. ICU was consulted initially for septic shock +/- acute GIB but as patient was fluid responsive and regained some mental status, he was found to be stable enough to go to general floor for further workup.  (31 Mar 2022 19:25)    # HYPOTENSION SUSPECTED SEPSIS [UNCLEAR SOURCE] + HYPOVOLEMIA  # ?MELENA, UNDERLYING NALLELY - ACUTE ON CHRONIC ANEMIA  - PLACED ON BROADSPECTRUM ABX - CEFEPIME, F/U BCX AND UCX  - F/U CT CHEST/ABD/PELVIS  - PLACED ON PPI DRIP, NPO, IVF  - S/P PRBC TRANSFUSION, TRANSFUSION THRESHOLD HGB < 7  - S/P CRITICAL CARE CONSULT, GASTROENTEROLOGY CONSULT, ID CONSULT    # LEUKOCYTOSIS - R/O LEUKEMIA - F/U REPEAT CBC  - HEME/ONC CONSULT PENDING REPEAT CBC    # ARCHANA - PLACED ON IVF, REVIEWED UA, MONITOR CR, AVOID NEPHROTOXIC AGENTS    # ELEVATED LACTIC ACID - TRENDING LACTIC ACID    # HYPERKALEMIA - LOKELMA, F/U REPEAT POTASSIUM    # HYPERCALCEMIA - SUSPECT S/T DEHYDRATION - F/U IONIZED CA, F/U PTH, F/U PTHRP, F/U VIT D    # ACUTE ENCEPHALOPATHY ON ADVANCED VASCULAR DEMENTIA, HX OF SAH/SDH - PATIENT IMPROVED TO ? BASELINE AFTER IVF AND PRBC    # HTN  # T2DM  # PVOD  # COPD  # AAA  # HX OF TB  # SEIZURE DISORDER  # HYPOTHYROIDISM  # URINARY INCONTINENCE  # GI PPX.      Patient is a 86y old  Male who presents with a chief complaint of Encephalopathy, Hypotension (01 Apr 2022 09:06)    PATIENT IS SEEN AND EXAMINED IN MEDICAL FLOOR.    ALLERGIES:  No Known Allergies      Daily Height in cm: 172.72 (31 Mar 2022 15:16)    Daily     VITALS:    Vital Signs Last 24 Hrs  T(C): 36.5 (01 Apr 2022 05:06), Max: 37.6 (31 Mar 2022 16:43)  T(F): 97.7 (01 Apr 2022 05:06), Max: 99.6 (31 Mar 2022 16:43)  HR: 95 (01 Apr 2022 05:06) (95 - 119)  BP: 106/56 (01 Apr 2022 05:06) (60/38 - 153/61)  BP(mean): --  RR: 18 (01 Apr 2022 05:06) (17 - 26)  SpO2: 98% (01 Apr 2022 05:06) (96% - 100%)    LABS:    CBC Full  -  ( 01 Apr 2022 05:43 )  WBC Count : 34.83 K/uL  RBC Count : 4.04 M/uL  Hemoglobin : 13.1 g/dL  Hematocrit : 39.4 %  Platelet Count - Automated : 201 K/uL  Mean Cell Volume : 97.5 fl  Mean Cell Hemoglobin : 32.4 pg  Mean Cell Hemoglobin Concentration : 33.2 gm/dL  Auto Neutrophil # : x  Auto Lymphocyte # : x  Auto Monocyte # : x  Auto Eosinophil # : x  Auto Basophil # : x  Auto Neutrophil % : x  Auto Lymphocyte % : x  Auto Monocyte % : x  Auto Eosinophil % : x  Auto Basophil % : x    PT/INR - ( 01 Apr 2022 05:43 )   PT: 14.3 sec;   INR: 1.20 ratio         PTT - ( 01 Apr 2022 05:43 )  PTT:23.9 sec  04-01    139  |  107  |  x   ----------------------------<  106<H>  4.7   |  26  |  1.12    Ca    9.3      01 Apr 2022 10:07    TPro  6.4  /  Alb  2.6<L>  /  TBili  0.7  /  DBili  x   /  AST  26  /  ALT  20  /  AlkPhos  59  04-01    CAPILLARY BLOOD GLUCOSE      POCT Blood Glucose.: 118 mg/dL (01 Apr 2022 07:51)  POCT Blood Glucose.: 105 mg/dL (01 Apr 2022 05:48)  POCT Blood Glucose.: 104 mg/dL (31 Mar 2022 23:50)        LIVER FUNCTIONS - ( 01 Apr 2022 05:43 )  Alb: 2.6 g/dL / Pro: 6.4 g/dL / ALK PHOS: 59 U/L / ALT: 20 U/L DA / AST: 26 U/L / GGT: x           Creatinine Trend: 1.12<--, 1.19<--, 1.32<--, 2.15<--  I&O's Summary      MEDICATIONS:    MEDICATIONS  (STANDING):  cefepime   IVPB      cefepime   IVPB 1000 milliGRAM(s) IV Intermittent every 12 hours  dextrose 50% Injectable 25 Gram(s) IV Push once  glucagon  Injectable 1 milliGRAM(s) IntraMuscular once  insulin lispro (ADMELOG) corrective regimen sliding scale   SubCutaneous every 6 hours  lactated ringers. 1000 milliLiter(s) (75 mL/Hr) IV Continuous <Continuous>  levETIRAcetam 500 milliGRAM(s) Oral two times a day  pantoprazole Infusion 8 mG/Hr (10 mL/Hr) IV Continuous <Continuous>  polyethylene glycol 3350 17 Gram(s) Oral two times a day  senna 2 Tablet(s) Oral at bedtime      MEDICATIONS  (PRN):  acetaminophen     Tablet .. 650 milliGRAM(s) Oral every 6 hours PRN Temp greater or equal to 38C (100.4F), Mild Pain (1 - 3)  aluminum hydroxide/magnesium hydroxide/simethicone Suspension 30 milliLiter(s) Oral every 4 hours PRN Dyspepsia  melatonin 3 milliGRAM(s) Oral at bedtime PRN Insomnia  ondansetron Injectable 4 milliGRAM(s) IV Push every 8 hours PRN Nausea and/or Vomiting      REVIEW OF SYSTEMS:                           ALL ROS DONE [ X   ]    CONSTITUTIONAL:  LETHARGIC [   ], FEVER [   ], UNRESPONSIVE [   ]  CVS:  CP  [   ], SOB, [   ], PALPITATIONS [   ], DIZZYNESS [   ]  RS: COUGH [   ], SPUTUM [   ]  GI: ABDOMINAL PAIN [   ], NAUSEA [   ], VOMITINGS [   ], DIARRHEA [   ], CONSTIPATION [   ]  :  DYSURIA [   ], NOCTURIA [   ], INCREASED FREQUENCY [   ], DRIBLING [   ],  SKELETAL: PAINFUL JOINTS [   ], SWOLLEN JOINTS [   ], NECK ACHE [   ], LOW BACK ACHE [   ],  SKIN : ULCERS [   ], RASH [   ], ITCHING [   ]  CNS: HEAD ACHE [   ], DOUBLE VISION [   ], BLURRED VISION [   ], AMS / CONFUSION [   ], SEIZURES [   ], WEAKNESS [   ],TINGLING / NUMBNESS [   ]    PHYSICAL EXAMINATION:  GENERAL APPEARANCE: NO DISTRESS  HEENT:  NO PALLOR, NO  JVD,  NO   NODES, NECK SUPPLE  CVS: S1 +, S2 +,   RS: AEEB,  OCCASIONAL  RALES +,   NO RONCHI  ABD: SOFT, NT, NO, BS +  EXT: NO PE  SKIN: WARM,  PRESSURE ULCER+  SKELETAL:  ROM ACCEPTABLE  CNS:  AAO X 1    RADIOLOGY :    RADIOLOGY REVIEWED    ASSESSMENT :     Gastrointestinal hemorrhage    Yes    No pertinent past medical history    Dementia    Seizure    Anemia    Depression    HTN (hypertension)    Osteoarthritis    Osteoporosis    Diabetes mellitus    Seizure    T2DM (type 2 diabetes mellitus)    Seizures    Vitamin D deficiency    HTN (hypertension)    History of atherosclerosis    PVD (peripheral vascular disease)    Vascular dementia    Chronic obstructive pulmonary disease, unspecified COPD type    DM (diabetes mellitus)    No significant past surgical history        PLAN:  HPI:  Patient is a 87 y/o male, baseline AA0x1 and minimally ambulatory, with significant medical history of HTN, T2DM, PVOD, COPD, AAA (5.2cm), Tuberculosis, Seizure disorder, Anxiety, Vascular Dementia, Prior SAH/SDH, Iron Deficiency Anemia, Hypothyroidism, and Urinary Incontinence presented from [facility] with complaints of melena. The patient was very altered while in the ED and unable to provide any history or ROS; collateral history was obtained from facility records and Sister (Mrs. Candelaria Martin), and niece (Mrs. Viviana Moon). They reported that they had just found out about the melena today, and that he has no prior history of GIB in the past. As per med rec from facility, patient is not on ASA, or any blood thinners. Patient  was noted to be hypotensive to 60/38, , temp 98.7, and saturating 96% on RA. Initial labs showed a lactate of 6.1, WBC of 57, and Hb of 12.7; Pt was transfused 2U PRBC in and given 3L isotonic fluids upon which his BP improved. ICU was consulted initially for septic shock +/- acute GIB but as patient was fluid responsive and regained some mental status, he was found to be stable enough to go to general floor for further workup.  (31 Mar 2022 19:25)    # HYPOTENSION SUSPECTED SEPSIS [UNCLEAR SOURCE] + HYPOVOLEMIA  # ?MELENA, UNDERLYING NALLELY - ACUTE ON CHRONIC ANEMIA  - PLACED ON BROADSPECTRUM ABX - CEFEPIME, F/U BCX AND UCX  - F/U CT CHEST/ABD/PELVIS  - PLACED ON PPI DRIP, NPO, IVF  - S/P PRBC TRANSFUSION, TRANSFUSION THRESHOLD HGB < 7  - S/P CRITICAL CARE CONSULT, GASTROENTEROLOGY CONSULT, ID CONSULT    # LEUKOCYTOSIS - R/O LEUKEMIA - F/U REPEAT CBC  - HEME/ONC CONSULT PENDING REPEAT CBC    # ARCHANA - PLACED ON IVF, REVIEWED UA, MONITOR CR, AVOID NEPHROTOXIC AGENTS    # ELEVATED LACTIC ACID - TRENDING LACTIC ACID    # HYPERKALEMIA - LOKELMA, F/U REPEAT POTASSIUM    # HYPERCALCEMIA - SUSPECT S/T DEHYDRATION - F/U IONIZED CA, F/U PTH, F/U PTHRP, F/U VIT D    # ACUTE ENCEPHALOPATHY ON ADVANCED VASCULAR DEMENTIA, HX OF SAH/SDH - PATIENT IMPROVED TO ? BASELINE AFTER IVF AND PRBC    # HTN  # T2DM  # PVOD  # COPD  # AAA  # HX OF TB  # SEIZURE DISORDER  # HYPOTHYROIDISM  # URINARY INCONTINENCE  # GI PPX.      Patient is a 86y old  Male who presents with a chief complaint of Encephalopathy, Hypotension (01 Apr 2022 09:06)    PATIENT IS SEEN AND EXAMINED IN MEDICAL FLOOR.    ALLERGIES:  No Known Allergies      Daily Height in cm: 172.72 (31 Mar 2022 15:16)    Daily     VITALS:    Vital Signs Last 24 Hrs  T(C): 36.5 (01 Apr 2022 05:06), Max: 37.6 (31 Mar 2022 16:43)  T(F): 97.7 (01 Apr 2022 05:06), Max: 99.6 (31 Mar 2022 16:43)  HR: 95 (01 Apr 2022 05:06) (95 - 119)  BP: 106/56 (01 Apr 2022 05:06) (60/38 - 153/61)  BP(mean): --  RR: 18 (01 Apr 2022 05:06) (17 - 26)  SpO2: 98% (01 Apr 2022 05:06) (96% - 100%)    LABS:    CBC Full  -  ( 01 Apr 2022 05:43 )  WBC Count : 34.83 K/uL  RBC Count : 4.04 M/uL  Hemoglobin : 13.1 g/dL  Hematocrit : 39.4 %  Platelet Count - Automated : 201 K/uL  Mean Cell Volume : 97.5 fl  Mean Cell Hemoglobin : 32.4 pg  Mean Cell Hemoglobin Concentration : 33.2 gm/dL  Auto Neutrophil # : x  Auto Lymphocyte # : x  Auto Monocyte # : x  Auto Eosinophil # : x  Auto Basophil # : x  Auto Neutrophil % : x  Auto Lymphocyte % : x  Auto Monocyte % : x  Auto Eosinophil % : x  Auto Basophil % : x    PT/INR - ( 01 Apr 2022 05:43 )   PT: 14.3 sec;   INR: 1.20 ratio         PTT - ( 01 Apr 2022 05:43 )  PTT:23.9 sec  04-01    139  |  107  |  x   ----------------------------<  106<H>  4.7   |  26  |  1.12    Ca    9.3      01 Apr 2022 10:07    TPro  6.4  /  Alb  2.6<L>  /  TBili  0.7  /  DBili  x   /  AST  26  /  ALT  20  /  AlkPhos  59  04-01    CAPILLARY BLOOD GLUCOSE      POCT Blood Glucose.: 118 mg/dL (01 Apr 2022 07:51)  POCT Blood Glucose.: 105 mg/dL (01 Apr 2022 05:48)  POCT Blood Glucose.: 104 mg/dL (31 Mar 2022 23:50)        LIVER FUNCTIONS - ( 01 Apr 2022 05:43 )  Alb: 2.6 g/dL / Pro: 6.4 g/dL / ALK PHOS: 59 U/L / ALT: 20 U/L DA / AST: 26 U/L / GGT: x           Creatinine Trend: 1.12<--, 1.19<--, 1.32<--, 2.15<--  I&O's Summary      MEDICATIONS:    MEDICATIONS  (STANDING):  cefepime   IVPB      cefepime   IVPB 1000 milliGRAM(s) IV Intermittent every 12 hours  dextrose 50% Injectable 25 Gram(s) IV Push once  glucagon  Injectable 1 milliGRAM(s) IntraMuscular once  insulin lispro (ADMELOG) corrective regimen sliding scale   SubCutaneous every 6 hours  lactated ringers. 1000 milliLiter(s) (75 mL/Hr) IV Continuous <Continuous>  levETIRAcetam 500 milliGRAM(s) Oral two times a day  pantoprazole Infusion 8 mG/Hr (10 mL/Hr) IV Continuous <Continuous>  polyethylene glycol 3350 17 Gram(s) Oral two times a day  senna 2 Tablet(s) Oral at bedtime      MEDICATIONS  (PRN):  acetaminophen     Tablet .. 650 milliGRAM(s) Oral every 6 hours PRN Temp greater or equal to 38C (100.4F), Mild Pain (1 - 3)  aluminum hydroxide/magnesium hydroxide/simethicone Suspension 30 milliLiter(s) Oral every 4 hours PRN Dyspepsia  melatonin 3 milliGRAM(s) Oral at bedtime PRN Insomnia  ondansetron Injectable 4 milliGRAM(s) IV Push every 8 hours PRN Nausea and/or Vomiting      REVIEW OF SYSTEMS:                           ALL ROS DONE [ X   ]    CONSTITUTIONAL:  LETHARGIC [   ], FEVER [   ], UNRESPONSIVE [   ]  CVS:  CP  [   ], SOB, [   ], PALPITATIONS [   ], DIZZYNESS [   ]  RS: COUGH [   ], SPUTUM [   ]  GI: ABDOMINAL PAIN [   ], NAUSEA [   ], VOMITINGS [   ], DIARRHEA [   ], CONSTIPATION [   ]  :  DYSURIA [   ], NOCTURIA [   ], INCREASED FREQUENCY [   ], DRIBLING [   ],  SKELETAL: PAINFUL JOINTS [   ], SWOLLEN JOINTS [   ], NECK ACHE [   ], LOW BACK ACHE [   ],  SKIN : ULCERS [   ], RASH [   ], ITCHING [   ]  CNS: HEAD ACHE [   ], DOUBLE VISION [   ], BLURRED VISION [   ], AMS / CONFUSION [   ], SEIZURES [   ], WEAKNESS [   ],TINGLING / NUMBNESS [   ]    PHYSICAL EXAMINATION:  GENERAL APPEARANCE: NO DISTRESS  HEENT:  NO PALLOR, NO  JVD,  NO   NODES, NECK SUPPLE  CVS: S1 +, S2 +,   RS: AEEB,  OCCASIONAL  RALES +,   NO RONCHI  ABD: SOFT, NT, NO, BS +  EXT: NO PE  SKIN: WARM,  PRESSURE ULCER+  SKELETAL:  ROM ACCEPTABLE  CNS:  AAO X 1    RADIOLOGY :    ACC: [id number] EXAM:  CT ABDOMEN AND PELVIS                        ACC: [id number] EXAM:  CT CHEST                          PROCEDURE DATE:  03/31/2022          INTERPRETATION:  CLINICAL INFORMATION: Sepsis of unknown source.    COMPARISON: CT chest abdomen 12/31/2020    CONTRAST/COMPLICATIONS:  IV Contrast: NONE  Oral Contrast: NONE  Complications: None reported at time of study completion    PROCEDURE:  CT of the Chest, Abdomen and Pelvis was performed.  Sagittal and coronal reformats were performed.    FINDINGS:    CHEST:  LUNGS AND LARGE AIRWAYS: Trace central airway secretions. Prominent   biapical pleuroparenchymal scarring. Few groundglass opacities and   clustered tree-in-bud nodularity of the right upper lobe and to a lesser   extent,the right middle lobe. Findings may be infectious or   inflammatory. 4 mm right middle lobe nodule, image 111 series 2, stable   since 12/31/2020. Few additional calcified granulomas noted.  PLEURA: No pleural effusion or pneumothorax.  VESSELS: Atheromatous change of the thoracic aorta which is borderline   aneurysmal at the proximal segment, 3.1 cm and mildly aneurysmal at the   distal segment, 3.3 cm. Main pulmonary artery size is within normal limits  HEART: Heart size is qualitatively withinnormal limits. There is   coronary artery calcification and/or stenting. Correlate with procedural   history. Trace pericardial fluid.  MEDIASTINUM AND FUAD: Small volume nodes.  CHEST WALL AND LOWER NECK: No chest wall hematoma. Chronic left scapular   deformity.    ABDOMEN AND PELVIS:    Solid organ evaluation is limited due to noncontrast technique.    LIVER: Liver size within normal limits  BILE DUCTS: No biliary distention  GALLBLADDER: Nonspecific gallbladder distention. Correlate with LFTs.  SPLEEN: Normal size  PANCREAS: Atrophy of the pancreatic parenchyma. No main ductal dilatation  ADRENALS: Adrenal gland thickening, nonspecific  KIDNEYS/URETERS: No hydronephrosis. Bilateral renal hypodensities   suboptimally characterized in the absence of IV contrast.    BLADDER: Underdistended.  REPRODUCTIVE ORGANS: Prostate size within normal limits.    BOWEL: Limited evaluation the absence of oral contrast. Stomach is   underdistended. No small bowel distention. The appendix is noninflamed.   There is diffuse long segment colonic wall inflammation and large fecal   load of the rectum.  PERITONEUM: Trace fluid.  VESSELS: 6.4 x 6.2 cm infrarenal abdominal aortic aneurysm, previously   6.1 x 6.1 cm on 12/31/2020. Few apparent displaced intimal calcifications   associated with the aneurysm are new since prior CT from 12/31/2020,   possibly related to organizing peripheral mural thrombus. Borderline   aneurysmal dilatation/ectasia of the common iliac arteries as well, 1.7   cm on the right and 1.5 cm and the left, stable.  RETROPERITONEUM/LYMPH NODES: Small volume nodes.  ABDOMINAL WALL: Tiny fat-containing umbilical hernia.  BONES: Degenerative changes and osseous demineralization. L5-S1 pars   interarticularis defects redemonstrated. Compression deformities of T3   and T8-T10 vertebral bodies are stable since 12/31/2020. Chronic left   scapular deformity. Chronic left clavicular deformity partially imaged.   Left ulna fixation hardware incidentally noted. Few well marginated   subcentimeter sclerotic foci of the proximal right femur may represent   bone islands.    IMPRESSION:    Noncontrast study.    CHEST:  1. Few groundglass opacities and clustered/tree-in-bud nodularity of the   right upper lobe, and to a lesser extentthe right middle lobe, may be   infectious or inflammatory.  2. Coronary artery calcifications versus stenting. Correlate with   procedural history.    ABDOMEN/PELVIS:  1. Pan-proctocolitis. Large fecal load of rectum.  2. Redemonstrated 6.4 x 6.2 cminfrarenal abdominal aortic aneurysm,   previously 6.1 x 6.1 cm on 12/31/2020.      ASSESSMENT :     Gastrointestinal hemorrhage    Yes    No pertinent past medical history    Dementia    Seizure    Anemia    Depression    HTN (hypertension)    Osteoarthritis    Osteoporosis    Diabetes mellitus    Seizure    T2DM (type 2 diabetes mellitus)    Seizures    Vitamin D deficiency    HTN (hypertension)    History of atherosclerosis    PVD (peripheral vascular disease)    Vascular dementia    Chronic obstructive pulmonary disease, unspecified COPD type    DM (diabetes mellitus)    No significant past surgical history        PLAN:  HPI:  Patient is a 85 y/o male, baseline AA0x1 and minimally ambulatory, with significant medical history of HTN, T2DM, PVOD, COPD, AAA (5.2cm), Tuberculosis, Seizure disorder, Anxiety, Vascular Dementia, Prior SAH/SDH, Iron Deficiency Anemia, Hypothyroidism, and Urinary Incontinence presented from [facility] with complaints of melena. The patient was very altered while in the ED and unable to provide any history or ROS; collateral history was obtained from facility records and Sister (Mrs. Candelaria Martin), and niece (Mrs. Viviana Moon). They reported that they had just found out about the melena today, and that he has no prior history of GIB in the past. As per med rec from facility, patient is not on ASA, or any blood thinners. Patient  was noted to be hypotensive to 60/38, , temp 98.7, and saturating 96% on RA. Initial labs showed a lactate of 6.1, WBC of 57, and Hb of 12.7; Pt was transfused 2U PRBC in and given 3L isotonic fluids upon which his BP improved. ICU was consulted initially for septic shock +/- acute GIB but as patient was fluid responsive and regained some mental status, he was found to be stable enough to go to general floor for further workup.  (31 Mar 2022 19:25)    # PROGNOSIS IS GUARDED, TEAM D/W FAMILY    # HYPOTENSION SUSPECTED SEPSIS S/T PNEUMONIA AND COLITIS + HYPOVOLEMIA  # ?MELENA, UNDERLYING NALLELY - ACUTE ON CHRONIC ANEMIA  #   - PLACED ON BROADSPECTRUM ABX - CEFEPIME, F/U BCX AND UCX  - REVIEWED CT CHEST/ABD/PELVIS  - C. DIFF NEGATIVE  - PLACED ON ISOLATION  - PLACED ON PPI DRIP, NPO, IVF  - S/P PRBC TRANSFUSION, TRANSFUSION THRESHOLD HGB < 7  - S/P CRITICAL CARE CONSULT, GASTROENTEROLOGY CONSULT, ID CONSULT    # LEUKOCYTOSIS - R/O LEUKEMIA VS. LEUKEMOID REACTION - F/U REPEAT CBC  - HEME/ONC CONSULT IN PROGRESS    # ARCHANA - IMPROVING - PLACED ON IVF, REVIEWED UA, MONITOR CR, AVOID NEPHROTOXIC AGENTS    # ELEVATED LACTIC ACID - TRENDING LACTIC ACID    # HYPERKALEMIA - RESOLVED S/P LOKELMA    # HYPERCALCEMIA - SUSPECT S/T DEHYDRATION - F/U IONIZED CA, F/U PTH, F/U PTHRP, F/U VIT D    # ACUTE ENCEPHALOPATHY ON ADVANCED VASCULAR DEMENTIA, HX OF SAH/SDH - PATIENT IMPROVED TO ? BASELINE AFTER IVF AND PRBC    # AAA - VASCULAR SX CONSULT PLACED    # HTN  # T2DM  # PVOD  # COPD    # HX OF TB  # SEIZURE DISORDER  # HYPOTHYROIDISM  # URINARY INCONTINENCE  # GI PPX.

## 2022-04-02 LAB
ALBUMIN SERPL ELPH-MCNC: 2.6 G/DL — LOW (ref 3.5–5)
ALP SERPL-CCNC: 57 U/L — SIGNIFICANT CHANGE UP (ref 40–120)
ALT FLD-CCNC: 15 U/L DA — SIGNIFICANT CHANGE UP (ref 10–60)
ANION GAP SERPL CALC-SCNC: 6 MMOL/L — SIGNIFICANT CHANGE UP (ref 5–17)
AST SERPL-CCNC: 21 U/L — SIGNIFICANT CHANGE UP (ref 10–40)
BILIRUB SERPL-MCNC: 0.5 MG/DL — SIGNIFICANT CHANGE UP (ref 0.2–1.2)
BUN SERPL-MCNC: 23 MG/DL — HIGH (ref 7–18)
CA-I BLD-SCNC: 5.3 MG/DL — SIGNIFICANT CHANGE UP (ref 4.5–5.6)
CALCIUM SERPL-MCNC: 8.8 MG/DL — SIGNIFICANT CHANGE UP (ref 8.4–10.5)
CHLORIDE SERPL-SCNC: 108 MMOL/L — SIGNIFICANT CHANGE UP (ref 96–108)
CO2 SERPL-SCNC: 27 MMOL/L — SIGNIFICANT CHANGE UP (ref 22–31)
CREAT SERPL-MCNC: 0.78 MG/DL — SIGNIFICANT CHANGE UP (ref 0.5–1.3)
CULTURE RESULTS: SIGNIFICANT CHANGE UP
EGFR: 88 ML/MIN/1.73M2 — SIGNIFICANT CHANGE UP
GLUCOSE BLDC GLUCOMTR-MCNC: 101 MG/DL — HIGH (ref 70–99)
GLUCOSE BLDC GLUCOMTR-MCNC: 85 MG/DL — SIGNIFICANT CHANGE UP (ref 70–99)
GLUCOSE BLDC GLUCOMTR-MCNC: 93 MG/DL — SIGNIFICANT CHANGE UP (ref 70–99)
GLUCOSE BLDC GLUCOMTR-MCNC: 98 MG/DL — SIGNIFICANT CHANGE UP (ref 70–99)
GLUCOSE SERPL-MCNC: 90 MG/DL — SIGNIFICANT CHANGE UP (ref 70–99)
HCT VFR BLD CALC: 35 % — LOW (ref 39–50)
HGB BLD-MCNC: 11.6 G/DL — LOW (ref 13–17)
MAGNESIUM SERPL-MCNC: 2.4 MG/DL — SIGNIFICANT CHANGE UP (ref 1.6–2.6)
MCHC RBC-ENTMCNC: 32.4 PG — SIGNIFICANT CHANGE UP (ref 27–34)
MCHC RBC-ENTMCNC: 33.1 GM/DL — SIGNIFICANT CHANGE UP (ref 32–36)
MCV RBC AUTO: 97.8 FL — SIGNIFICANT CHANGE UP (ref 80–100)
NRBC # BLD: 0 /100 WBCS — SIGNIFICANT CHANGE UP (ref 0–0)
PLATELET # BLD AUTO: 186 K/UL — SIGNIFICANT CHANGE UP (ref 150–400)
POTASSIUM SERPL-MCNC: 3.5 MMOL/L — SIGNIFICANT CHANGE UP (ref 3.5–5.3)
POTASSIUM SERPL-SCNC: 3.5 MMOL/L — SIGNIFICANT CHANGE UP (ref 3.5–5.3)
PROCALCITONIN SERPL-MCNC: 25.6 NG/ML — HIGH (ref 0.02–0.1)
PROT SERPL-MCNC: 6 G/DL — SIGNIFICANT CHANGE UP (ref 6–8.3)
RBC # BLD: 3.58 M/UL — LOW (ref 4.2–5.8)
RBC # FLD: 19.6 % — HIGH (ref 10.3–14.5)
SODIUM SERPL-SCNC: 141 MMOL/L — SIGNIFICANT CHANGE UP (ref 135–145)
SPECIMEN SOURCE: SIGNIFICANT CHANGE UP
WBC # BLD: 23.08 K/UL — HIGH (ref 3.8–10.5)
WBC # FLD AUTO: 23.08 K/UL — HIGH (ref 3.8–10.5)

## 2022-04-02 PROCEDURE — 99222 1ST HOSP IP/OBS MODERATE 55: CPT

## 2022-04-02 RX ORDER — SODIUM CHLORIDE 9 MG/ML
1000 INJECTION, SOLUTION INTRAVENOUS
Refills: 0 | Status: DISCONTINUED | OUTPATIENT
Start: 2022-04-02 | End: 2022-04-06

## 2022-04-02 RX ORDER — MULTIVIT WITH MIN/MFOLATE/K2 340-15/3 G
1 POWDER (GRAM) ORAL ONCE
Refills: 0 | Status: COMPLETED | OUTPATIENT
Start: 2022-04-02 | End: 2022-04-02

## 2022-04-02 RX ADMIN — CHLORHEXIDINE GLUCONATE 1 APPLICATION(S): 213 SOLUTION TOPICAL at 05:29

## 2022-04-02 RX ADMIN — Medication 1 BOTTLE: at 22:40

## 2022-04-02 RX ADMIN — CEFEPIME 100 MILLIGRAM(S): 1 INJECTION, POWDER, FOR SOLUTION INTRAMUSCULAR; INTRAVENOUS at 05:28

## 2022-04-02 RX ADMIN — CEFEPIME 100 MILLIGRAM(S): 1 INJECTION, POWDER, FOR SOLUTION INTRAMUSCULAR; INTRAVENOUS at 18:32

## 2022-04-02 RX ADMIN — MUPIROCIN 1 APPLICATION(S): 20 OINTMENT TOPICAL at 05:38

## 2022-04-02 RX ADMIN — POLYETHYLENE GLYCOL 3350 17 GRAM(S): 17 POWDER, FOR SOLUTION ORAL at 18:31

## 2022-04-02 RX ADMIN — SENNA PLUS 2 TABLET(S): 8.6 TABLET ORAL at 21:14

## 2022-04-02 RX ADMIN — SODIUM CHLORIDE 75 MILLILITER(S): 9 INJECTION, SOLUTION INTRAVENOUS at 00:53

## 2022-04-02 RX ADMIN — LEVETIRACETAM 420 MILLIGRAM(S): 250 TABLET, FILM COATED ORAL at 18:32

## 2022-04-02 RX ADMIN — MUPIROCIN 1 APPLICATION(S): 20 OINTMENT TOPICAL at 18:32

## 2022-04-02 RX ADMIN — POLYETHYLENE GLYCOL 3350 17 GRAM(S): 17 POWDER, FOR SOLUTION ORAL at 05:30

## 2022-04-02 RX ADMIN — LEVETIRACETAM 420 MILLIGRAM(S): 250 TABLET, FILM COATED ORAL at 05:29

## 2022-04-02 RX ADMIN — PANTOPRAZOLE SODIUM 40 MILLIGRAM(S): 20 TABLET, DELAYED RELEASE ORAL at 11:32

## 2022-04-02 NOTE — PROGRESS NOTE ADULT - SUBJECTIVE AND OBJECTIVE BOX
86y Male who is looking better today, he is more awake and alert but remains confused , he appears to be coughing less, he has no fevers and his WBC count has decreased nicely. He has no fevers and is actually constipated. His blood cultures are negative and urine cultures are contaminated.    Meds:  cefepime   IVPB      cefepime   IVPB 1000 milliGRAM(s) IV Intermittent every 12 hours    Allergies    No Known Allergies    Intolerances        VITALS:  Vital Signs Last 24 Hrs  T(C): 37 (02 Apr 2022 14:12), Max: 37 (02 Apr 2022 14:12)  T(F): 98.6 (02 Apr 2022 14:12), Max: 98.6 (02 Apr 2022 14:12)  HR: 71 (02 Apr 2022 14:12) (71 - 77)  BP: 128/66 (02 Apr 2022 14:12) (126/63 - 134/72)  BP(mean): --  RR: 18 (02 Apr 2022 14:12) (18 - 18)  SpO2: 96% (02 Apr 2022 14:12) (95% - 100%)    LABS/DIAGNOSTIC TESTS:                          11.6   23.08 )-----------( 186      ( 02 Apr 2022 06:08 )             35.0         04-02    141  |  108  |  23<H>  ----------------------------<  90  3.5   |  27  |  0.78    Ca    8.8      02 Apr 2022 06:08  Mg     2.4     04-02    TPro  6.0  /  Alb  2.6<L>  /  TBili  0.5  /  DBili  x   /  AST  21  /  ALT  15  /  AlkPhos  57  04-02      LIVER FUNCTIONS - ( 02 Apr 2022 06:08 )  Alb: 2.6 g/dL / Pro: 6.0 g/dL / ALK PHOS: 57 U/L / ALT: 15 U/L DA / AST: 21 U/L / GGT: x             CULTURES: Clean Catch Clean Catch (Midstream)  04-01 @ 02:07   >=3 organisms. Probable collection contamination.  --  --      .Blood Blood  03-31 @ 21:02   No growth to date.  --  --            RADIOLOGY:      ROS:  [ x ] UNABLE TO ELICIT

## 2022-04-02 NOTE — PROGRESS NOTE ADULT - SUBJECTIVE AND OBJECTIVE BOX
Patient is a 86y old  Male who presents with a chief complaint of Encephalopathy, Hypotension (01 Apr 2022 19:48)    PATIENT IS SEEN AND EXAMINED IN MEDICAL FLOOR.  NGT [    ]    BLANCA [   ]      GT [   ]    ALLERGIES:  No Known Allergies      Daily     Daily     VITALS:    Vital Signs Last 24 Hrs  T(C): 36.4 (02 Apr 2022 05:10), Max: 36.8 (01 Apr 2022 13:00)  T(F): 97.5 (02 Apr 2022 05:10), Max: 98.3 (01 Apr 2022 21:33)  HR: 74 (02 Apr 2022 05:10) (74 - 85)  BP: 134/72 (02 Apr 2022 05:10) (118/60 - 134/72)  BP(mean): --  RR: 18 (02 Apr 2022 05:10) (18 - 20)  SpO2: 95% (02 Apr 2022 05:10) (95% - 100%)    LABS:    CBC Full  -  ( 02 Apr 2022 06:08 )  WBC Count : 23.08 K/uL  RBC Count : 3.58 M/uL  Hemoglobin : 11.6 g/dL  Hematocrit : 35.0 %  Platelet Count - Automated : 186 K/uL  Mean Cell Volume : 97.8 fl  Mean Cell Hemoglobin : 32.4 pg  Mean Cell Hemoglobin Concentration : 33.1 gm/dL  Auto Neutrophil # : x  Auto Lymphocyte # : x  Auto Monocyte # : x  Auto Eosinophil # : x  Auto Basophil # : x  Auto Neutrophil % : x  Auto Lymphocyte % : x  Auto Monocyte % : x  Auto Eosinophil % : x  Auto Basophil % : x    PT/INR - ( 01 Apr 2022 05:43 )   PT: 14.3 sec;   INR: 1.20 ratio         PTT - ( 01 Apr 2022 05:43 )  PTT:23.9 sec  04-02    141  |  108  |  23<H>  ----------------------------<  90  3.5   |  27  |  0.78    Ca    8.8      02 Apr 2022 06:08  Mg     2.4     04-02    TPro  6.0  /  Alb  2.6<L>  /  TBili  0.5  /  DBili  x   /  AST  21  /  ALT  15  /  AlkPhos  57  04-02    CAPILLARY BLOOD GLUCOSE      POCT Blood Glucose.: 93 mg/dL (02 Apr 2022 05:46)  POCT Blood Glucose.: 84 mg/dL (01 Apr 2022 23:11)  POCT Blood Glucose.: 86 mg/dL (01 Apr 2022 17:07)  POCT Blood Glucose.: 93 mg/dL (01 Apr 2022 11:28)        LIVER FUNCTIONS - ( 02 Apr 2022 06:08 )  Alb: 2.6 g/dL / Pro: 6.0 g/dL / ALK PHOS: 57 U/L / ALT: 15 U/L DA / AST: 21 U/L / GGT: x           Creatinine Trend: 0.78<--, 1.12<--, 1.19<--, 1.32<--, 2.15<--  I&O's Summary          .Blood Blood  03-31 @ 21:02   No growth to date.  --  --          MEDICATIONS:    MEDICATIONS  (STANDING):  cefepime   IVPB      cefepime   IVPB 1000 milliGRAM(s) IV Intermittent every 12 hours  chlorhexidine 2% Cloths 1 Application(s) Topical <User Schedule>  dextrose 5% + sodium chloride 0.9%. 1000 milliLiter(s) (75 mL/Hr) IV Continuous <Continuous>  dextrose 50% Injectable 25 Gram(s) IV Push once  glucagon  Injectable 1 milliGRAM(s) IntraMuscular once  insulin lispro (ADMELOG) corrective regimen sliding scale   SubCutaneous every 6 hours  levETIRAcetam  IVPB 500 milliGRAM(s) IV Intermittent every 12 hours  mupirocin 2% Ointment 1 Application(s) Both Nostrils two times a day  pantoprazole  Injectable 40 milliGRAM(s) IV Push daily  polyethylene glycol 3350 17 Gram(s) Oral two times a day  senna 2 Tablet(s) Oral at bedtime      MEDICATIONS  (PRN):  acetaminophen     Tablet .. 650 milliGRAM(s) Oral every 6 hours PRN Temp greater or equal to 38C (100.4F), Mild Pain (1 - 3)  ALBUTerol    90 MICROgram(s) HFA Inhaler 2 Puff(s) Inhalation every 6 hours PRN Shortness of Breath and/or Wheezing  aluminum hydroxide/magnesium hydroxide/simethicone Suspension 30 milliLiter(s) Oral every 4 hours PRN Dyspepsia  melatonin 3 milliGRAM(s) Oral at bedtime PRN Insomnia  ondansetron Injectable 4 milliGRAM(s) IV Push every 8 hours PRN Nausea and/or Vomiting      REVIEW OF SYSTEMS:                           ALL ROS DONE [ X   ]    CONSTITUTIONAL:  LETHARGIC [   ], FEVER [   ], UNRESPONSIVE [   ]  CVS:  CP  [   ], SOB, [   ], PALPITATIONS [   ], DIZZYNESS [   ]  RS: COUGH [   ], SPUTUM [   ]  GI: ABDOMINAL PAIN [   ], NAUSEA [   ], VOMITINGS [   ], DIARRHEA [   ], CONSTIPATION [   ]  :  DYSURIA [   ], NOCTURIA [   ], INCREASED FREQUENCY [   ], DRIBLING [   ],  SKELETAL: PAINFUL JOINTS [   ], SWOLLEN JOINTS [   ], NECK ACHE [   ], LOW BACK ACHE [   ],  SKIN : ULCERS [   ], RASH [   ], ITCHING [   ]  CNS: HEAD ACHE [   ], DOUBLE VISION [   ], BLURRED VISION [   ], AMS / CONFUSION [   ], SEIZURES [   ], WEAKNESS [   ],TINGLING / NUMBNESS [   ]    PHYSICAL EXAMINATION:  GENERAL APPEARANCE: NO DISTRESS  HEENT:  NO PALLOR, NO  JVD,  NO   NODES, NECK SUPPLE  CVS: S1 +, S2 +,   RS: AEEB,  OCCASIONAL  RALES +,   NO RONCHI  ABD: SOFT, NT, NO, BS +  EXT: NO PE  SKIN: WARM,  PRESSURE ULCER+  SKELETAL:  ROM ACCEPTABLE  CNS:  AAO X 1    RADIOLOGY :    ACC: [id number] EXAM:  CT ABDOMEN AND PELVIS                        ACC: [id number] EXAM:  CT CHEST                          PROCEDURE DATE:  03/31/2022          INTERPRETATION:  CLINICAL INFORMATION: Sepsis of unknown source.    COMPARISON: CT chest abdomen 12/31/2020    CONTRAST/COMPLICATIONS:  IV Contrast: NONE  Oral Contrast: NONE  Complications: None reported at time of study completion    PROCEDURE:  CT of the Chest, Abdomen and Pelvis was performed.  Sagittal and coronal reformats were performed.    FINDINGS:    CHEST:  LUNGS AND LARGE AIRWAYS: Trace central airway secretions. Prominent   biapical pleuroparenchymal scarring. Few groundglass opacities and   clustered tree-in-bud nodularity of the right upper lobe and to a lesser   extent,the right middle lobe. Findings may be infectious or   inflammatory. 4 mm right middle lobe nodule, image 111 series 2, stable   since 12/31/2020. Few additional calcified granulomas noted.  PLEURA: No pleural effusion or pneumothorax.  VESSELS: Atheromatous change of the thoracic aorta which is borderline   aneurysmal at the proximal segment, 3.1 cm and mildly aneurysmal at the   distal segment, 3.3 cm. Main pulmonary artery size is within normal limits  HEART: Heart size is qualitatively withinnormal limits. There is   coronary artery calcification and/or stenting. Correlate with procedural   history. Trace pericardial fluid.  MEDIASTINUM AND FUAD: Small volume nodes.  CHEST WALL AND LOWER NECK: No chest wall hematoma. Chronic left scapular   deformity.    ABDOMEN AND PELVIS:    Solid organ evaluation is limited due to noncontrast technique.    LIVER: Liver size within normal limits  BILE DUCTS: No biliary distention  GALLBLADDER: Nonspecific gallbladder distention. Correlate with LFTs.  SPLEEN: Normal size  PANCREAS: Atrophy of the pancreatic parenchyma. No main ductal dilatation  ADRENALS: Adrenal gland thickening, nonspecific  KIDNEYS/URETERS: No hydronephrosis. Bilateral renal hypodensities   suboptimally characterized in the absence of IV contrast.    BLADDER: Underdistended.  REPRODUCTIVE ORGANS: Prostate size within normal limits.    BOWEL: Limited evaluation the absence of oral contrast. Stomach is   underdistended. No small bowel distention. The appendix is noninflamed.   There is diffuse long segment colonic wall inflammation and large fecal   load of the rectum.  PERITONEUM: Trace fluid.  VESSELS: 6.4 x 6.2 cm infrarenal abdominal aortic aneurysm, previously   6.1 x 6.1 cm on 12/31/2020. Few apparent displaced intimal calcifications   associated with the aneurysm are new since prior CT from 12/31/2020,   possibly related to organizing peripheral mural thrombus. Borderline   aneurysmal dilatation/ectasia of the common iliac arteries as well, 1.7   cm on the right and 1.5 cm and the left, stable.  RETROPERITONEUM/LYMPH NODES: Small volume nodes.  ABDOMINAL WALL: Tiny fat-containing umbilical hernia.  BONES: Degenerative changes and osseous demineralization. L5-S1 pars   interarticularis defects redemonstrated. Compression deformities of T3   and T8-T10 vertebral bodies are stable since 12/31/2020. Chronic left   scapular deformity. Chronic left clavicular deformity partially imaged.   Left ulna fixation hardware incidentally noted. Few well marginated   subcentimeter sclerotic foci of the proximal right femur may represent   bone islands.    IMPRESSION:    Noncontrast study.    CHEST:  1. Few groundglass opacities and clustered/tree-in-bud nodularity of the   right upper lobe, and to a lesser extentthe right middle lobe, may be   infectious or inflammatory.  2. Coronary artery calcifications versus stenting. Correlate with   procedural history.    ABDOMEN/PELVIS:  1. Pan-proctocolitis. Large fecal load of rectum.  2. Redemonstrated 6.4 x 6.2 cminfrarenal abdominal aortic aneurysm,   previously 6.1 x 6.1 cm on 12/31/2020.      ASSESSMENT :     Gastrointestinal hemorrhage    Yes    No pertinent past medical history    Dementia    Seizure    Anemia    Depression    HTN (hypertension)    Osteoarthritis    Osteoporosis    Diabetes mellitus    Seizure    T2DM (type 2 diabetes mellitus)    Seizures    Vitamin D deficiency    HTN (hypertension)    History of atherosclerosis    PVD (peripheral vascular disease)    Vascular dementia    Chronic obstructive pulmonary disease, unspecified COPD type    DM (diabetes mellitus)    No significant past surgical history        PLAN:  HPI:  Patient is a 85 y/o male, baseline AA0x1 and minimally ambulatory, with significant medical history of HTN, T2DM, PVOD, COPD, AAA (5.2cm), Tuberculosis, Seizure disorder, Anxiety, Vascular Dementia, Prior SAH/SDH, Iron Deficiency Anemia, Hypothyroidism, and Urinary Incontinence presented from [facility] with complaints of melena. The patient was very altered while in the ED and unable to provide any history or ROS; collateral history was obtained from facility records and Sister (Mrs. Candelaria Martin), and niece (Mrs. Viviana Moon). They reported that they had just found out about the melena today, and that he has no prior history of GIB in the past. As per med rec from facility, patient is not on ASA, or any blood thinners. Patient  was noted to be hypotensive to 60/38, , temp 98.7, and saturating 96% on RA. Initial labs showed a lactate of 6.1, WBC of 57, and Hb of 12.7; Pt was transfused 2U PRBC in and given 3L isotonic fluids upon which his BP improved. ICU was consulted initially for septic shock +/- acute GIB but as patient was fluid responsive and regained some mental status, he was found to be stable enough to go to general floor for further workup.  (31 Mar 2022 19:25)    # PROGNOSIS IS GUARDED, TEAM D/W FAMILY    # HYPOTENSION SUSPECTED SEPSIS S/T PNEUMONIA AND COLITIS + HYPOVOLEMIA  # ?MELENA, UNDERLYING NALLELY - ACUTE ON CHRONIC ANEMIA  #   - PLACED ON BROADSPECTRUM ABX - CEFEPIME, F/U BCX AND UCX  - REVIEWED CT CHEST/ABD/PELVIS  - C. DIFF NEGATIVE  - PLACED ON ISOLATION  - PLACED ON PPI DRIP, NPO, IVF  - S/P PRBC TRANSFUSION, TRANSFUSION THRESHOLD HGB < 7  - S/P CRITICAL CARE CONSULT, GASTROENTEROLOGY CONSULT, ID CONSULT    # LEUKOCYTOSIS - R/O LEUKEMIA VS. LEUKEMOID REACTION - F/U REPEAT CBC  - HEME/ONC CONSULT IN PROGRESS    # ARCHANA - IMPROVING - PLACED ON IVF, REVIEWED UA, MONITOR CR, AVOID NEPHROTOXIC AGENTS    # ELEVATED LACTIC ACID - TRENDING LACTIC ACID    # HYPERKALEMIA - RESOLVED S/P LOKELMA    # HYPERCALCEMIA - SUSPECT S/T DEHYDRATION - F/U IONIZED CA, F/U PTH, F/U PTHRP, F/U VIT D    # ACUTE ENCEPHALOPATHY ON ADVANCED VASCULAR DEMENTIA, HX OF SAH/SDH - PATIENT IMPROVED TO ? BASELINE AFTER IVF AND PRBC    # AAA - VASCULAR SX CONSULT PLACED    # HTN  # T2DM  # PVOD  # COPD    # HX OF TB  # SEIZURE DISORDER  # HYPOTHYROIDISM  # URINARY INCONTINENCE  # GI PPX.    Patient is a 86y old  Male who presents with a chief complaint of Encephalopathy, Hypotension (01 Apr 2022 19:48)    PATIENT IS SEEN AND EXAMINED IN MEDICAL FLOOR.  NGT [    ]    BLANCA [   ]      GT [   ]    ALLERGIES:  No Known Allergies      Daily     Daily     VITALS:    Vital Signs Last 24 Hrs  T(C): 36.4 (02 Apr 2022 05:10), Max: 36.8 (01 Apr 2022 13:00)  T(F): 97.5 (02 Apr 2022 05:10), Max: 98.3 (01 Apr 2022 21:33)  HR: 74 (02 Apr 2022 05:10) (74 - 85)  BP: 134/72 (02 Apr 2022 05:10) (118/60 - 134/72)  BP(mean): --  RR: 18 (02 Apr 2022 05:10) (18 - 20)  SpO2: 95% (02 Apr 2022 05:10) (95% - 100%)    LABS:    CBC Full  -  ( 02 Apr 2022 06:08 )  WBC Count : 23.08 K/uL  RBC Count : 3.58 M/uL  Hemoglobin : 11.6 g/dL  Hematocrit : 35.0 %  Platelet Count - Automated : 186 K/uL  Mean Cell Volume : 97.8 fl  Mean Cell Hemoglobin : 32.4 pg  Mean Cell Hemoglobin Concentration : 33.1 gm/dL  Auto Neutrophil # : x  Auto Lymphocyte # : x  Auto Monocyte # : x  Auto Eosinophil # : x  Auto Basophil # : x  Auto Neutrophil % : x  Auto Lymphocyte % : x  Auto Monocyte % : x  Auto Eosinophil % : x  Auto Basophil % : x    PT/INR - ( 01 Apr 2022 05:43 )   PT: 14.3 sec;   INR: 1.20 ratio         PTT - ( 01 Apr 2022 05:43 )  PTT:23.9 sec  04-02    141  |  108  |  23<H>  ----------------------------<  90  3.5   |  27  |  0.78    Ca    8.8      02 Apr 2022 06:08  Mg     2.4     04-02    TPro  6.0  /  Alb  2.6<L>  /  TBili  0.5  /  DBili  x   /  AST  21  /  ALT  15  /  AlkPhos  57  04-02    CAPILLARY BLOOD GLUCOSE      POCT Blood Glucose.: 93 mg/dL (02 Apr 2022 05:46)  POCT Blood Glucose.: 84 mg/dL (01 Apr 2022 23:11)  POCT Blood Glucose.: 86 mg/dL (01 Apr 2022 17:07)  POCT Blood Glucose.: 93 mg/dL (01 Apr 2022 11:28)        LIVER FUNCTIONS - ( 02 Apr 2022 06:08 )  Alb: 2.6 g/dL / Pro: 6.0 g/dL / ALK PHOS: 57 U/L / ALT: 15 U/L DA / AST: 21 U/L / GGT: x           Creatinine Trend: 0.78<--, 1.12<--, 1.19<--, 1.32<--, 2.15<--  I&O's Summary          .Blood Blood  03-31 @ 21:02   No growth to date.  --  --          MEDICATIONS:    MEDICATIONS  (STANDING):  cefepime   IVPB      cefepime   IVPB 1000 milliGRAM(s) IV Intermittent every 12 hours  chlorhexidine 2% Cloths 1 Application(s) Topical <User Schedule>  dextrose 5% + sodium chloride 0.9%. 1000 milliLiter(s) (75 mL/Hr) IV Continuous <Continuous>  dextrose 50% Injectable 25 Gram(s) IV Push once  glucagon  Injectable 1 milliGRAM(s) IntraMuscular once  insulin lispro (ADMELOG) corrective regimen sliding scale   SubCutaneous every 6 hours  levETIRAcetam  IVPB 500 milliGRAM(s) IV Intermittent every 12 hours  mupirocin 2% Ointment 1 Application(s) Both Nostrils two times a day  pantoprazole  Injectable 40 milliGRAM(s) IV Push daily  polyethylene glycol 3350 17 Gram(s) Oral two times a day  senna 2 Tablet(s) Oral at bedtime      MEDICATIONS  (PRN):  acetaminophen     Tablet .. 650 milliGRAM(s) Oral every 6 hours PRN Temp greater or equal to 38C (100.4F), Mild Pain (1 - 3)  ALBUTerol    90 MICROgram(s) HFA Inhaler 2 Puff(s) Inhalation every 6 hours PRN Shortness of Breath and/or Wheezing  aluminum hydroxide/magnesium hydroxide/simethicone Suspension 30 milliLiter(s) Oral every 4 hours PRN Dyspepsia  melatonin 3 milliGRAM(s) Oral at bedtime PRN Insomnia  ondansetron Injectable 4 milliGRAM(s) IV Push every 8 hours PRN Nausea and/or Vomiting      REVIEW OF SYSTEMS:                           ALL ROS DONE [ X   ]    CONSTITUTIONAL:  LETHARGIC [   ], FEVER [   ], UNRESPONSIVE [   ]  CVS:  CP  [   ], SOB, [   ], PALPITATIONS [   ], DIZZYNESS [   ]  RS: COUGH [   ], SPUTUM [   ]  GI: ABDOMINAL PAIN [   ], NAUSEA [   ], VOMITINGS [   ], DIARRHEA [   ], CONSTIPATION [   ]  :  DYSURIA [   ], NOCTURIA [   ], INCREASED FREQUENCY [   ], DRIBLING [   ],  SKELETAL: PAINFUL JOINTS [   ], SWOLLEN JOINTS [   ], NECK ACHE [   ], LOW BACK ACHE [   ],  SKIN : ULCERS [   ], RASH [   ], ITCHING [   ]  CNS: HEAD ACHE [   ], DOUBLE VISION [   ], BLURRED VISION [   ], AMS / CONFUSION [   ], SEIZURES [   ], WEAKNESS [   ],TINGLING / NUMBNESS [   ]    PHYSICAL EXAMINATION:  GENERAL APPEARANCE: NO DISTRESS  HEENT:  NO PALLOR, NO  JVD,  NO   NODES, NECK SUPPLE  CVS: S1 +, S2 +,   RS: AEEB,  OCCASIONAL  RALES +,   NO RONCHI  ABD: SOFT, NT, NO, BS +  EXT: NO PE  SKIN: WARM,  PRESSURE ULCER+  SKELETAL:  ROM ACCEPTABLE  CNS:  AAO X 1    RADIOLOGY :    ACC: [id number] EXAM:  CT ABDOMEN AND PELVIS                        ACC: [id number] EXAM:  CT CHEST                          PROCEDURE DATE:  03/31/2022          INTERPRETATION:  CLINICAL INFORMATION: Sepsis of unknown source.    COMPARISON: CT chest abdomen 12/31/2020    CONTRAST/COMPLICATIONS:  IV Contrast: NONE  Oral Contrast: NONE  Complications: None reported at time of study completion    PROCEDURE:  CT of the Chest, Abdomen and Pelvis was performed.  Sagittal and coronal reformats were performed.    FINDINGS:    CHEST:  LUNGS AND LARGE AIRWAYS: Trace central airway secretions. Prominent   biapical pleuroparenchymal scarring. Few groundglass opacities and   clustered tree-in-bud nodularity of the right upper lobe and to a lesser   extent,the right middle lobe. Findings may be infectious or   inflammatory. 4 mm right middle lobe nodule, image 111 series 2, stable   since 12/31/2020. Few additional calcified granulomas noted.  PLEURA: No pleural effusion or pneumothorax.  VESSELS: Atheromatous change of the thoracic aorta which is borderline   aneurysmal at the proximal segment, 3.1 cm and mildly aneurysmal at the   distal segment, 3.3 cm. Main pulmonary artery size is within normal limits  HEART: Heart size is qualitatively withinnormal limits. There is   coronary artery calcification and/or stenting. Correlate with procedural   history. Trace pericardial fluid.  MEDIASTINUM AND FUAD: Small volume nodes.  CHEST WALL AND LOWER NECK: No chest wall hematoma. Chronic left scapular   deformity.    ABDOMEN AND PELVIS:    Solid organ evaluation is limited due to noncontrast technique.    LIVER: Liver size within normal limits  BILE DUCTS: No biliary distention  GALLBLADDER: Nonspecific gallbladder distention. Correlate with LFTs.  SPLEEN: Normal size  PANCREAS: Atrophy of the pancreatic parenchyma. No main ductal dilatation  ADRENALS: Adrenal gland thickening, nonspecific  KIDNEYS/URETERS: No hydronephrosis. Bilateral renal hypodensities   suboptimally characterized in the absence of IV contrast.    BLADDER: Underdistended.  REPRODUCTIVE ORGANS: Prostate size within normal limits.    BOWEL: Limited evaluation the absence of oral contrast. Stomach is   underdistended. No small bowel distention. The appendix is noninflamed.   There is diffuse long segment colonic wall inflammation and large fecal   load of the rectum.  PERITONEUM: Trace fluid.  VESSELS: 6.4 x 6.2 cm infrarenal abdominal aortic aneurysm, previously   6.1 x 6.1 cm on 12/31/2020. Few apparent displaced intimal calcifications   associated with the aneurysm are new since prior CT from 12/31/2020,   possibly related to organizing peripheral mural thrombus. Borderline   aneurysmal dilatation/ectasia of the common iliac arteries as well, 1.7   cm on the right and 1.5 cm and the left, stable.  RETROPERITONEUM/LYMPH NODES: Small volume nodes.  ABDOMINAL WALL: Tiny fat-containing umbilical hernia.  BONES: Degenerative changes and osseous demineralization. L5-S1 pars   interarticularis defects redemonstrated. Compression deformities of T3   and T8-T10 vertebral bodies are stable since 12/31/2020. Chronic left   scapular deformity. Chronic left clavicular deformity partially imaged.   Left ulna fixation hardware incidentally noted. Few well marginated   subcentimeter sclerotic foci of the proximal right femur may represent   bone islands.    IMPRESSION:    Noncontrast study.    CHEST:  1. Few groundglass opacities and clustered/tree-in-bud nodularity of the   right upper lobe, and to a lesser extentthe right middle lobe, may be   infectious or inflammatory.  2. Coronary artery calcifications versus stenting. Correlate with   procedural history.    ABDOMEN/PELVIS:  1. Pan-proctocolitis. Large fecal load of rectum.  2. Redemonstrated 6.4 x 6.2 cminfrarenal abdominal aortic aneurysm,   previously 6.1 x 6.1 cm on 12/31/2020.      ASSESSMENT :     Gastrointestinal hemorrhage    Yes    No pertinent past medical history    Dementia    Seizure    Anemia    Depression    HTN (hypertension)    Osteoarthritis    Osteoporosis    Diabetes mellitus    Seizure    T2DM (type 2 diabetes mellitus)    Seizures    Vitamin D deficiency    HTN (hypertension)    History of atherosclerosis    PVD (peripheral vascular disease)    Vascular dementia    Chronic obstructive pulmonary disease, unspecified COPD type    DM (diabetes mellitus)    No significant past surgical history        PLAN:  HPI:  Patient is a 85 y/o male, baseline AA0x1 and minimally ambulatory, with significant medical history of HTN, T2DM, PVOD, COPD, AAA (5.2cm), Tuberculosis, Seizure disorder, Anxiety, Vascular Dementia, Prior SAH/SDH, Iron Deficiency Anemia, Hypothyroidism, and Urinary Incontinence presented from [facility] with complaints of melena. The patient was very altered while in the ED and unable to provide any history or ROS; collateral history was obtained from facility records and Sister (Mrs. Candelaria Martin), and niece (Mrs. Mercy Moon). They reported that they had just found out about the melena today, and that he has no prior history of GIB in the past. As per med rec from facility, patient is not on ASA, or any blood thinners. Patient  was noted to be hypotensive to 60/38, , temp 98.7, and saturating 96% on RA. Initial labs showed a lactate of 6.1, WBC of 57, and Hb of 12.7; Pt was transfused 2U PRBC in and given 3L isotonic fluids upon which his BP improved. ICU was consulted initially for septic shock +/- acute GIB but as patient was fluid responsive and regained some mental status, he was found to be stable enough to go to general floor for further workup.  (31 Mar 2022 19:25)    # PROGNOSIS IS GUARDED, CASE DISCUSSED AT LENGTH WITH SISTER NORMAL MARTIN AND NIECE MISS MERCY MOON @   [phone] - ALL QUESTIONS ANSWERED    # HYPOTENSION SUSPECTED SEPSIS S/T PNEUMONIA AND COLITIS + HYPOVOLEMIA  # ?MELENA, UNDERLYING NALLELY - ACUTE ON CHRONIC ANEMIA  #   - PLACED ON BROADSPECTRUM ABX - CEFEPIME, F/U BCX AND UCX  - REVIEWED CT CHEST/ABD/PELVIS  - C. DIFF NEGATIVE  - PLACED ON ISOLATION  - PLACED ON PPI DRIP, ADVANCING DIET AS TOLERATED, IVF  - S/P PRBC TRANSFUSION, TRANSFUSION THRESHOLD HGB < 7  - S/P CRITICAL CARE CONSULT, GASTROENTEROLOGY CONSULT, ID CONSULT    # LEUKOCYTOSIS - R/O LEUKEMIA VS. LEUKEMOID REACTION - F/U REPEAT CBC  - HEME/ONC CONSULT IN PROGRESS    # ARCHANA - IMPROVING - PLACED ON IVF, REVIEWED UA, MONITOR CR, AVOID NEPHROTOXIC AGENTS    # ELEVATED LACTIC ACID - TRENDING LACTIC ACID    # HYPERKALEMIA - RESOLVED S/P LOKELMA    # HYPERCALCEMIA - SUSPECT S/T DEHYDRATION - F/U IONIZED CA, F/U PTH, F/U PTHRP, F/U VIT D    # ACUTE ENCEPHALOPATHY ON ADVANCED VASCULAR DEMENTIA, HX OF SAH/SDH - PATIENT IMPROVED TO ? BASELINE AFTER IVF AND PRBC    # AAA - VASCULAR SX CONSULT PLACED    # HTN  # T2DM  # PVOD  # COPD    # HX OF TB  # SEIZURE DISORDER  # HYPOTHYROIDISM  # URINARY INCONTINENCE  # GI PPX.

## 2022-04-03 LAB
ANION GAP SERPL CALC-SCNC: 5 MMOL/L — SIGNIFICANT CHANGE UP (ref 5–17)
BUN SERPL-MCNC: 9 MG/DL — SIGNIFICANT CHANGE UP (ref 7–18)
CALCIUM SERPL-MCNC: 8.9 MG/DL — SIGNIFICANT CHANGE UP (ref 8.4–10.5)
CHLORIDE SERPL-SCNC: 106 MMOL/L — SIGNIFICANT CHANGE UP (ref 96–108)
CO2 SERPL-SCNC: 28 MMOL/L — SIGNIFICANT CHANGE UP (ref 22–31)
CREAT SERPL-MCNC: 0.52 MG/DL — SIGNIFICANT CHANGE UP (ref 0.5–1.3)
EGFR: 99 ML/MIN/1.73M2 — SIGNIFICANT CHANGE UP
GLUCOSE BLDC GLUCOMTR-MCNC: 102 MG/DL — HIGH (ref 70–99)
GLUCOSE BLDC GLUCOMTR-MCNC: 106 MG/DL — HIGH (ref 70–99)
GLUCOSE BLDC GLUCOMTR-MCNC: 111 MG/DL — HIGH (ref 70–99)
GLUCOSE BLDC GLUCOMTR-MCNC: 88 MG/DL — SIGNIFICANT CHANGE UP (ref 70–99)
GLUCOSE BLDC GLUCOMTR-MCNC: 97 MG/DL — SIGNIFICANT CHANGE UP (ref 70–99)
GLUCOSE SERPL-MCNC: 96 MG/DL — SIGNIFICANT CHANGE UP (ref 70–99)
HCT VFR BLD CALC: 34.5 % — LOW (ref 39–50)
HGB BLD-MCNC: 11.3 G/DL — LOW (ref 13–17)
MCHC RBC-ENTMCNC: 31.9 PG — SIGNIFICANT CHANGE UP (ref 27–34)
MCHC RBC-ENTMCNC: 32.8 GM/DL — SIGNIFICANT CHANGE UP (ref 32–36)
MCV RBC AUTO: 97.5 FL — SIGNIFICANT CHANGE UP (ref 80–100)
NRBC # BLD: 0 /100 WBCS — SIGNIFICANT CHANGE UP (ref 0–0)
PLATELET # BLD AUTO: 163 K/UL — SIGNIFICANT CHANGE UP (ref 150–400)
POTASSIUM SERPL-MCNC: 2.9 MMOL/L — CRITICAL LOW (ref 3.5–5.3)
POTASSIUM SERPL-SCNC: 2.9 MMOL/L — CRITICAL LOW (ref 3.5–5.3)
RBC # BLD: 3.54 M/UL — LOW (ref 4.2–5.8)
RBC # FLD: 18.1 % — HIGH (ref 10.3–14.5)
SODIUM SERPL-SCNC: 139 MMOL/L — SIGNIFICANT CHANGE UP (ref 135–145)
WBC # BLD: 10.88 K/UL — HIGH (ref 3.8–10.5)
WBC # FLD AUTO: 10.88 K/UL — HIGH (ref 3.8–10.5)

## 2022-04-03 RX ORDER — POTASSIUM CHLORIDE 20 MEQ
20 PACKET (EA) ORAL
Refills: 0 | Status: COMPLETED | OUTPATIENT
Start: 2022-04-03 | End: 2022-04-03

## 2022-04-03 RX ADMIN — CEFEPIME 100 MILLIGRAM(S): 1 INJECTION, POWDER, FOR SOLUTION INTRAMUSCULAR; INTRAVENOUS at 17:57

## 2022-04-03 RX ADMIN — CHLORHEXIDINE GLUCONATE 1 APPLICATION(S): 213 SOLUTION TOPICAL at 05:00

## 2022-04-03 RX ADMIN — POLYETHYLENE GLYCOL 3350 17 GRAM(S): 17 POWDER, FOR SOLUTION ORAL at 05:02

## 2022-04-03 RX ADMIN — Medication 20 MILLIEQUIVALENT(S): at 12:05

## 2022-04-03 RX ADMIN — MUPIROCIN 1 APPLICATION(S): 20 OINTMENT TOPICAL at 17:58

## 2022-04-03 RX ADMIN — PANTOPRAZOLE SODIUM 40 MILLIGRAM(S): 20 TABLET, DELAYED RELEASE ORAL at 12:05

## 2022-04-03 RX ADMIN — MUPIROCIN 1 APPLICATION(S): 20 OINTMENT TOPICAL at 05:02

## 2022-04-03 RX ADMIN — POLYETHYLENE GLYCOL 3350 17 GRAM(S): 17 POWDER, FOR SOLUTION ORAL at 17:57

## 2022-04-03 RX ADMIN — LEVETIRACETAM 420 MILLIGRAM(S): 250 TABLET, FILM COATED ORAL at 06:09

## 2022-04-03 RX ADMIN — LEVETIRACETAM 420 MILLIGRAM(S): 250 TABLET, FILM COATED ORAL at 17:58

## 2022-04-03 RX ADMIN — Medication 20 MILLIEQUIVALENT(S): at 08:24

## 2022-04-03 RX ADMIN — Medication 20 MILLIEQUIVALENT(S): at 09:40

## 2022-04-03 RX ADMIN — CEFEPIME 100 MILLIGRAM(S): 1 INJECTION, POWDER, FOR SOLUTION INTRAMUSCULAR; INTRAVENOUS at 05:01

## 2022-04-03 RX ADMIN — SENNA PLUS 2 TABLET(S): 8.6 TABLET ORAL at 22:08

## 2022-04-03 NOTE — PROVIDER CONTACT NOTE (CRITICAL VALUE NOTIFICATION) - TEST AND RESULT REPORTED:
Carina Sanchez was admitted to [facility] from PACU via cart accompanied by daughter.   Reason for hospitalization is psuedoaneurysm .   Upon arrival, patient is stable. Patient has history significant for MI DM, HTN , .  Patient oriented to bed, call light, , room and unit.  Patient provided with the following educational materials upon admission:safety, advanced directives, infection control and pain.   Level of understanding patient verbalized understanding.   Admission orders received at this time.      See Epic documentation for patient individualized nursing care plan.   Potassium 2.9.

## 2022-04-03 NOTE — PROGRESS NOTE ADULT - ASSESSMENT
1. Iron deficiency anemia  2. Melena stopped  3. No evidence of acute GI bleeding  4. Sepsis  5. Colitis  6. Hypokalemia    Suggestions:    1. Monitor H/H  2.Transfuse PRBC as needed  3. Protonix daily  4. Replace K+  5. ID Follow up  6. Antibiotics as per ID  7. Avoid NSAID  8. DVT prophylaxis

## 2022-04-03 NOTE — CHART NOTE - NSCHARTNOTEFT_GEN_A_CORE
Patient is a 86y old  Male who presents with a chief complaint of Encephalopathy, Hypotension (02 Apr 2022 16:59)    Vital Signs Last 24 Hrs  T(F): 98.6 (03 Apr 2022 05:00), Max: 98.6 (02 Apr 2022 14:12)  HR: 69 (03 Apr 2022 05:00) (69 - 73)  BP: 130/68 (03 Apr 2022 05:00) (128/66 - 137/69)  RR: 18 (03 Apr 2022 05:00) (18 - 18)  SpO2: 95% (03 Apr 2022 05:00) (95% - 96%)    PAST MEDICAL & SURGICAL HISTORY:  Dementia Seizure Anemia Depression HTN (hypertension) Osteoarthritis Osteoporosis Diabetes mellitus  T2DM (type 2 diabetes mellitus)  History of atherosclerosis  PVD (peripheral vascular disease)  Chronic obstructive pulmonary disease, unspecified COPD type    LABS:                    11.3   10.88 )-----------( 163      ( 03 Apr 2022 05:54 )             34.5     04-03    139  |  106  |  9   ----------------------------<  96  2.9<LL>   |  28  |  0.52    Ca    8.9      03 Apr 2022 05:54  Mg     2.4     04-02    TPro  6.0  /  Alb  2.6<L>  /  TBili  0.5  /  DBili  x   /  AST  21  /  ALT  15  /  AlkPhos  57  04-02    ASSESSMENT AND PLAN   86y old  Male who admitted with a chief complaint of Encephalopathy, Hypotension and GIB   morning labs review with Hypokalemia K+2.9  patient on liquid diet  will replenish with KCL powder 20Mq every 2 hours x 3 doses   f/u BMP     Care Collaborated Discussed with Consultants/Other Providers [x] YES  [ ] NO

## 2022-04-03 NOTE — PROGRESS NOTE ADULT - SUBJECTIVE AND OBJECTIVE BOX
[   ] ICU                                          [   ] CCU                                      [  X ] Medical Floor    Patient is a 86 year old male with anemia and melena. GI consulted to evaluate.        Patient is a 86 year old male, baseline AA0x1 and minimally ambulatory, with past medical history significant for HTN, T2DM, PVOD, COPD, AAA (5.2cm), Tuberculosis, Seizure disorder, Anxiety, Vascular Dementia, Prior SAH/SDH, Iron Deficiency Anemia, Hypothyroidism, and Urinary Incontinence presented from [facility] with complaints of melena. The patient was very altered while in the ED and unable to provide any history or ROS; collateral history was obtained from facility records and Sister (Mrs. Candelaria Martin), and niece (Mrs. Viviana Moon). They reported that they had just found out about the melena today, and that he has no prior history of GIB in the past. As per med rec from facility, patient is not on ASA, or any blood thinners. Patient  was noted to be hypotensive to 60/38, , temp 98.7, and saturating 96% on RA. Initial labs showed a lactate of 6.1, WBC of 57, and Hb of 12.7; Pt was transfused 2U PRBC in and given 3L isotonic fluids upon which his BP improved. ICU was consulted initially for septic shock +/- acute GIB but as patient was fluid responsive and regained some mental status, he was found to be stable enough to go to general floor for further workup.      Patient appears comfortable. No new complaints reported, No abdominal pain, N/V, hematemesis, hematochezia, melena, fever, chills, chest pain, SOB, cough or diarrhea reported.      PAIN MANAGEMENT:  Pain Scale:                 0/10  Pain Location:      Prior Colonoscopy:  No prior colonoscopy    PAST MEDICAL HISTORY   Seizure disorder  Depression  HTN (hypertension)  Osteoarthritis  Osteoporosis  Diabetes mellitus  DM  Vitamin D deficiency  CAD  PVD    Vascular dementia  COPD       PAST SURGICAL HISTORY  No significant past surgical history        Allergies    No Known Allergies    Intolerances  None       SOCIAL HISTORY  Advanced Directives:       [  ] Full Code       [ X ] DNR  Marital Status:         [  ] M      [ X ] S      [  ] D       [  ] W  Children:       [ X ] Yes      [  ] No  Occupation:        [  ] Employed       [ X ] Unemployed       [  ] Retired  Diet:       [ X ] Regular       [  ] PEG feeding          [  ] NG tube feeding  Drug Use:           [ X ] No             [  ] Yes  Alcohol:           [ X ] No             [  ] Yes (socially)         [  ] Yes (chronic)  Tobacco:           [  ] Yes           [ X ] No      FAMILY HISTORY  [ X ] Heart Disease            [ X ] Diabetes             [ X ] HTN             [  ] Colon Cancer             [  ] Stomach Cancer              [  ] Pancreatic Cancer          VITALS  Vital Signs Last 24 Hrs  T(C): 37 (03 Apr 2022 05:00), Max: 37 (02 Apr 2022 14:12)  T(F): 98.6 (03 Apr 2022 05:00), Max: 98.6 (02 Apr 2022 14:12)  HR: 69 (03 Apr 2022 05:00) (69 - 73)  BP: 130/68 (03 Apr 2022 05:00) (128/66 - 137/69)   RR: 18 (03 Apr 2022 05:00) (18 - 18)  SpO2: 95% (03 Apr 2022 05:00) (95% - 96%)       MEDICATIONS  (STANDING):  cefepime   IVPB 1000 milliGRAM(s) IV Intermittent every 12 hours  cefepime   IVPB      chlorhexidine 2% Cloths 1 Application(s) Topical <User Schedule>  dextrose 5% + sodium chloride 0.9%. 1000 milliLiter(s) (75 mL/Hr) IV Continuous <Continuous>  dextrose 50% Injectable 25 Gram(s) IV Push once  glucagon  Injectable 1 milliGRAM(s) IntraMuscular once  insulin lispro (ADMELOG) corrective regimen sliding scale   SubCutaneous every 6 hours  levETIRAcetam  IVPB 500 milliGRAM(s) IV Intermittent every 12 hours  mupirocin 2% Ointment 1 Application(s) Both Nostrils two times a day  pantoprazole  Injectable 40 milliGRAM(s) IV Push daily  polyethylene glycol 3350 17 Gram(s) Oral two times a day  potassium chloride   Powder 20 milliEquivalent(s) Oral every 2 hours  senna 2 Tablet(s) Oral at bedtime    MEDICATIONS  (PRN):  acetaminophen     Tablet .. 650 milliGRAM(s) Oral every 6 hours PRN Temp greater or equal to 38C (100.4F), Mild Pain (1 - 3)  ALBUTerol    90 MICROgram(s) HFA Inhaler 2 Puff(s) Inhalation every 6 hours PRN Shortness of Breath and/or Wheezing  aluminum hydroxide/magnesium hydroxide/simethicone Suspension 30 milliLiter(s) Oral every 4 hours PRN Dyspepsia  melatonin 3 milliGRAM(s) Oral at bedtime PRN Insomnia  ondansetron Injectable 4 milliGRAM(s) IV Push every 8 hours PRN Nausea and/or Vomiting                            11.3   10.88 )-----------( 163      ( 03 Apr 2022 05:54 )             34.5       04-03    139  |  106  |  9   ----------------------------<  96  2.9<LL>   |  28  |  0.52    Ca    8.9      03 Apr 2022 05:54  Mg     2.4     04-02    TPro  6.0  /  Alb  2.6<L>  /  TBili  0.5  /  DBili  x   /  AST  21  /  ALT  15  /  AlkPhos  57  04-02

## 2022-04-03 NOTE — PROGRESS NOTE ADULT - SUBJECTIVE AND OBJECTIVE BOX
Patient is a 86y old  Male who presents with a chief complaint of Encephalopathy, Hypotension (03 Apr 2022 11:21)    PATIENT IS SEEN AND EXAMINED IN MEDICAL FLOOR.  NGT [    ]    BLANCA [   ]      GT [   ]    ALLERGIES:  No Known Allergies      Daily     Daily     VITALS:    Vital Signs Last 24 Hrs  T(C): 37 (03 Apr 2022 05:00), Max: 37 (02 Apr 2022 14:12)  T(F): 98.6 (03 Apr 2022 05:00), Max: 98.6 (02 Apr 2022 14:12)  HR: 69 (03 Apr 2022 05:00) (69 - 73)  BP: 130/68 (03 Apr 2022 05:00) (128/66 - 137/69)  BP(mean): --  RR: 18 (03 Apr 2022 05:00) (18 - 18)  SpO2: 95% (03 Apr 2022 05:00) (95% - 96%)    LABS:    CBC Full  -  ( 03 Apr 2022 05:54 )  WBC Count : 10.88 K/uL  RBC Count : 3.54 M/uL  Hemoglobin : 11.3 g/dL  Hematocrit : 34.5 %  Platelet Count - Automated : 163 K/uL  Mean Cell Volume : 97.5 fl  Mean Cell Hemoglobin : 31.9 pg  Mean Cell Hemoglobin Concentration : 32.8 gm/dL  Auto Neutrophil # : x  Auto Lymphocyte # : x  Auto Monocyte # : x  Auto Eosinophil # : x  Auto Basophil # : x  Auto Neutrophil % : x  Auto Lymphocyte % : x  Auto Monocyte % : x  Auto Eosinophil % : x  Auto Basophil % : x      04-03    139  |  106  |  9   ----------------------------<  96  2.9<LL>   |  28  |  0.52    Ca    8.9      03 Apr 2022 05:54  Mg     2.4     04-02    TPro  6.0  /  Alb  2.6<L>  /  TBili  0.5  /  DBili  x   /  AST  21  /  ALT  15  /  AlkPhos  57  04-02    CAPILLARY BLOOD GLUCOSE      POCT Blood Glucose.: 102 mg/dL (03 Apr 2022 12:09)  POCT Blood Glucose.: 111 mg/dL (03 Apr 2022 05:53)  POCT Blood Glucose.: 97 mg/dL (03 Apr 2022 00:09)  POCT Blood Glucose.: 101 mg/dL (02 Apr 2022 21:07)  POCT Blood Glucose.: 85 mg/dL (02 Apr 2022 16:56)        LIVER FUNCTIONS - ( 02 Apr 2022 06:08 )  Alb: 2.6 g/dL / Pro: 6.0 g/dL / ALK PHOS: 57 U/L / ALT: 15 U/L DA / AST: 21 U/L / GGT: x           Creatinine Trend: 0.52<--, 0.78<--, 1.12<--, 1.19<--, 1.32<--, 2.15<--  I&O's Summary          Clean Catch Clean Catch (Midstream)  04-01 @ 02:07   >=3 organisms. Probable collection contamination.  --  --      .Blood Blood  03-31 @ 21:02   No growth to date.  --  --          MEDICATIONS:    MEDICATIONS  (STANDING):  cefepime   IVPB      cefepime   IVPB 1000 milliGRAM(s) IV Intermittent every 12 hours  chlorhexidine 2% Cloths 1 Application(s) Topical <User Schedule>  dextrose 5% + sodium chloride 0.9%. 1000 milliLiter(s) (75 mL/Hr) IV Continuous <Continuous>  dextrose 50% Injectable 25 Gram(s) IV Push once  glucagon  Injectable 1 milliGRAM(s) IntraMuscular once  insulin lispro (ADMELOG) corrective regimen sliding scale   SubCutaneous every 6 hours  levETIRAcetam  IVPB 500 milliGRAM(s) IV Intermittent every 12 hours  mupirocin 2% Ointment 1 Application(s) Both Nostrils two times a day  pantoprazole  Injectable 40 milliGRAM(s) IV Push daily  polyethylene glycol 3350 17 Gram(s) Oral two times a day  senna 2 Tablet(s) Oral at bedtime      MEDICATIONS  (PRN):  acetaminophen     Tablet .. 650 milliGRAM(s) Oral every 6 hours PRN Temp greater or equal to 38C (100.4F), Mild Pain (1 - 3)  ALBUTerol    90 MICROgram(s) HFA Inhaler 2 Puff(s) Inhalation every 6 hours PRN Shortness of Breath and/or Wheezing  aluminum hydroxide/magnesium hydroxide/simethicone Suspension 30 milliLiter(s) Oral every 4 hours PRN Dyspepsia  melatonin 3 milliGRAM(s) Oral at bedtime PRN Insomnia  ondansetron Injectable 4 milliGRAM(s) IV Push every 8 hours PRN Nausea and/or Vomiting      REVIEW OF SYSTEMS:                           ALL ROS DONE [ X   ]    CONSTITUTIONAL:  LETHARGIC [   ], FEVER [   ], UNRESPONSIVE [   ]  CVS:  CP  [   ], SOB, [   ], PALPITATIONS [   ], DIZZYNESS [   ]  RS: COUGH [   ], SPUTUM [   ]  GI: ABDOMINAL PAIN [   ], NAUSEA [   ], VOMITINGS [   ], DIARRHEA [   ], CONSTIPATION [   ]  :  DYSURIA [   ], NOCTURIA [   ], INCREASED FREQUENCY [   ], DRIBLING [   ],  SKELETAL: PAINFUL JOINTS [   ], SWOLLEN JOINTS [   ], NECK ACHE [   ], LOW BACK ACHE [   ],  SKIN : ULCERS [   ], RASH [   ], ITCHING [   ]  CNS: HEAD ACHE [   ], DOUBLE VISION [   ], BLURRED VISION [   ], AMS / CONFUSION [   ], SEIZURES [   ], WEAKNESS [   ],TINGLING / NUMBNESS [   ]    PHYSICAL EXAMINATION:  GENERAL APPEARANCE: NO DISTRESS  HEENT:  NO PALLOR, NO  JVD,  NO   NODES, NECK SUPPLE  CVS: S1 +, S2 +,   RS: AEEB,  OCCASIONAL  RALES +,   NO RONCHI  ABD: SOFT, NT, NO, BS +  EXT: NO PE  SKIN: WARM,  PRESSURE ULCER+  SKELETAL:  ROM ACCEPTABLE  CNS:  AAO X 1    RADIOLOGY :    ACC: [id number] EXAM:  CT ABDOMEN AND PELVIS                        ACC: [id number] EXAM:  CT CHEST                          PROCEDURE DATE:  03/31/2022          INTERPRETATION:  CLINICAL INFORMATION: Sepsis of unknown source.    COMPARISON: CT chest abdomen 12/31/2020    CONTRAST/COMPLICATIONS:  IV Contrast: NONE  Oral Contrast: NONE  Complications: None reported at time of study completion    PROCEDURE:  CT of the Chest, Abdomen and Pelvis was performed.  Sagittal and coronal reformats were performed.    FINDINGS:    CHEST:  LUNGS AND LARGE AIRWAYS: Trace central airway secretions. Prominent   biapical pleuroparenchymal scarring. Few groundglass opacities and   clustered tree-in-bud nodularity of the right upper lobe and to a lesser   extent,the right middle lobe. Findings may be infectious or   inflammatory. 4 mm right middle lobe nodule, image 111 series 2, stable   since 12/31/2020. Few additional calcified granulomas noted.  PLEURA: No pleural effusion or pneumothorax.  VESSELS: Atheromatous change of the thoracic aorta which is borderline   aneurysmal at the proximal segment, 3.1 cm and mildly aneurysmal at the   distal segment, 3.3 cm. Main pulmonary artery size is within normal limits  HEART: Heart size is qualitatively withinnormal limits. There is   coronary artery calcification and/or stenting. Correlate with procedural   history. Trace pericardial fluid.  MEDIASTINUM AND FUAD: Small volume nodes.  CHEST WALL AND LOWER NECK: No chest wall hematoma. Chronic left scapular   deformity.    ABDOMEN AND PELVIS:    Solid organ evaluation is limited due to noncontrast technique.    LIVER: Liver size within normal limits  BILE DUCTS: No biliary distention  GALLBLADDER: Nonspecific gallbladder distention. Correlate with LFTs.  SPLEEN: Normal size  PANCREAS: Atrophy of the pancreatic parenchyma. No main ductal dilatation  ADRENALS: Adrenal gland thickening, nonspecific  KIDNEYS/URETERS: No hydronephrosis. Bilateral renal hypodensities   suboptimally characterized in the absence of IV contrast.    BLADDER: Underdistended.  REPRODUCTIVE ORGANS: Prostate size within normal limits.    BOWEL: Limited evaluation the absence of oral contrast. Stomach is   underdistended. No small bowel distention. The appendix is noninflamed.   There is diffuse long segment colonic wall inflammation and large fecal   load of the rectum.  PERITONEUM: Trace fluid.  VESSELS: 6.4 x 6.2 cm infrarenal abdominal aortic aneurysm, previously   6.1 x 6.1 cm on 12/31/2020. Few apparent displaced intimal calcifications   associated with the aneurysm are new since prior CT from 12/31/2020,   possibly related to organizing peripheral mural thrombus. Borderline   aneurysmal dilatation/ectasia of the common iliac arteries as well, 1.7   cm on the right and 1.5 cm and the left, stable.  RETROPERITONEUM/LYMPH NODES: Small volume nodes.  ABDOMINAL WALL: Tiny fat-containing umbilical hernia.  BONES: Degenerative changes and osseous demineralization. L5-S1 pars   interarticularis defects redemonstrated. Compression deformities of T3   and T8-T10 vertebral bodies are stable since 12/31/2020. Chronic left   scapular deformity. Chronic left clavicular deformity partially imaged.   Left ulna fixation hardware incidentally noted. Few well marginated   subcentimeter sclerotic foci of the proximal right femur may represent   bone islands.    IMPRESSION:    Noncontrast study.    CHEST:  1. Few groundglass opacities and clustered/tree-in-bud nodularity of the   right upper lobe, and to a lesser extentthe right middle lobe, may be   infectious or inflammatory.  2. Coronary artery calcifications versus stenting. Correlate with   procedural history.    ABDOMEN/PELVIS:  1. Pan-proctocolitis. Large fecal load of rectum.  2. Redemonstrated 6.4 x 6.2 cminfrarenal abdominal aortic aneurysm,   previously 6.1 x 6.1 cm on 12/31/2020.      ASSESSMENT :     Gastrointestinal hemorrhage    Yes    No pertinent past medical history    Dementia    Seizure    Anemia    Depression    HTN (hypertension)    Osteoarthritis    Osteoporosis    Diabetes mellitus    Seizure    T2DM (type 2 diabetes mellitus)    Seizures    Vitamin D deficiency    HTN (hypertension)    History of atherosclerosis    PVD (peripheral vascular disease)    Vascular dementia    Chronic obstructive pulmonary disease, unspecified COPD type    DM (diabetes mellitus)    No significant past surgical history        PLAN:  HPI:  Patient is a 85 y/o male, baseline AA0x1 and minimally ambulatory, with significant medical history of HTN, T2DM, PVOD, COPD, AAA (5.2cm), Tuberculosis, Seizure disorder, Anxiety, Vascular Dementia, Prior SAH/SDH, Iron Deficiency Anemia, Hypothyroidism, and Urinary Incontinence presented from [facility] with complaints of melena. The patient was very altered while in the ED and unable to provide any history or ROS; collateral history was obtained from facility records and Sister (Mrs. Candelaria Martin), and niece (Mrs. Mercy Moon). They reported that they had just found out about the melena today, and that he has no prior history of GIB in the past. As per med rec from facility, patient is not on ASA, or any blood thinners. Patient  was noted to be hypotensive to 60/38, , temp 98.7, and saturating 96% on RA. Initial labs showed a lactate of 6.1, WBC of 57, and Hb of 12.7; Pt was transfused 2U PRBC in and given 3L isotonic fluids upon which his BP improved. ICU was consulted initially for septic shock +/- acute GIB but as patient was fluid responsive and regained some mental status, he was found to be stable enough to go to general floor for further workup.  (31 Mar 2022 19:25)    # PROGNOSIS IS GUARDED, CASE DISCUSSED AT LENGTH WITH SISTER NORMAL MARTIN AND NIECE MISS MERCY MOON @   [phone] - ALL QUESTIONS ANSWERED    # HYPOTENSION SUSPECTED SEPSIS S/T PNEUMONIA AND COLITIS + HYPOVOLEMIA  # ?MELENA, UNDERLYING NALLELY - ACUTE ON CHRONIC ANEMIA  #   - PLACED ON BROADSPECTRUM ABX - CEFEPIME, F/U BCX AND UCX  - REVIEWED CT CHEST/ABD/PELVIS  - C. DIFF NEGATIVE  - PLACED ON ISOLATION  - PLACED ON PPI DRIP, ADVANCING DIET AS TOLERATED, IVF  - S/P PRBC TRANSFUSION, TRANSFUSION THRESHOLD HGB < 7  - S/P CRITICAL CARE CONSULT, GASTROENTEROLOGY CONSULT, ID CONSULT    # LEUKOCYTOSIS - R/O LEUKEMIA VS. LEUKEMOID REACTION - F/U REPEAT CBC  - HEME/ONC CONSULT IN PROGRESS    # ARCHANA - IMPROVING - PLACED ON IVF, REVIEWED UA, MONITOR CR, AVOID NEPHROTOXIC AGENTS    # ELEVATED LACTIC ACID - TRENDING LACTIC ACID    # HYPERKALEMIA - RESOLVED S/P LOKELMA    # HYPERCALCEMIA - SUSPECT S/T DEHYDRATION - F/U IONIZED CA, F/U PTH, F/U PTHRP, F/U VIT D    # ACUTE ENCEPHALOPATHY ON ADVANCED VASCULAR DEMENTIA, HX OF SAH/SDH - PATIENT IMPROVED TO ? BASELINE AFTER IVF AND PRBC    # AAA - VASCULAR SX CONSULT PLACED    # HTN  # T2DM  # PVOD  # COPD    # HX OF TB  # SEIZURE DISORDER  # HYPOTHYROIDISM  # URINARY INCONTINENCE  # GI PPX.    Patient is a 86y old  Male who presents with a chief complaint of Encephalopathy, Hypotension (03 Apr 2022 11:21)    PATIENT IS SEEN AND EXAMINED IN MEDICAL FLOOR.    ALLERGIES:  No Known Allergies      Daily     Daily     VITALS:    Vital Signs Last 24 Hrs  T(C): 37 (03 Apr 2022 05:00), Max: 37 (02 Apr 2022 14:12)  T(F): 98.6 (03 Apr 2022 05:00), Max: 98.6 (02 Apr 2022 14:12)  HR: 69 (03 Apr 2022 05:00) (69 - 73)  BP: 130/68 (03 Apr 2022 05:00) (128/66 - 137/69)  BP(mean): --  RR: 18 (03 Apr 2022 05:00) (18 - 18)  SpO2: 95% (03 Apr 2022 05:00) (95% - 96%)    LABS:    CBC Full  -  ( 03 Apr 2022 05:54 )  WBC Count : 10.88 K/uL  RBC Count : 3.54 M/uL  Hemoglobin : 11.3 g/dL  Hematocrit : 34.5 %  Platelet Count - Automated : 163 K/uL  Mean Cell Volume : 97.5 fl  Mean Cell Hemoglobin : 31.9 pg  Mean Cell Hemoglobin Concentration : 32.8 gm/dL  Auto Neutrophil # : x  Auto Lymphocyte # : x  Auto Monocyte # : x  Auto Eosinophil # : x  Auto Basophil # : x  Auto Neutrophil % : x  Auto Lymphocyte % : x  Auto Monocyte % : x  Auto Eosinophil % : x  Auto Basophil % : x      04-03    139  |  106  |  9   ----------------------------<  96  2.9<LL>   |  28  |  0.52    Ca    8.9      03 Apr 2022 05:54  Mg     2.4     04-02    TPro  6.0  /  Alb  2.6<L>  /  TBili  0.5  /  DBili  x   /  AST  21  /  ALT  15  /  AlkPhos  57  04-02    CAPILLARY BLOOD GLUCOSE      POCT Blood Glucose.: 102 mg/dL (03 Apr 2022 12:09)  POCT Blood Glucose.: 111 mg/dL (03 Apr 2022 05:53)  POCT Blood Glucose.: 97 mg/dL (03 Apr 2022 00:09)  POCT Blood Glucose.: 101 mg/dL (02 Apr 2022 21:07)  POCT Blood Glucose.: 85 mg/dL (02 Apr 2022 16:56)        LIVER FUNCTIONS - ( 02 Apr 2022 06:08 )  Alb: 2.6 g/dL / Pro: 6.0 g/dL / ALK PHOS: 57 U/L / ALT: 15 U/L DA / AST: 21 U/L / GGT: x           Creatinine Trend: 0.52<--, 0.78<--, 1.12<--, 1.19<--, 1.32<--, 2.15<--  I&O's Summary      Clean Catch Clean Catch (Midstream)  04-01 @ 02:07   >=3 organisms. Probable collection contamination.  --  --      .Blood Blood  03-31 @ 21:02   No growth to date.  --  --      MEDICATIONS:    MEDICATIONS  (STANDING):  cefepime   IVPB      cefepime   IVPB 1000 milliGRAM(s) IV Intermittent every 12 hours  chlorhexidine 2% Cloths 1 Application(s) Topical <User Schedule>  dextrose 5% + sodium chloride 0.9%. 1000 milliLiter(s) (75 mL/Hr) IV Continuous <Continuous>  dextrose 50% Injectable 25 Gram(s) IV Push once  glucagon  Injectable 1 milliGRAM(s) IntraMuscular once  insulin lispro (ADMELOG) corrective regimen sliding scale   SubCutaneous every 6 hours  levETIRAcetam  IVPB 500 milliGRAM(s) IV Intermittent every 12 hours  mupirocin 2% Ointment 1 Application(s) Both Nostrils two times a day  pantoprazole  Injectable 40 milliGRAM(s) IV Push daily  polyethylene glycol 3350 17 Gram(s) Oral two times a day  senna 2 Tablet(s) Oral at bedtime      MEDICATIONS  (PRN):  acetaminophen     Tablet .. 650 milliGRAM(s) Oral every 6 hours PRN Temp greater or equal to 38C (100.4F), Mild Pain (1 - 3)  ALBUTerol    90 MICROgram(s) HFA Inhaler 2 Puff(s) Inhalation every 6 hours PRN Shortness of Breath and/or Wheezing  aluminum hydroxide/magnesium hydroxide/simethicone Suspension 30 milliLiter(s) Oral every 4 hours PRN Dyspepsia  melatonin 3 milliGRAM(s) Oral at bedtime PRN Insomnia  ondansetron Injectable 4 milliGRAM(s) IV Push every 8 hours PRN Nausea and/or Vomiting      REVIEW OF SYSTEMS:                           ALL ROS DONE [ X ]    CONSTITUTIONAL:  LETHARGIC [   ], FEVER [   ], UNRESPONSIVE [   ]  CVS:  CP  [   ], SOB, [   ], PALPITATIONS [   ], DIZZYNESS [   ]  RS: COUGH [   ], SPUTUM [   ]  GI: ABDOMINAL PAIN [   ], NAUSEA [   ], VOMITINGS [   ], DIARRHEA [   ], CONSTIPATION [   ]  :  DYSURIA [   ], NOCTURIA [   ], INCREASED FREQUENCY [   ], DRIBLING [   ],  SKELETAL: PAINFUL JOINTS [   ], SWOLLEN JOINTS [   ], NECK ACHE [   ], LOW BACK ACHE [   ],  SKIN : ULCERS [   ], RASH [   ], ITCHING [   ]  CNS: HEAD ACHE [   ], DOUBLE VISION [   ], BLURRED VISION [   ], AMS / CONFUSION [   ], SEIZURES [   ], WEAKNESS [   ],TINGLING / NUMBNESS [   ]    PHYSICAL EXAMINATION:  GENERAL APPEARANCE: NO DISTRESS  HEENT:  NO PALLOR, NO  JVD,  NO   NODES, NECK SUPPLE  CVS: S1 +, S2 +,   RS: AEEB,  OCCASIONAL  RALES +,   NO RONCHI  ABD: SOFT, NT, NO, BS +  EXT: NO PE  SKIN: WARM,  PRESSURE ULCER+  SKELETAL:  ROM ACCEPTABLE  CNS:  AAO X 1    RADIOLOGY :    ACC: [id number] EXAM:  CT ABDOMEN AND PELVIS                        ACC: [id number] EXAM:  CT CHEST                          PROCEDURE DATE:  03/31/2022          INTERPRETATION:  CLINICAL INFORMATION: Sepsis of unknown source.    COMPARISON: CT chest abdomen 12/31/2020    CONTRAST/COMPLICATIONS:  IV Contrast: NONE  Oral Contrast: NONE  Complications: None reported at time of study completion    PROCEDURE:  CT of the Chest, Abdomen and Pelvis was performed.  Sagittal and coronal reformats were performed.    FINDINGS:    CHEST:  LUNGS AND LARGE AIRWAYS: Trace central airway secretions. Prominent   biapical pleuroparenchymal scarring. Few groundglass opacities and   clustered tree-in-bud nodularity of the right upper lobe and to a lesser   extent,the right middle lobe. Findings may be infectious or   inflammatory. 4 mm right middle lobe nodule, image 111 series 2, stable   since 12/31/2020. Few additional calcified granulomas noted.  PLEURA: No pleural effusion or pneumothorax.  VESSELS: Atheromatous change of the thoracic aorta which is borderline   aneurysmal at the proximal segment, 3.1 cm and mildly aneurysmal at the   distal segment, 3.3 cm. Main pulmonary artery size is within normal limits  HEART: Heart size is qualitatively withinnormal limits. There is   coronary artery calcification and/or stenting. Correlate with procedural   history. Trace pericardial fluid.  MEDIASTINUM AND FUAD: Small volume nodes.  CHEST WALL AND LOWER NECK: No chest wall hematoma. Chronic left scapular   deformity.    ABDOMEN AND PELVIS:    Solid organ evaluation is limited due to noncontrast technique.    LIVER: Liver size within normal limits  BILE DUCTS: No biliary distention  GALLBLADDER: Nonspecific gallbladder distention. Correlate with LFTs.  SPLEEN: Normal size  PANCREAS: Atrophy of the pancreatic parenchyma. No main ductal dilatation  ADRENALS: Adrenal gland thickening, nonspecific  KIDNEYS/URETERS: No hydronephrosis. Bilateral renal hypodensities   suboptimally characterized in the absence of IV contrast.    BLADDER: Underdistended.  REPRODUCTIVE ORGANS: Prostate size within normal limits.    BOWEL: Limited evaluation the absence of oral contrast. Stomach is   underdistended. No small bowel distention. The appendix is noninflamed.   There is diffuse long segment colonic wall inflammation and large fecal   load of the rectum.  PERITONEUM: Trace fluid.  VESSELS: 6.4 x 6.2 cm infrarenal abdominal aortic aneurysm, previously   6.1 x 6.1 cm on 12/31/2020. Few apparent displaced intimal calcifications   associated with the aneurysm are new since prior CT from 12/31/2020,   possibly related to organizing peripheral mural thrombus. Borderline   aneurysmal dilatation/ectasia of the common iliac arteries as well, 1.7   cm on the right and 1.5 cm and the left, stable.  RETROPERITONEUM/LYMPH NODES: Small volume nodes.  ABDOMINAL WALL: Tiny fat-containing umbilical hernia.  BONES: Degenerative changes and osseous demineralization. L5-S1 pars   interarticularis defects redemonstrated. Compression deformities of T3   and T8-T10 vertebral bodies are stable since 12/31/2020. Chronic left   scapular deformity. Chronic left clavicular deformity partially imaged.   Left ulna fixation hardware incidentally noted. Few well marginated   subcentimeter sclerotic foci of the proximal right femur may represent   bone islands.    IMPRESSION:    Noncontrast study.    CHEST:  1. Few groundglass opacities and clustered/tree-in-bud nodularity of the   right upper lobe, and to a lesser extentthe right middle lobe, may be   infectious or inflammatory.  2. Coronary artery calcifications versus stenting. Correlate with   procedural history.    ABDOMEN/PELVIS:  1. Pan-proctocolitis. Large fecal load of rectum.  2. Redemonstrated 6.4 x 6.2 cminfrarenal abdominal aortic aneurysm,   previously 6.1 x 6.1 cm on 12/31/2020.      ASSESSMENT :     Gastrointestinal hemorrhage    Yes    No pertinent past medical history    Dementia    Seizure    Anemia    Depression    HTN (hypertension)    Osteoarthritis    Osteoporosis    Diabetes mellitus    Seizure    T2DM (type 2 diabetes mellitus)    Seizures    Vitamin D deficiency    HTN (hypertension)    History of atherosclerosis    PVD (peripheral vascular disease)    Vascular dementia    Chronic obstructive pulmonary disease, unspecified COPD type    DM (diabetes mellitus)    No significant past surgical history        PLAN:  HPI:  Patient is a 85 y/o male, baseline AA0x1 and minimally ambulatory, with significant medical history of HTN, T2DM, PVOD, COPD, AAA (5.2cm), Tuberculosis, Seizure disorder, Anxiety, Vascular Dementia, Prior SAH/SDH, Iron Deficiency Anemia, Hypothyroidism, and Urinary Incontinence presented from [facility] with complaints of melena. The patient was very altered while in the ED and unable to provide any history or ROS; collateral history was obtained from facility records and Sister (Mrs. Candelaria Torres), and niece (Mrs. Mercy Blankenship). They reported that they had just found out about the melena today, and that he has no prior history of GIB in the past. As per med rec from facility, patient is not on ASA, or any blood thinners. Patient  was noted to be hypotensive to 60/38, , temp 98.7, and saturating 96% on RA. Initial labs showed a lactate of 6.1, WBC of 57, and Hb of 12.7; Pt was transfused 2U PRBC in and given 3L isotonic fluids upon which his BP improved. ICU was consulted initially for septic shock +/- acute GIB but as patient was fluid responsive and regained some mental status, he was found to be stable enough to go to general floor for further workup.  (31 Mar 2022 19:25)    # PROGNOSIS IS GUARDED, CASE DISCUSSED AT LENGTH WITH SISTER NORMAL TORRES AND NIECE MISS MERCY BLANKENSHIP @   [phone] - ALL QUESTIONS ANSWERED    # HYPOTENSION SUSPECTED SEPSIS S/T PNEUMONIA AND COLITIS + HYPOVOLEMIA  # ?MELENA, UNDERLYING NALLELY - ACUTE ON CHRONIC ANEMIA  # CONSTIPATION, OBSTIPATION  - PLACED ON BROADSPECTRUM ABX - CEFEPIME, F/U BCX AND UCX  - REVIEWED CT CHEST/ABD/PELVIS  - C. DIFF NEGATIVE  - PLACED ON ISOLATION  - PLACED ON PPI DRIP, ADVANCING DIET AS TOLERATED, IVF  - S/P PRBC TRANSFUSION, TRANSFUSION THRESHOLD HGB < 7  - S/P CRITICAL CARE CONSULT, GASTROENTEROLOGY CONSULT, ID CONSULT    # LEUKOCYTOSIS - R/O LEUKEMIA VS. LEUKEMOID REACTION - IMPROVING  - HEME/ONC CONSULT IN PROGRESS    # ARCHANA - IMPROVING - PLACED ON IVF, REVIEWED UA, MONITOR CR, AVOID NEPHROTOXIC AGENTS    # ELEVATED LACTIC ACID - TRENDING LACTIC ACID    # HYPERKALEMIA - RESOLVED S/P LOKELMA    # HYPERCALCEMIA - SUSPECT S/T DEHYDRATION - F/U IONIZED CA, F/U PTH, F/U PTHRP, F/U VIT D    # ACUTE ENCEPHALOPATHY ON ADVANCED VASCULAR DEMENTIA, HX OF SAH/SDH - PATIENT IMPROVED TO ? BASELINE AFTER IVF AND PRBC    # AAA - VASCULAR SX CONSULT PLACED    # PRESSURE ULCER - WOUND CARE, PATIENT IS HIGH RISK FOR PRESSURE ULCERS DESPITE PREVENTIVE MEASURES IN PLACE    # SUSPECT SEVERE PROTEIN CALORIE NUTRITION - NUTRITIONAL SUPPLEMENT    # URINARY INCONTINENCE    # HTN  # T2DM  # PVOD  # COPD    # HX OF TB  # SEIZURE DISORDER  # HYPOTHYROIDISM  # GI PPX   Patient is a 86y old  Male who presents with a chief complaint of Encephalopathy, Hypotension (03 Apr 2022 11:21)    PATIENT IS SEEN AND EXAMINED IN MEDICAL FLOOR.    ALLERGIES:  No Known Allergies      Daily     Daily     VITALS:    Vital Signs Last 24 Hrs  T(C): 37 (03 Apr 2022 05:00), Max: 37 (02 Apr 2022 14:12)  T(F): 98.6 (03 Apr 2022 05:00), Max: 98.6 (02 Apr 2022 14:12)  HR: 69 (03 Apr 2022 05:00) (69 - 73)  BP: 130/68 (03 Apr 2022 05:00) (128/66 - 137/69)  BP(mean): --  RR: 18 (03 Apr 2022 05:00) (18 - 18)  SpO2: 95% (03 Apr 2022 05:00) (95% - 96%)    LABS:    CBC Full  -  ( 03 Apr 2022 05:54 )  WBC Count : 10.88 K/uL  RBC Count : 3.54 M/uL  Hemoglobin : 11.3 g/dL  Hematocrit : 34.5 %  Platelet Count - Automated : 163 K/uL  Mean Cell Volume : 97.5 fl  Mean Cell Hemoglobin : 31.9 pg  Mean Cell Hemoglobin Concentration : 32.8 gm/dL  Auto Neutrophil # : x  Auto Lymphocyte # : x  Auto Monocyte # : x  Auto Eosinophil # : x  Auto Basophil # : x  Auto Neutrophil % : x  Auto Lymphocyte % : x  Auto Monocyte % : x  Auto Eosinophil % : x  Auto Basophil % : x      04-03    139  |  106  |  9   ----------------------------<  96  2.9<LL>   |  28  |  0.52    Ca    8.9      03 Apr 2022 05:54  Mg     2.4     04-02    TPro  6.0  /  Alb  2.6<L>  /  TBili  0.5  /  DBili  x   /  AST  21  /  ALT  15  /  AlkPhos  57  04-02    CAPILLARY BLOOD GLUCOSE      POCT Blood Glucose.: 102 mg/dL (03 Apr 2022 12:09)  POCT Blood Glucose.: 111 mg/dL (03 Apr 2022 05:53)  POCT Blood Glucose.: 97 mg/dL (03 Apr 2022 00:09)  POCT Blood Glucose.: 101 mg/dL (02 Apr 2022 21:07)  POCT Blood Glucose.: 85 mg/dL (02 Apr 2022 16:56)        LIVER FUNCTIONS - ( 02 Apr 2022 06:08 )  Alb: 2.6 g/dL / Pro: 6.0 g/dL / ALK PHOS: 57 U/L / ALT: 15 U/L DA / AST: 21 U/L / GGT: x           Creatinine Trend: 0.52<--, 0.78<--, 1.12<--, 1.19<--, 1.32<--, 2.15<--  I&O's Summary      Clean Catch Clean Catch (Midstream)  04-01 @ 02:07   >=3 organisms. Probable collection contamination.  --  --      .Blood Blood  03-31 @ 21:02   No growth to date.  --  --      MEDICATIONS:    MEDICATIONS  (STANDING):  cefepime   IVPB      cefepime   IVPB 1000 milliGRAM(s) IV Intermittent every 12 hours  chlorhexidine 2% Cloths 1 Application(s) Topical <User Schedule>  dextrose 5% + sodium chloride 0.9%. 1000 milliLiter(s) (75 mL/Hr) IV Continuous <Continuous>  dextrose 50% Injectable 25 Gram(s) IV Push once  glucagon  Injectable 1 milliGRAM(s) IntraMuscular once  insulin lispro (ADMELOG) corrective regimen sliding scale   SubCutaneous every 6 hours  levETIRAcetam  IVPB 500 milliGRAM(s) IV Intermittent every 12 hours  mupirocin 2% Ointment 1 Application(s) Both Nostrils two times a day  pantoprazole  Injectable 40 milliGRAM(s) IV Push daily  polyethylene glycol 3350 17 Gram(s) Oral two times a day  senna 2 Tablet(s) Oral at bedtime      MEDICATIONS  (PRN):  acetaminophen     Tablet .. 650 milliGRAM(s) Oral every 6 hours PRN Temp greater or equal to 38C (100.4F), Mild Pain (1 - 3)  ALBUTerol    90 MICROgram(s) HFA Inhaler 2 Puff(s) Inhalation every 6 hours PRN Shortness of Breath and/or Wheezing  aluminum hydroxide/magnesium hydroxide/simethicone Suspension 30 milliLiter(s) Oral every 4 hours PRN Dyspepsia  melatonin 3 milliGRAM(s) Oral at bedtime PRN Insomnia  ondansetron Injectable 4 milliGRAM(s) IV Push every 8 hours PRN Nausea and/or Vomiting      REVIEW OF SYSTEMS:                           ALL ROS DONE [ X ]    CONSTITUTIONAL:  LETHARGIC [   ], FEVER [   ], UNRESPONSIVE [   ]  CVS:  CP  [   ], SOB, [   ], PALPITATIONS [   ], DIZZYNESS [   ]  RS: COUGH [   ], SPUTUM [   ]  GI: ABDOMINAL PAIN [   ], NAUSEA [   ], VOMITINGS [   ], DIARRHEA [   ], CONSTIPATION [   ]  :  DYSURIA [   ], NOCTURIA [   ], INCREASED FREQUENCY [   ], DRIBLING [   ],  SKELETAL: PAINFUL JOINTS [   ], SWOLLEN JOINTS [   ], NECK ACHE [   ], LOW BACK ACHE [   ],  SKIN : ULCERS [   ], RASH [   ], ITCHING [   ]  CNS: HEAD ACHE [   ], DOUBLE VISION [   ], BLURRED VISION [   ], AMS / CONFUSION [   ], SEIZURES [   ], WEAKNESS [   ],TINGLING / NUMBNESS [   ]    PHYSICAL EXAMINATION:  GENERAL APPEARANCE: NO DISTRESS  HEENT:  NO PALLOR, NO  JVD,  NO   NODES, NECK SUPPLE  CVS: S1 +, S2 +,   RS: AEEB,  OCCASIONAL  RALES +,   NO RONCHI  ABD: SOFT, NT, NO, BS +  EXT: NO PE  SKIN: WARM,  PRESSURE ULCER+  SKELETAL:  ROM ACCEPTABLE  CNS:  AAO X 1    RADIOLOGY :    ACC: [id number] EXAM:  CT ABDOMEN AND PELVIS                        ACC: [id number] EXAM:  CT CHEST                          PROCEDURE DATE:  03/31/2022          INTERPRETATION:  CLINICAL INFORMATION: Sepsis of unknown source.    COMPARISON: CT chest abdomen 12/31/2020    CONTRAST/COMPLICATIONS:  IV Contrast: NONE  Oral Contrast: NONE  Complications: None reported at time of study completion    PROCEDURE:  CT of the Chest, Abdomen and Pelvis was performed.  Sagittal and coronal reformats were performed.    FINDINGS:    CHEST:  LUNGS AND LARGE AIRWAYS: Trace central airway secretions. Prominent   biapical pleuroparenchymal scarring. Few groundglass opacities and   clustered tree-in-bud nodularity of the right upper lobe and to a lesser   extent,the right middle lobe. Findings may be infectious or   inflammatory. 4 mm right middle lobe nodule, image 111 series 2, stable   since 12/31/2020. Few additional calcified granulomas noted.  PLEURA: No pleural effusion or pneumothorax.  VESSELS: Atheromatous change of the thoracic aorta which is borderline   aneurysmal at the proximal segment, 3.1 cm and mildly aneurysmal at the   distal segment, 3.3 cm. Main pulmonary artery size is within normal limits  HEART: Heart size is qualitatively withinnormal limits. There is   coronary artery calcification and/or stenting. Correlate with procedural   history. Trace pericardial fluid.  MEDIASTINUM AND FUAD: Small volume nodes.  CHEST WALL AND LOWER NECK: No chest wall hematoma. Chronic left scapular   deformity.    ABDOMEN AND PELVIS:    Solid organ evaluation is limited due to noncontrast technique.    LIVER: Liver size within normal limits  BILE DUCTS: No biliary distention  GALLBLADDER: Nonspecific gallbladder distention. Correlate with LFTs.  SPLEEN: Normal size  PANCREAS: Atrophy of the pancreatic parenchyma. No main ductal dilatation  ADRENALS: Adrenal gland thickening, nonspecific  KIDNEYS/URETERS: No hydronephrosis. Bilateral renal hypodensities   suboptimally characterized in the absence of IV contrast.    BLADDER: Underdistended.  REPRODUCTIVE ORGANS: Prostate size within normal limits.    BOWEL: Limited evaluation the absence of oral contrast. Stomach is   underdistended. No small bowel distention. The appendix is noninflamed.   There is diffuse long segment colonic wall inflammation and large fecal   load of the rectum.  PERITONEUM: Trace fluid.  VESSELS: 6.4 x 6.2 cm infrarenal abdominal aortic aneurysm, previously   6.1 x 6.1 cm on 12/31/2020. Few apparent displaced intimal calcifications   associated with the aneurysm are new since prior CT from 12/31/2020,   possibly related to organizing peripheral mural thrombus. Borderline   aneurysmal dilatation/ectasia of the common iliac arteries as well, 1.7   cm on the right and 1.5 cm and the left, stable.  RETROPERITONEUM/LYMPH NODES: Small volume nodes.  ABDOMINAL WALL: Tiny fat-containing umbilical hernia.  BONES: Degenerative changes and osseous demineralization. L5-S1 pars   interarticularis defects redemonstrated. Compression deformities of T3   and T8-T10 vertebral bodies are stable since 12/31/2020. Chronic left   scapular deformity. Chronic left clavicular deformity partially imaged.   Left ulna fixation hardware incidentally noted. Few well marginated   subcentimeter sclerotic foci of the proximal right femur may represent   bone islands.    IMPRESSION:    Noncontrast study.    CHEST:  1. Few groundglass opacities and clustered/tree-in-bud nodularity of the   right upper lobe, and to a lesser extentthe right middle lobe, may be   infectious or inflammatory.  2. Coronary artery calcifications versus stenting. Correlate with   procedural history.    ABDOMEN/PELVIS:  1. Pan-proctocolitis. Large fecal load of rectum.  2. Redemonstrated 6.4 x 6.2 cminfrarenal abdominal aortic aneurysm,   previously 6.1 x 6.1 cm on 12/31/2020.      ASSESSMENT :     Gastrointestinal hemorrhage    Yes    No pertinent past medical history    Dementia    Seizure    Anemia    Depression    HTN (hypertension)    Osteoarthritis    Osteoporosis    Diabetes mellitus    Seizure    T2DM (type 2 diabetes mellitus)    Seizures    Vitamin D deficiency    HTN (hypertension)    History of atherosclerosis    PVD (peripheral vascular disease)    Vascular dementia    Chronic obstructive pulmonary disease, unspecified COPD type    DM (diabetes mellitus)    No significant past surgical history        PLAN:  HPI:  Patient is a 85 y/o male, baseline AA0x1 and minimally ambulatory, with significant medical history of HTN, T2DM, PVOD, COPD, AAA (5.2cm), Tuberculosis, Seizure disorder, Anxiety, Vascular Dementia, Prior SAH/SDH, Iron Deficiency Anemia, Hypothyroidism, and Urinary Incontinence presented from [facility] with complaints of melena. The patient was very altered while in the ED and unable to provide any history or ROS; collateral history was obtained from facility records and Sister (Mrs. Candelaria Torres), and niece (Mrs. Mercy Blankenship). They reported that they had just found out about the melena today, and that he has no prior history of GIB in the past. As per med rec from facility, patient is not on ASA, or any blood thinners. Patient  was noted to be hypotensive to 60/38, , temp 98.7, and saturating 96% on RA. Initial labs showed a lactate of 6.1, WBC of 57, and Hb of 12.7; Pt was transfused 2U PRBC in and given 3L isotonic fluids upon which his BP improved. ICU was consulted initially for septic shock +/- acute GIB but as patient was fluid responsive and regained some mental status, he was found to be stable enough to go to general floor for further workup.  (31 Mar 2022 19:25)    # PROGNOSIS IS GUARDED, CASE DISCUSSED AT LENGTH WITH SISTER NORMAL TORRES AND NIECE MISS [facility] [employer] @   [phone] - ALL QUESTIONS ANSWERED    # HYPOTENSION SUSPECTED SEPSIS S/T PNEUMONIA AND COLITIS + HYPOVOLEMIA  # ?MELENA, UNDERLYING NALLELY - ACUTE ON CHRONIC ANEMIA  # CONSTIPATION, OBSTIPATION  - PLACED ON BROADSPECTRUM ABX - CEFEPIME, F/U BCX AND UCX  - REVIEWED CT CHEST/ABD/PELVIS  - C. DIFF NEGATIVE  - PLACED ON ISOLATION  - PLACED ON PPI DRIP, ADVANCING DIET AS TOLERATED, IVF  - S/P PRBC TRANSFUSION, TRANSFUSION THRESHOLD HGB < 7  - S/P CRITICAL CARE CONSULT, GASTROENTEROLOGY CONSULT, ID CONSULT    # LEUKOCYTOSIS - R/O LEUKEMIA VS. LEUKEMOID REACTION - IMPROVING  - HEME/ONC CONSULT IN PROGRESS    # ARCHANA - IMPROVING - PLACED ON IVF, REVIEWED UA, MONITOR CR, AVOID NEPHROTOXIC AGENTS    # ELEVATED LACTIC ACID - TRENDING LACTIC ACID    # HYPOKALEMIA - REPLETING WITH SUPPLEMENT    # HYPERCALCEMIA - SUSPECT S/T DEHYDRATION - F/U IONIZED CA, F/U PTH, F/U PTHRP, F/U VIT D    # ACUTE ENCEPHALOPATHY ON ADVANCED VASCULAR DEMENTIA, HX OF SAH/SDH - PATIENT IMPROVED TO ? BASELINE AFTER IVF AND PRBC    # AAA - VASCULAR SX CONSULT PLACED    # PRESSURE ULCER - WOUND CARE, PATIENT IS HIGH RISK FOR PRESSURE ULCERS DESPITE PREVENTIVE MEASURES IN PLACE    # SUSPECT SEVERE PROTEIN CALORIE NUTRITION - NUTRITIONAL SUPPLEMENT    # URINARY INCONTINENCE    # HTN  # T2DM  # PVOD  # COPD    # HX OF TB  # SEIZURE DISORDER  # HYPOTHYROIDISM  # GI PPX

## 2022-04-04 ENCOUNTER — TRANSCRIPTION ENCOUNTER (OUTPATIENT)
Age: 87
End: 2022-04-04

## 2022-04-04 DIAGNOSIS — Z02.9 ENCOUNTER FOR ADMINISTRATIVE EXAMINATIONS, UNSPECIFIED: ICD-10-CM

## 2022-04-04 LAB
ANION GAP SERPL CALC-SCNC: 6 MMOL/L — SIGNIFICANT CHANGE UP (ref 5–17)
BUN SERPL-MCNC: 9 MG/DL — SIGNIFICANT CHANGE UP (ref 7–18)
CALCIUM SERPL-MCNC: 8.8 MG/DL — SIGNIFICANT CHANGE UP (ref 8.4–10.5)
CHLORIDE SERPL-SCNC: 105 MMOL/L — SIGNIFICANT CHANGE UP (ref 96–108)
CO2 SERPL-SCNC: 26 MMOL/L — SIGNIFICANT CHANGE UP (ref 22–31)
CREAT SERPL-MCNC: 0.56 MG/DL — SIGNIFICANT CHANGE UP (ref 0.5–1.3)
EGFR: 97 ML/MIN/1.73M2 — SIGNIFICANT CHANGE UP
GLUCOSE BLDC GLUCOMTR-MCNC: 100 MG/DL — HIGH (ref 70–99)
GLUCOSE BLDC GLUCOMTR-MCNC: 100 MG/DL — HIGH (ref 70–99)
GLUCOSE BLDC GLUCOMTR-MCNC: 101 MG/DL — HIGH (ref 70–99)
GLUCOSE BLDC GLUCOMTR-MCNC: 106 MG/DL — HIGH (ref 70–99)
GLUCOSE BLDC GLUCOMTR-MCNC: 92 MG/DL — SIGNIFICANT CHANGE UP (ref 70–99)
GLUCOSE SERPL-MCNC: 118 MG/DL — HIGH (ref 70–99)
HCT VFR BLD CALC: 37.2 % — LOW (ref 39–50)
HGB BLD-MCNC: 12.1 G/DL — LOW (ref 13–17)
MCHC RBC-ENTMCNC: 32.2 PG — SIGNIFICANT CHANGE UP (ref 27–34)
MCHC RBC-ENTMCNC: 32.5 GM/DL — SIGNIFICANT CHANGE UP (ref 32–36)
MCV RBC AUTO: 98.9 FL — SIGNIFICANT CHANGE UP (ref 80–100)
NRBC # BLD: 0 /100 WBCS — SIGNIFICANT CHANGE UP (ref 0–0)
PLATELET # BLD AUTO: 160 K/UL — SIGNIFICANT CHANGE UP (ref 150–400)
POTASSIUM SERPL-MCNC: 3.7 MMOL/L — SIGNIFICANT CHANGE UP (ref 3.5–5.3)
POTASSIUM SERPL-SCNC: 3.7 MMOL/L — SIGNIFICANT CHANGE UP (ref 3.5–5.3)
RBC # BLD: 3.76 M/UL — LOW (ref 4.2–5.8)
RBC # FLD: 17.1 % — HIGH (ref 10.3–14.5)
SODIUM SERPL-SCNC: 137 MMOL/L — SIGNIFICANT CHANGE UP (ref 135–145)
WBC # BLD: 7.75 K/UL — SIGNIFICANT CHANGE UP (ref 3.8–10.5)
WBC # FLD AUTO: 7.75 K/UL — SIGNIFICANT CHANGE UP (ref 3.8–10.5)

## 2022-04-04 PROCEDURE — 99232 SBSQ HOSP IP/OBS MODERATE 35: CPT

## 2022-04-04 PROCEDURE — 74174 CTA ABD&PLVS W/CONTRAST: CPT | Mod: 26

## 2022-04-04 PROCEDURE — 99231 SBSQ HOSP IP/OBS SF/LOW 25: CPT

## 2022-04-04 RX ORDER — SOD SULF/SODIUM/NAHCO3/KCL/PEG
4000 SOLUTION, RECONSTITUTED, ORAL ORAL ONCE
Refills: 0 | Status: COMPLETED | OUTPATIENT
Start: 2022-04-04 | End: 2022-04-06

## 2022-04-04 RX ADMIN — POLYETHYLENE GLYCOL 3350 17 GRAM(S): 17 POWDER, FOR SOLUTION ORAL at 05:16

## 2022-04-04 RX ADMIN — CEFEPIME 100 MILLIGRAM(S): 1 INJECTION, POWDER, FOR SOLUTION INTRAMUSCULAR; INTRAVENOUS at 05:16

## 2022-04-04 RX ADMIN — LEVETIRACETAM 420 MILLIGRAM(S): 250 TABLET, FILM COATED ORAL at 18:49

## 2022-04-04 RX ADMIN — POLYETHYLENE GLYCOL 3350 17 GRAM(S): 17 POWDER, FOR SOLUTION ORAL at 18:49

## 2022-04-04 RX ADMIN — SENNA PLUS 2 TABLET(S): 8.6 TABLET ORAL at 21:40

## 2022-04-04 RX ADMIN — CEFEPIME 100 MILLIGRAM(S): 1 INJECTION, POWDER, FOR SOLUTION INTRAMUSCULAR; INTRAVENOUS at 18:49

## 2022-04-04 RX ADMIN — LEVETIRACETAM 420 MILLIGRAM(S): 250 TABLET, FILM COATED ORAL at 06:02

## 2022-04-04 RX ADMIN — CHLORHEXIDINE GLUCONATE 1 APPLICATION(S): 213 SOLUTION TOPICAL at 05:16

## 2022-04-04 RX ADMIN — PANTOPRAZOLE SODIUM 40 MILLIGRAM(S): 20 TABLET, DELAYED RELEASE ORAL at 12:26

## 2022-04-04 RX ADMIN — MUPIROCIN 1 APPLICATION(S): 20 OINTMENT TOPICAL at 05:16

## 2022-04-04 RX ADMIN — MUPIROCIN 1 APPLICATION(S): 20 OINTMENT TOPICAL at 18:49

## 2022-04-04 NOTE — PROGRESS NOTE ADULT - ASSESSMENT
1. Iron deficiency anemia  2. Melena stopped  3. No evidence of acute GI bleeding  4. Sepsis  5. Colitis  6. Hypokalemia    Suggestions:    1. Monitor H/H  2.Transfuse PRBC as needed  3. Protonix daily  4. Replace K+ as needed  5. ID Follow up  6. Antibiotics as per ID  7. Avoid NSAID  8. DVT prophylaxis

## 2022-04-04 NOTE — DISCHARGE NOTE PROVIDER - CARE PROVIDER_API CALL
Jorge Crooks)  Medicine  [id number] [place] [address]  Phone: [phone]  Fax: [phone]  Follow Up Time:

## 2022-04-04 NOTE — PROGRESS NOTE ADULT - PROBLEM SELECTOR PLAN 11
patient from [facility]   pending family convo with vascular after repeat imaging   COVID - 3/31   will repeat STAT today

## 2022-04-04 NOTE — PROGRESS NOTE ADULT - SUBJECTIVE AND OBJECTIVE BOX
NP Note discussed with  Primary Attending    INTERVAL HPI/OVERNIGHT EVENTS: successful BM this afternoon    MEDICATIONS  (STANDING):  cefepime   IVPB      cefepime   IVPB 1000 milliGRAM(s) IV Intermittent every 12 hours  chlorhexidine 2% Cloths 1 Application(s) Topical <User Schedule>  dextrose 5% + sodium chloride 0.9%. 1000 milliLiter(s) (75 mL/Hr) IV Continuous <Continuous>  dextrose 50% Injectable 25 Gram(s) IV Push once  glucagon  Injectable 1 milliGRAM(s) IntraMuscular once  insulin lispro (ADMELOG) corrective regimen sliding scale   SubCutaneous every 6 hours  levETIRAcetam  IVPB 500 milliGRAM(s) IV Intermittent every 12 hours  mupirocin 2% Ointment 1 Application(s) Both Nostrils two times a day  pantoprazole  Injectable 40 milliGRAM(s) IV Push daily  polyethylene glycol 3350 17 Gram(s) Oral two times a day  polyethylene glycol/electrolyte Solution. 4000 milliLiter(s) Oral once  senna 2 Tablet(s) Oral at bedtime    MEDICATIONS  (PRN):  acetaminophen     Tablet .. 650 milliGRAM(s) Oral every 6 hours PRN Temp greater or equal to 38C (100.4F), Mild Pain (1 - 3)  ALBUTerol    90 MICROgram(s) HFA Inhaler 2 Puff(s) Inhalation every 6 hours PRN Shortness of Breath and/or Wheezing  aluminum hydroxide/magnesium hydroxide/simethicone Suspension 30 milliLiter(s) Oral every 4 hours PRN Dyspepsia  melatonin 3 milliGRAM(s) Oral at bedtime PRN Insomnia  ondansetron Injectable 4 milliGRAM(s) IV Push every 8 hours PRN Nausea and/or Vomiting      __________________________________________________  REVIEW OF SYSTEMS:  limited due to mental status     Vital Signs Last 24 Hrs  T(C): 36.4 (04 Apr 2022 12:26), Max: 37.1 (03 Apr 2022 20:19)  T(F): 97.6 (04 Apr 2022 12:26), Max: 98.8 (03 Apr 2022 20:19)  HR: 77 (04 Apr 2022 12:26) (77 - 78)  BP: 128/70 (04 Apr 2022 12:26) (124/63 - 128/70)  BP(mean): --  RR: 18 (04 Apr 2022 12:26) (18 - 19)  SpO2: 96% (04 Apr 2022 12:26) (96% - 98%)    ________________________________________________  PHYSICAL EXAM:  GENERAL: cachectic, temporal wasting, NAD  HEAD:  Atraumatic, Normocephalic  EYES: EOMI, PERRLA, conjunctiva and sclera clear  ENMT: No tonsillar erythema, exudates, or enlargement; Moist mucous membranes, Good dentition, No lesions  NECK: Supple, No JVD, Normal thyroid  NERVOUS SYSTEM:  Awake/Alert . Follows few simple commands. Moves all extremities. No new neuro deficits.   CHEST/LUNG: Clear upper airway, decreased to bases . No rales, rhonchi, wheezing, or rubs  HEART: Regular rate and rhythm; No murmurs, rubs, or gallops  ABDOMEN: Soft, Nontender, Nondistended; Bowel sounds present. No pulsation noted.   EXTREMITIES:  Muscle wasting. 2+ Peripheral Pulses, No clubbing, cyanosis, or edema  LYMPH: No lymphadenopathy noted  SKIN: Stage 1 Pressure Injury to the Coccyx and Bilateral Heels  _________________________________________________  LABS:                        12.1   7.75  )-----------( 160      ( 04 Apr 2022 07:19 )             37.2     04-04    137  |  105  |  9   ----------------------------<  118<H>  3.7   |  26  |  0.56    Ca    8.8      04 Apr 2022 07:19          CAPILLARY BLOOD GLUCOSE      POCT Blood Glucose.: 101 mg/dL (04 Apr 2022 11:25)  POCT Blood Glucose.: 100 mg/dL (04 Apr 2022 06:47)  POCT Blood Glucose.: 92 mg/dL (04 Apr 2022 00:05)  POCT Blood Glucose.: 106 mg/dL (03 Apr 2022 21:02)  POCT Blood Glucose.: 88 mg/dL (03 Apr 2022 16:56)        RADIOLOGY & ADDITIONAL TESTS:     < from: CT Abdomen and Pelvis No Cont (03.31.22 @ 21:03) >    IMPRESSION:    Noncontrast study.    CHEST:  1. Few groundglass opacities and clustered/tree-in-bud nodularity of the   right upper lobe, and to a lesser extentthe right middle lobe, may be   infectious or inflammatory.  2. Coronary artery calcifications versus stenting. Correlate with   procedural history.    ABDOMEN/PELVIS:  1. Pan-proctocolitis. Large fecal load of rectum.  2. Redemonstrated 6.4 x 6.2 cminfrarenal abdominal aortic aneurysm,   previously 6.1 x 6.1 cm on 12/31/2020.    < end of copied text >  < from: Xray Chest 1 View- PORTABLE-Urgent (03.31.22 @ 15:41) >    IMPRESSION: Small left lung granuloma and probable dehydration again   noted.    < end of copied text >      Imaging Personally Reviewed:  YES    Consultant(s) Notes Reviewed:   YES    Plan of care was discussed with patient and /or primary care giver; all questions and concerns were addressed and care was aligned with patient's wishes.

## 2022-04-04 NOTE — PROGRESS NOTE ADULT - ASSESSMENT
85 y/o male, baseline AA0x1 from [facility] with PMHX of HTN, T2DM, PVOD, COPD, AAA (5.2cm), Tuberculosis, Seizure disorder, Anxiety, Vascular Dementia, Prior SAH/SDH, Iron Deficiency Anemia, Hypothyroidism, and Urinary Incontinence presented from [facility] with complaints of melena. Admitted for severe sepsis of unknown source also with "melena" requiring 2U PRBCs GI Patrice consulted. Started on Cefepime. BC/UC NGTD- ID sakshi follows. CT scan found Pan-proctocolitis. Large fecal load of rectum.and incidental findings of enlarging 6.4 x 6.2 cm infrarenal abdominal aortic aneurysm, vascular SX consulted and patient found not to be a surgical candidate.  Plan to re-image AAA and vascular to discuss options with family

## 2022-04-04 NOTE — PROGRESS NOTE ADULT - ASSESSMENT
86M, known hx of AAA, here with sepsis     -Medical management   -BP control   -Advise direct relatives to screen for aneurysms  -Remainder of care per primary team  -Please reconsult if GOC change

## 2022-04-04 NOTE — PROGRESS NOTE ADULT - PROBLEM SELECTOR PLAN 5
presented with melena  s/p 2 units PRBC   H&H >12  FOBT negative  No evidence of acute GIB  CT abd result as above shows pan-proctocolitis  Continue PPI daily  Avoid NSAIDs  Monitor CBC and transfuse for Hgb < 7  GI Dr. Ibrahim following

## 2022-04-04 NOTE — PROGRESS NOTE ADULT - SUBJECTIVE AND OBJECTIVE BOX
[   ] ICU                                          [   ] CCU                                      [ x  ] Medical Floor    Patient is a 86 year old male with anemia and melena. GI consulted to evaluate.        Patient is a 86 year old male, baseline AA0x1 and minimally ambulatory, with past medical history significant for HTN, T2DM, PVOD, COPD, AAA (5.2cm), Tuberculosis, Seizure disorder, Anxiety, Vascular Dementia, Prior SAH/SDH, Iron Deficiency Anemia, Hypothyroidism, and Urinary Incontinence presented from [facility] with complaints of melena. The patient was very altered while in the ED and unable to provide any history or ROS; collateral history was obtained from facility records and Sister (Mrs. Candelaria Martin), and niece (Mrs. Viviana Moon). They reported that they had just found out about the melena today, and that he has no prior history of GIB in the past. As per med rec from facility, patient is not on ASA, or any blood thinners. Patient  was noted to be hypotensive to 60/38, , temp 98.7, and saturating 96% on RA. Initial labs showed a lactate of 6.1, WBC of 57, and Hb of 12.7; Pt was transfused 2U PRBC in and given 3L isotonic fluids upon which his BP improved. ICU was consulted initially for septic shock +/- acute GIB but as patient was fluid responsive and regained some mental status, he was found to be stable enough to go to general floor for further workup.      Patient appears comfortable. No new complaints reported, No abdominal pain, N/V, hematemesis, hematochezia, melena, fever, chills, chest pain, SOB, cough or diarrhea reported.      PAIN MANAGEMENT:  Pain Scale:                 0/10  Pain Location:      Prior Colonoscopy:  No prior colonoscopy    PAST MEDICAL HISTORY   Seizure disorder  Depression  HTN (hypertension)  Osteoarthritis  Osteoporosis  Diabetes mellitus  DM  Vitamin D deficiency  CAD  PVD    Vascular dementia  COPD       PAST SURGICAL HISTORY  No significant past surgical history        Allergies    No Known Allergies    Intolerances  None       SOCIAL HISTORY  Advanced Directives:       [  ] Full Code       [ X ] DNR  Marital Status:         [  ] M      [ X ] S      [  ] D       [  ] W  Children:       [ X ] Yes      [  ] No  Occupation:        [  ] Employed       [ X ] Unemployed       [  ] Retired  Diet:       [ X ] Regular       [  ] PEG feeding          [  ] NG tube feeding  Drug Use:           [ X ] No             [  ] Yes  Alcohol:           [ X ] No             [  ] Yes (socially)         [  ] Yes (chronic)  Tobacco:           [  ] Yes           [ X ] No      FAMILY HISTORY  [ X ] Heart Disease            [ X ] Diabetes             [ X ] HTN             [  ] Colon Cancer             [  ] Stomach Cancer              [  ] Pancreatic Cancer        VITALS  Vital Signs Last 24 Hrs  T(C): 36.4 (04 Apr 2022 05:00), Max: 37.1 (03 Apr 2022 20:19)  T(F): 97.6 (04 Apr 2022 05:00), Max: 98.8 (03 Apr 2022 20:19)  HR: 78 (04 Apr 2022 05:00) (74 - 78)  BP: 128/68 (04 Apr 2022 05:00) (124/63 - 133/78)  BP(mean): --  RR: 19 (04 Apr 2022 05:00) (18 - 19)  SpO2: 98% (04 Apr 2022 05:00) (95% - 98%)          MEDICATIONS  (STANDING):  cefepime   IVPB      cefepime   IVPB 1000 milliGRAM(s) IV Intermittent every 12 hours  chlorhexidine 2% Cloths 1 Application(s) Topical <User Schedule>  dextrose 5% + sodium chloride 0.9%. 1000 milliLiter(s) (75 mL/Hr) IV Continuous <Continuous>  dextrose 50% Injectable 25 Gram(s) IV Push once  glucagon  Injectable 1 milliGRAM(s) IntraMuscular once  insulin lispro (ADMELOG) corrective regimen sliding scale   SubCutaneous every 6 hours  levETIRAcetam  IVPB 500 milliGRAM(s) IV Intermittent every 12 hours  mupirocin 2% Ointment 1 Application(s) Both Nostrils two times a day  pantoprazole  Injectable 40 milliGRAM(s) IV Push daily  polyethylene glycol 3350 17 Gram(s) Oral two times a day  polyethylene glycol/electrolyte Solution. 4000 milliLiter(s) Oral once  senna 2 Tablet(s) Oral at bedtime    MEDICATIONS  (PRN):  acetaminophen     Tablet .. 650 milliGRAM(s) Oral every 6 hours PRN Temp greater or equal to 38C (100.4F), Mild Pain (1 - 3)  ALBUTerol    90 MICROgram(s) HFA Inhaler 2 Puff(s) Inhalation every 6 hours PRN Shortness of Breath and/or Wheezing  aluminum hydroxide/magnesium hydroxide/simethicone Suspension 30 milliLiter(s) Oral every 4 hours PRN Dyspepsia  melatonin 3 milliGRAM(s) Oral at bedtime PRN Insomnia  ondansetron Injectable 4 milliGRAM(s) IV Push every 8 hours PRN Nausea and/or Vomiting                            12.1   7.75  )-----------( 160      ( 04 Apr 2022 07:19 )             37.2       04-04    137  |  105  |  9   ----------------------------<  118<H>  3.7   |  26  |  0.56    Ca    8.8      04 Apr 2022 07:19

## 2022-04-04 NOTE — PROGRESS NOTE ADULT - ASSESSMENT
MICKY GARCIA is a 86y Male who presents with a chief complaint of hypotension. Hematology was consulted for leukocytosis.    Leukocytosis  - Patient on presentation with WBC of 57, mostly neutrophils. With ongoing treatment for sepsis leukocytosis has resolved.  - WBC today 7.75.  - Continue to monitor CBC.  - Cultures no growth to date. He remains on cefepime at this time.    Anemia  - Mild anemia in the setting of sepsis.   - Monitor hemoglobin. Transfuse to maintain hemoglobin goal of 7.    Will continue to follow.    Narinder Constantino MD  Hematology/Oncology  C: [phone]  O: [phone] MICKY GARCIA is a 86y Male who presents with a chief complaint of hypotension. Hematology was consulted for leukocytosis.    Leukocytosis  - Patient on presentation with WBC of 57, mostly neutrophils. With ongoing treatment for sepsis leukocytosis has resolved.  - WBC today 7.75.  - Continue to monitor CBC.  - Cultures no growth to date. He remains on cefepime at this time.    Anemia  - Mild anemia in the setting of sepsis.   - Monitor hemoglobin. Transfuse to maintain hemoglobin goal of 7.    Will continue to follow.    Narinder Constantino MD  Hematology/Oncology  O: [phone]

## 2022-04-04 NOTE — DISCHARGE NOTE PROVIDER - HOSPITAL COURSE
87 y/o male, baseline AA0x1 from [facility] with PMHX of HTN, T2DM, PVOD, COPD, AAA (5.2cm), Tuberculosis, Seizure disorder, Anxiety, Vascular Dementia, Prior SAH/SDH, Iron Deficiency Anemia, Hypothyroidism, and Urinary Incontinence presented from [facility] with complaints of melena. Admitted for severe sepsis of unknown source also with "melena" requiring 2U PRBCs GI Patrice consulted. Started on Cefepime. BC/UC NGTD- ID sakshi follows. CT scan found Pan-proctocolitis. Large fecal load of rectum.and incidental findings of enlarging 6.4 x 6.2 cm infrarenal abdominal aortic aneurysm, vascular SX consulted and patient found not to be a surgical candidate.  Plan to re-image AAA and vascular to discuss options with family     -*-*- *INCOMPLETE 85 y/o male, baseline AA0x1 from [facility] with PMHX of HTN, T2DM, PVOD, COPD, AAA (5.2cm), Tuberculosis, Seizure disorder, Anxiety, Vascular Dementia, Prior SAH/SDH, Iron Deficiency Anemia, Hypothyroidism, and Urinary Incontinence presented from [facility] with complaints of melena. Admitted for severe sepsis of unknown source also with "melena" requiring 2U PRBCs GI Julianyan consulted. Started on Cefepime IV complete on 4/7. BC/UC NGTD- ID sakshi follows. CT scan found Pan-proctocolitis. Large fecal load of rectum. and incidental findings of enlarging 6.4 x 6.2 cm infrarenal abdominal aortic aneurysm, vascular SX consulted and patient found to be poor surgical candidate. Palliative consulted and discussed with family, no further surgical intervention. abdominal x-ray performed resulting Mild colonic stool load.  Nonobstructive bowel gas pattern. Constipation treated with tap water enema and Golytely.     Please note that this a brief summary of hospital course please refer to daily progress notes and consult notes for full course and events. Patient seen and examined at bedside, discussed with medical attending. Patient medically cleared for discharge to [facility].

## 2022-04-04 NOTE — DISCHARGE NOTE PROVIDER - NSDCMRMEDTOKEN_GEN_ALL_CORE_FT
cyanocobalamin 1000 mcg oral tablet: 1 tab(s) orally once a day  ferrous sulfate 324 mg (65 mg elemental iron) oral delayed release tablet: 1 tab(s) orally once a day  folic acid 1 mg oral tablet: 1 tab(s) orally once a day  levETIRAcetam 500 mg oral tablet: 1 tab(s) orally 2 times a day  Multiple Vitamins oral tablet: 1 tab(s) orally once a day  Tylenol 325 mg oral tablet: 2 tab(s) orally every 4 hours, As Needed  Vitamin D3 25 mcg (1000 intl units) oral tablet, chewable: 1 tab(s) orally once a day   acetaminophen 325 mg oral tablet: 2 tab(s) orally every 6 hours, As needed, Temp greater or equal to 38C (100.4F), Mild Pain (1 - 3)  albuterol 90 mcg/inh inhalation aerosol: 2 puff(s) inhaled every 6 hours, As needed, Shortness of Breath and/or Wheezing  aluminum hydroxide-magnesium hydroxide 200 mg-200 mg/5 mL oral suspension: 30 milliliter(s) orally every 4 hours, As needed, Dyspepsia  cefepime: 1000 milligram(s) intravenous every 12 hours  cyanocobalamin 1000 mcg oral tablet: 1 tab(s) orally once a day  folic acid 1 mg oral tablet: 1 tab(s) orally once a day  insulin lispro 100 units/mL injectable solution:  injectable   The current order:        insulin lispro (ADMELOG) corrective regimen sliding scale-----      1 Unit(s) if Glucose 151 - 200      2 Unit(s) if Glucose 201 - 250      3 Unit(s) if Glucose 251 - 300      4 Unit(s) if Glucose 301 - 350      5 Unit(s) if Glucose 351 - 400      6 Unit(s) if Glucose Greater Than 400 + Contact MD  SubCutaneous, three times a day before meals    levETIRAcetam 100 mg/mL intravenous solution: 5 milliliter(s) intravenous every 12 hours  melatonin 3 mg oral tablet: 1 tab(s) orally once a day (at bedtime), As needed, Insomnia  Multiple Vitamins oral tablet: 1 tab(s) orally once a day  ondansetron 2 mg/mL injectable solution:  injectable   polyethylene glycol 3350 oral powder for reconstitution: 17 gram(s) orally 2 times a day  senna oral tablet: 2 tab(s) orally once a day (at bedtime)  Vitamin D3 25 mcg (1000 intl units) oral tablet, chewable: 1 tab(s) orally once a day   acetaminophen 325 mg oral tablet: 2 tab(s) orally every 6 hours, As needed, Temp greater or equal to 38C (100.4F), Mild Pain (1 - 3)  albuterol 90 mcg/inh inhalation aerosol: 2 puff(s) inhaled every 6 hours, As needed, Shortness of Breath and/or Wheezing  aluminum hydroxide-magnesium hydroxide 200 mg-200 mg/5 mL oral suspension: 30 milliliter(s) orally every 4 hours, As needed, Dyspepsia  cefepime: 1000 milligram(s) intravenous every 12 hours  cyanocobalamin 1000 mcg oral tablet: 1 tab(s) orally once a day  folic acid 1 mg oral tablet: 1 tab(s) orally once a day  insulin lispro 100 units/mL injectable solution: 1  injectable 3 times a day (before meals) -----      1 Unit(s) if Glucose 151 - 200      2 Unit(s) if Glucose 201 - 250      3 Unit(s) if Glucose 251 - 300      4 Unit(s) if Glucose 301 - 350      5 Unit(s) if Glucose 351 - 400      6 Unit(s) if Glucose Greater Than 400 + Contact MD  SubCutaneous, three times a day before meals    levETIRAcetam 100 mg/mL intravenous solution: 5 milliliter(s) intravenous every 12 hours  melatonin 3 mg oral tablet: 1 tab(s) orally once a day (at bedtime), As needed, Insomnia  Multiple Vitamins oral tablet: 1 tab(s) orally once a day  polyethylene glycol 3350 oral powder for reconstitution: 17 gram(s) orally 2 times a day  senna oral tablet: 2 tab(s) orally once a day (at bedtime)  Vitamin D3 25 mcg (1000 intl units) oral tablet, chewable: 1 tab(s) orally once a day

## 2022-04-04 NOTE — PROGRESS NOTE ADULT - SUBJECTIVE AND OBJECTIVE BOX
INTERVAL HPI/OVERNIGHT EVENTS:    Pt seen and examined at bedside. No acute events overnight, no acute complaints at this time.     Vital Signs Last 24 Hrs  T(C): 36.4 (04 Apr 2022 05:00), Max: 37.1 (03 Apr 2022 20:19)  T(F): 97.6 (04 Apr 2022 05:00), Max: 98.8 (03 Apr 2022 20:19)  HR: 78 (04 Apr 2022 05:00) (74 - 78)  BP: 128/68 (04 Apr 2022 05:00) (124/63 - 133/78)  BP(mean): --  RR: 19 (04 Apr 2022 05:00) (18 - 19)  SpO2: 98% (04 Apr 2022 05:00) (95% - 98%)  I&O's Detail    aluminum hydroxide/magnesium hydroxide/simethicone Suspension 30 milliLiter(s) Oral every 4 hours PRN  cefepime   IVPB      cefepime   IVPB 1000 milliGRAM(s) IV Intermittent every 12 hours  pantoprazole  Injectable 40 milliGRAM(s) IV Push daily  polyethylene glycol 3350 17 Gram(s) Oral two times a day  senna 2 Tablet(s) Oral at bedtime      Physical Exam  General: Alert, No acute distress  Abdomen: soft, nondistended, nontender, no rebound tenderness, no guarding, palpable enlarged aorta   Vasc: Palpable fem/ pop, DP pulses b/l   Extremities: Warm, no edema     Labs:                        12.1   7.75  )-----------( 160      ( 04 Apr 2022 07:19 )             37.2     04-03    139  |  106  |  9   ----------------------------<  96  2.9<LL>   |  28  |  0.52    Ca    8.9      03 Apr 2022 05:54

## 2022-04-04 NOTE — DISCHARGE NOTE PROVIDER - NSDCCPCAREPLAN_GEN_ALL_CORE_FT
PRINCIPAL DISCHARGE DIAGNOSIS  Diagnosis: Acute encephalopathy  Assessment and Plan of Treatment: You were brought to the hospital with an acute change in mental status. This was likely secondary to your colitis or pneumonia. This has been treated with antibiotics and this has now improved and you have returned to your baseline mental status      SECONDARY DISCHARGE DIAGNOSES  Diagnosis: Abdominal aortic aneurysm  Assessment and Plan of Treatment: You have a history of a abdominal aortic aneurysm, this was evaluated by CT scan and found to have enlarged to 6cm. You were followed by a vascular surgeon and decision was made not to persue surgical intervention -*-*-*-* iNCOMPLETE    Diagnosis: Proctocolitis  Assessment and Plan of Treatment: Infections that involve the rectum and the colon are termed proctocolitis. Symptoms may include those of proctitis as well as abdominal pain, bloating, cramping, and sometimes diarrhea and fever. This was treated with intravenous antibiotics, it is important to continue your bowel regimen and avoid constipation    Diagnosis: Melena  Assessment and Plan of Treatment: You presented to the hospital with blood in stool, although your hemoglobin remained stable your recieved 2 units of blood. You had no further episodes of bleeding. Monitor your bowel movements for blood, and follow up with your primary care doctor in 1 week to have your blood work rechecked     PRINCIPAL DISCHARGE DIAGNOSIS  Diagnosis: Acute encephalopathy  Assessment and Plan of Treatment: You were brought to the hospital with an acute change in mental status. This was likely secondary to your colitis or pneumonia. This has been treated with antibiotics and this has now improved and you have returned to your baseline mental status      SECONDARY DISCHARGE DIAGNOSES  Diagnosis: Abdominal aortic aneurysm  Assessment and Plan of Treatment: You have a history of a abdominal aortic aneurysm, this was evaluated by CT scan and found to have enlarged to 6cm. You were followed by a vascular surgeon and decision was made not to persue surgical intervention.  Please follow up with your Primary Care Physician and Vascular Physician for further medical management.    Diagnosis: Melena  Assessment and Plan of Treatment: You presented to the hospital with blood in stool, although your hemoglobin remained stable your recieved 2 units of blood. You had no further episodes of bleeding. Monitor your bowel movements for blood, and follow up with your primary care doctor in 1 week to have your blood work rechecked    Diagnosis: Proctocolitis  Assessment and Plan of Treatment: Infections that involve the rectum and the colon are termed proctocolitis. Symptoms may include those of proctitis as well as abdominal pain, bloating, cramping, and sometimes diarrhea and fever. This was treated with intravenous antibiotics, it is important to continue your bowel regimen and avoid constipation    Diagnosis: Constipation  Assessment and Plan of Treatment: Please continue taking your medication as prescribed. If you have any questions or concerns about your medication please direct them to your prescribing Healthcare Provider.

## 2022-04-04 NOTE — PROGRESS NOTE ADULT - SUBJECTIVE AND OBJECTIVE BOX
86y Male    Meds:  cefepime   IVPB      cefepime   IVPB 1000 milliGRAM(s) IV Intermittent every 12 hours    Allergies    No Known Allergies    Intolerances        VITALS:  Vital Signs Last 24 Hrs  T(C): 36.4 (04 Apr 2022 12:26), Max: 37.1 (03 Apr 2022 20:19)  T(F): 97.6 (04 Apr 2022 12:26), Max: 98.8 (03 Apr 2022 20:19)  HR: 77 (04 Apr 2022 12:26) (77 - 78)  BP: 128/70 (04 Apr 2022 12:26) (124/63 - 128/70)  BP(mean): --  RR: 18 (04 Apr 2022 12:26) (18 - 19)  SpO2: 96% (04 Apr 2022 12:26) (96% - 98%)    LABS/DIAGNOSTIC TESTS:                          12.1   7.75  )-----------( 160      ( 04 Apr 2022 07:19 )             37.2         04-04    137  |  105  |  9   ----------------------------<  118<H>  3.7   |  26  |  0.56    Ca    8.8      04 Apr 2022 07:19            CULTURES: Clean Catch Clean Catch (Midstream)  04-01 @ 02:07   >=3 organisms. Probable collection contamination.  --  --      .Blood Blood  03-31 @ 21:02   No growth to date.  --  --            RADIOLOGY:      ROS:  [  ] UNABLE TO ELICIT 86y Male who is looking well, he remains confused but is awake and alert, he has no fevers , he is not SOB or coughing in front of me. Non verbal. His WBC count has decreased nicely.    Meds:  cefepime   IVPB      cefepime   IVPB 1000 milliGRAM(s) IV Intermittent every 12 hours    Allergies    No Known Allergies    Intolerances        VITALS:  Vital Signs Last 24 Hrs  T(C): 36.4 (04 Apr 2022 12:26), Max: 37.1 (03 Apr 2022 20:19)  T(F): 97.6 (04 Apr 2022 12:26), Max: 98.8 (03 Apr 2022 20:19)  HR: 77 (04 Apr 2022 12:26) (77 - 78)  BP: 128/70 (04 Apr 2022 12:26) (124/63 - 128/70)  BP(mean): --  RR: 18 (04 Apr 2022 12:26) (18 - 19)  SpO2: 96% (04 Apr 2022 12:26) (96% - 98%)    LABS/DIAGNOSTIC TESTS:                          12.1   7.75  )-----------( 160      ( 04 Apr 2022 07:19 )             37.2         04-04    137  |  105  |  9   ----------------------------<  118<H>  3.7   |  26  |  0.56    Ca    8.8      04 Apr 2022 07:19            CULTURES: Clean Catch Clean Catch (Midstream)  04-01 @ 02:07   >=3 organisms. Probable collection contamination.  --  --      .Blood Blood  03-31 @ 21:02   No growth to date.  --  --            RADIOLOGY:      ROS:  [ x] UNABLE TO ELICIT

## 2022-04-04 NOTE — PROGRESS NOTE ADULT - SUBJECTIVE AND OBJECTIVE BOX
CHIEF COMPLAINT  Hypotension    HISTORY OF PRESENT ILLNESS  MICKY GARCIA is a 86y Male who presents with a chief complaint of hypotension.    No acute events. No complaints.    REVIEW OF SYSTEMS  A complete review of systems was performed; negative except per HPI    PHYSICAL EXAM  T(C): 36.4 (04-04-22 @ 05:00), Max: 37.1 (04-03-22 @ 20:19)  HR: 78 (04-04-22 @ 05:00) (74 - 78)  BP: 128/68 (04-04-22 @ 05:00) (124/63 - 133/78)  RR: 19 (04-04-22 @ 05:00) (18 - 19)  SpO2: 98% (04-04-22 @ 05:00) (95% - 98%)  Constitutional: awake, in no acute distress  Eyes: PERRL, EOMI  HEENT: normocephalic, atraumatic  Neck: supple, non-tender  Cardiovascular: normal perfusion, no peripheral edema  Respiratory: normal respiratory efforts; no increased use of accessory muscles  Gastrointestinal: soft, non-tender  Musculoskeletal: normal range of motion, no deformities noted  Skin: warm, dry    LABORATORY DATA                        12.1   7.75  )-----------( 160      ( 04 Apr 2022 07:19 )             37.2     04-04    137  |  105  |  9   ----------------------------<  118<H>  3.7   |  26  |  0.56    Ca    8.8      04 Apr 2022 07:19

## 2022-04-04 NOTE — PROGRESS NOTE ADULT - ASSESSMENT
Pneumonia  Leukocytosis - normalized      Plan - Cont Maxipime 1 gm iv q12hrs needs till 4/7/22  if ready for discharge and going to a NH can complete therapy there via a peripheral IV.

## 2022-04-04 NOTE — PROGRESS NOTE ADULT - SUBJECTIVE AND OBJECTIVE BOX
Patient is a 86y old  Male who presents with a chief complaint of Encephalopathy, Hypotension (04 Apr 2022 09:04)    PATIENT IS SEEN AND EXAMINED IN MEDICAL FLOOR.  NGT [    ]    BLANCA [   ]      GT [   ]    ALLERGIES:  No Known Allergies      Daily     Daily     VITALS:    Vital Signs Last 24 Hrs  T(C): 36.4 (04 Apr 2022 05:00), Max: 37.1 (03 Apr 2022 20:19)  T(F): 97.6 (04 Apr 2022 05:00), Max: 98.8 (03 Apr 2022 20:19)  HR: 78 (04 Apr 2022 05:00) (74 - 78)  BP: 128/68 (04 Apr 2022 05:00) (124/63 - 133/78)  BP(mean): --  RR: 19 (04 Apr 2022 05:00) (18 - 19)  SpO2: 98% (04 Apr 2022 05:00) (95% - 98%)    LABS:    CBC Full  -  ( 04 Apr 2022 07:19 )  WBC Count : 7.75 K/uL  RBC Count : 3.76 M/uL  Hemoglobin : 12.1 g/dL  Hematocrit : 37.2 %  Platelet Count - Automated : 160 K/uL  Mean Cell Volume : 98.9 fl  Mean Cell Hemoglobin : 32.2 pg  Mean Cell Hemoglobin Concentration : 32.5 gm/dL  Auto Neutrophil # : x  Auto Lymphocyte # : x  Auto Monocyte # : x  Auto Eosinophil # : x  Auto Basophil # : x  Auto Neutrophil % : x  Auto Lymphocyte % : x  Auto Monocyte % : x  Auto Eosinophil % : x  Auto Basophil % : x      04-04    137  |  105  |  9   ----------------------------<  118<H>  3.7   |  26  |  0.56    Ca    8.8      04 Apr 2022 07:19      CAPILLARY BLOOD GLUCOSE      POCT Blood Glucose.: 100 mg/dL (04 Apr 2022 06:47)  POCT Blood Glucose.: 92 mg/dL (04 Apr 2022 00:05)  POCT Blood Glucose.: 106 mg/dL (03 Apr 2022 21:02)  POCT Blood Glucose.: 88 mg/dL (03 Apr 2022 16:56)  POCT Blood Glucose.: 102 mg/dL (03 Apr 2022 12:09)          Creatinine Trend: 0.56<--, 0.52<--, 0.78<--, 1.12<--, 1.19<--, 1.32<--  I&O's Summary          Clean Catch Clean Catch (Midstream)  04-01 @ 02:07   >=3 organisms. Probable collection contamination.  --  --      .Blood Blood  03-31 @ 21:02   No growth to date.  --  --          MEDICATIONS:    MEDICATIONS  (STANDING):  cefepime   IVPB      cefepime   IVPB 1000 milliGRAM(s) IV Intermittent every 12 hours  chlorhexidine 2% Cloths 1 Application(s) Topical <User Schedule>  dextrose 5% + sodium chloride 0.9%. 1000 milliLiter(s) (75 mL/Hr) IV Continuous <Continuous>  dextrose 50% Injectable 25 Gram(s) IV Push once  glucagon  Injectable 1 milliGRAM(s) IntraMuscular once  insulin lispro (ADMELOG) corrective regimen sliding scale   SubCutaneous every 6 hours  levETIRAcetam  IVPB 500 milliGRAM(s) IV Intermittent every 12 hours  mupirocin 2% Ointment 1 Application(s) Both Nostrils two times a day  pantoprazole  Injectable 40 milliGRAM(s) IV Push daily  polyethylene glycol 3350 17 Gram(s) Oral two times a day  senna 2 Tablet(s) Oral at bedtime      MEDICATIONS  (PRN):  acetaminophen     Tablet .. 650 milliGRAM(s) Oral every 6 hours PRN Temp greater or equal to 38C (100.4F), Mild Pain (1 - 3)  ALBUTerol    90 MICROgram(s) HFA Inhaler 2 Puff(s) Inhalation every 6 hours PRN Shortness of Breath and/or Wheezing  aluminum hydroxide/magnesium hydroxide/simethicone Suspension 30 milliLiter(s) Oral every 4 hours PRN Dyspepsia  melatonin 3 milliGRAM(s) Oral at bedtime PRN Insomnia  ondansetron Injectable 4 milliGRAM(s) IV Push every 8 hours PRN Nausea and/or Vomiting      REVIEW OF SYSTEMS:                           ALL ROS DONE [ X   ]    CONSTITUTIONAL:  LETHARGIC [   ], FEVER [   ], UNRESPONSIVE [   ]  CVS:  CP  [   ], SOB, [   ], PALPITATIONS [   ], DIZZYNESS [   ]  RS: COUGH [   ], SPUTUM [   ]  GI: ABDOMINAL PAIN [   ], NAUSEA [   ], VOMITINGS [   ], DIARRHEA [   ], CONSTIPATION [   ]  :  DYSURIA [   ], NOCTURIA [   ], INCREASED FREQUENCY [   ], DRIBLING [   ],  SKELETAL: PAINFUL JOINTS [   ], SWOLLEN JOINTS [   ], NECK ACHE [   ], LOW BACK ACHE [   ],  SKIN : ULCERS [   ], RASH [   ], ITCHING [   ]  CNS: HEAD ACHE [   ], DOUBLE VISION [   ], BLURRED VISION [   ], AMS / CONFUSION [   ], SEIZURES [   ], WEAKNESS [   ],TINGLING / NUMBNESS [   ]    PHYSICAL EXAMINATION:  GENERAL APPEARANCE: NO DISTRESS  HEENT:  NO PALLOR, NO  JVD,  NO   NODES, NECK SUPPLE  CVS: S1 +, S2 +,   RS: AEEB,  OCCASIONAL  RALES +,   NO RONCHI  ABD: SOFT, NT, NO, BS +  EXT: NO PE  SKIN: WARM,  PRESSURE ULCER+  SKELETAL:  ROM ACCEPTABLE  CNS:  AAO X 1    RADIOLOGY :    ACC: [id number] EXAM:  CT ABDOMEN AND PELVIS                        ACC: [id number] EXAM:  CT CHEST                          PROCEDURE DATE:  03/31/2022          INTERPRETATION:  CLINICAL INFORMATION: Sepsis of unknown source.    COMPARISON: CT chest abdomen 12/31/2020    CONTRAST/COMPLICATIONS:  IV Contrast: NONE  Oral Contrast: NONE  Complications: None reported at time of study completion    PROCEDURE:  CT of the Chest, Abdomen and Pelvis was performed.  Sagittal and coronal reformats were performed.    FINDINGS:    CHEST:  LUNGS AND LARGE AIRWAYS: Trace central airway secretions. Prominent   biapical pleuroparenchymal scarring. Few groundglass opacities and   clustered tree-in-bud nodularity of the right upper lobe and to a lesser   extent,the right middle lobe. Findings may be infectious or   inflammatory. 4 mm right middle lobe nodule, image 111 series 2, stable   since 12/31/2020. Few additional calcified granulomas noted.  PLEURA: No pleural effusion or pneumothorax.  VESSELS: Atheromatous change of the thoracic aorta which is borderline   aneurysmal at the proximal segment, 3.1 cm and mildly aneurysmal at the   distal segment, 3.3 cm. Main pulmonary artery size is within normal limits  HEART: Heart size is qualitatively withinnormal limits. There is   coronary artery calcification and/or stenting. Correlate with procedural   history. Trace pericardial fluid.  MEDIASTINUM AND FUAD: Small volume nodes.  CHEST WALL AND LOWER NECK: No chest wall hematoma. Chronic left scapular   deformity.    ABDOMEN AND PELVIS:    Solid organ evaluation is limited due to noncontrast technique.    LIVER: Liver size within normal limits  BILE DUCTS: No biliary distention  GALLBLADDER: Nonspecific gallbladder distention. Correlate with LFTs.  SPLEEN: Normal size  PANCREAS: Atrophy of the pancreatic parenchyma. No main ductal dilatation  ADRENALS: Adrenal gland thickening, nonspecific  KIDNEYS/URETERS: No hydronephrosis. Bilateral renal hypodensities   suboptimally characterized in the absence of IV contrast.    BLADDER: Underdistended.  REPRODUCTIVE ORGANS: Prostate size within normal limits.    BOWEL: Limited evaluation the absence of oral contrast. Stomach is   underdistended. No small bowel distention. The appendix is noninflamed.   There is diffuse long segment colonic wall inflammation and large fecal   load of the rectum.  PERITONEUM: Trace fluid.  VESSELS: 6.4 x 6.2 cm infrarenal abdominal aortic aneurysm, previously   6.1 x 6.1 cm on 12/31/2020. Few apparent displaced intimal calcifications   associated with the aneurysm are new since prior CT from 12/31/2020,   possibly related to organizing peripheral mural thrombus. Borderline   aneurysmal dilatation/ectasia of the common iliac arteries as well, 1.7   cm on the right and 1.5 cm and the left, stable.  RETROPERITONEUM/LYMPH NODES: Small volume nodes.  ABDOMINAL WALL: Tiny fat-containing umbilical hernia.  BONES: Degenerative changes and osseous demineralization. L5-S1 pars   interarticularis defects redemonstrated. Compression deformities of T3   and T8-T10 vertebral bodies are stable since 12/31/2020. Chronic left   scapular deformity. Chronic left clavicular deformity partially imaged.   Left ulna fixation hardware incidentally noted. Few well marginated   subcentimeter sclerotic foci of the proximal right femur may represent   bone islands.    IMPRESSION:    Noncontrast study.    CHEST:  1. Few groundglass opacities and clustered/tree-in-bud nodularity of the   right upper lobe, and to a lesser extentthe right middle lobe, may be   infectious or inflammatory.  2. Coronary artery calcifications versus stenting. Correlate with   procedural history.    ABDOMEN/PELVIS:  1. Pan-proctocolitis. Large fecal load of rectum.  2. Redemonstrated 6.4 x 6.2 cminfrarenal abdominal aortic aneurysm,   previously 6.1 x 6.1 cm on 12/31/2020.      ASSESSMENT :     Gastrointestinal hemorrhage    Yes    No pertinent past medical history    Dementia    Seizure    Anemia    Depression    HTN (hypertension)    Osteoarthritis    Osteoporosis    Diabetes mellitus    Seizure    T2DM (type 2 diabetes mellitus)    Seizures    Vitamin D deficiency    HTN (hypertension)    History of atherosclerosis    PVD (peripheral vascular disease)    Vascular dementia    Chronic obstructive pulmonary disease, unspecified COPD type    DM (diabetes mellitus)    No significant past surgical history        PLAN:  HPI:  Patient is a 85 y/o male, baseline AA0x1 and minimally ambulatory, with significant medical history of HTN, T2DM, PVOD, COPD, AAA (5.2cm), Tuberculosis, Seizure disorder, Anxiety, Vascular Dementia, Prior SAH/SDH, Iron Deficiency Anemia, Hypothyroidism, and Urinary Incontinence presented from [facility] with complaints of melena. The patient was very altered while in the ED and unable to provide any history or ROS; collateral history was obtained from facility records and Sister (Mrs. Candelaria Martin), and niece (Mrs. Mercy Moon). They reported that they had just found out about the melena today, and that he has no prior history of GIB in the past. As per med rec from facility, patient is not on ASA, or any blood thinners. Patient  was noted to be hypotensive to 60/38, , temp 98.7, and saturating 96% on RA. Initial labs showed a lactate of 6.1, WBC of 57, and Hb of 12.7; Pt was transfused 2U PRBC in and given 3L isotonic fluids upon which his BP improved. ICU was consulted initially for septic shock +/- acute GIB but as patient was fluid responsive and regained some mental status, he was found to be stable enough to go to general floor for further workup.  (31 Mar 2022 19:25)    # PROGNOSIS IS GUARDED, CASE DISCUSSED AT LENGTH WITH SISTER NORMAL MARTIN AND NIECE MISS MERCY MOON @   [phone] - ALL QUESTIONS ANSWERED    # HYPOTENSION SUSPECTED SEPSIS S/T PNEUMONIA AND COLITIS + HYPOVOLEMIA  # ?MELENA, UNDERLYING NALLELY - ACUTE ON CHRONIC ANEMIA  # CONSTIPATION, OBSTIPATION  - PLACED ON BROADSPECTRUM ABX - CEFEPIME, F/U BCX AND UCX  - REVIEWED CT CHEST/ABD/PELVIS  - C. DIFF NEGATIVE  - PLACED ON ISOLATION  - PLACED ON PPI DRIP, ADVANCING DIET AS TOLERATED, IVF  - S/P PRBC TRANSFUSION, TRANSFUSION THRESHOLD HGB < 7  - S/P CRITICAL CARE CONSULT, GASTROENTEROLOGY CONSULT, ID CONSULT    # LEUKOCYTOSIS - R/O LEUKEMIA VS. LEUKEMOID REACTION - IMPROVING  - HEME/ONC CONSULT IN PROGRESS    # ARCHANA - IMPROVING - PLACED ON IVF, REVIEWED UA, MONITOR CR, AVOID NEPHROTOXIC AGENTS    # ELEVATED LACTIC ACID - TRENDING LACTIC ACID    # HYPOKALEMIA - REPLETING WITH SUPPLEMENT    # HYPERCALCEMIA - SUSPECT S/T DEHYDRATION - F/U IONIZED CA, F/U PTH, F/U PTHRP, F/U VIT D    # ACUTE ENCEPHALOPATHY ON ADVANCED VASCULAR DEMENTIA, HX OF SAH/SDH - PATIENT IMPROVED TO ? BASELINE AFTER IVF AND PRBC    # AAA - VASCULAR SX CONSULT PLACED    # PRESSURE ULCER - WOUND CARE, PATIENT IS HIGH RISK FOR PRESSURE ULCERS DESPITE PREVENTIVE MEASURES IN PLACE    # SUSPECT SEVERE PROTEIN CALORIE NUTRITION - NUTRITIONAL SUPPLEMENT    # URINARY INCONTINENCE    # HTN  # T2DM  # PVOD  # COPD    # HX OF TB  # SEIZURE DISORDER  # HYPOTHYROIDISM  # GI PPX   Patient is a 86y old  Male who presents with a chief complaint of Encephalopathy, Hypotension (04 Apr 2022 09:04)    PATIENT IS SEEN AND EXAMINED IN MEDICAL FLOOR.  NGT [    ]    BLANCA [   ]      GT [   ]    ALLERGIES:  No Known Allergies      Daily     Daily     VITALS:    Vital Signs Last 24 Hrs  T(C): 36.4 (04 Apr 2022 05:00), Max: 37.1 (03 Apr 2022 20:19)  T(F): 97.6 (04 Apr 2022 05:00), Max: 98.8 (03 Apr 2022 20:19)  HR: 78 (04 Apr 2022 05:00) (74 - 78)  BP: 128/68 (04 Apr 2022 05:00) (124/63 - 133/78)  BP(mean): --  RR: 19 (04 Apr 2022 05:00) (18 - 19)  SpO2: 98% (04 Apr 2022 05:00) (95% - 98%)    LABS:    CBC Full  -  ( 04 Apr 2022 07:19 )  WBC Count : 7.75 K/uL  RBC Count : 3.76 M/uL  Hemoglobin : 12.1 g/dL  Hematocrit : 37.2 %  Platelet Count - Automated : 160 K/uL  Mean Cell Volume : 98.9 fl  Mean Cell Hemoglobin : 32.2 pg  Mean Cell Hemoglobin Concentration : 32.5 gm/dL  Auto Neutrophil # : x  Auto Lymphocyte # : x  Auto Monocyte # : x  Auto Eosinophil # : x  Auto Basophil # : x  Auto Neutrophil % : x  Auto Lymphocyte % : x  Auto Monocyte % : x  Auto Eosinophil % : x  Auto Basophil % : x      04-04    137  |  105  |  9   ----------------------------<  118<H>  3.7   |  26  |  0.56    Ca    8.8      04 Apr 2022 07:19      CAPILLARY BLOOD GLUCOSE      POCT Blood Glucose.: 100 mg/dL (04 Apr 2022 06:47)  POCT Blood Glucose.: 92 mg/dL (04 Apr 2022 00:05)  POCT Blood Glucose.: 106 mg/dL (03 Apr 2022 21:02)  POCT Blood Glucose.: 88 mg/dL (03 Apr 2022 16:56)  POCT Blood Glucose.: 102 mg/dL (03 Apr 2022 12:09)          Creatinine Trend: 0.56<--, 0.52<--, 0.78<--, 1.12<--, 1.19<--, 1.32<--  I&O's Summary          Clean Catch Clean Catch (Midstream)  04-01 @ 02:07   >=3 organisms. Probable collection contamination.  --  --      .Blood Blood  03-31 @ 21:02   No growth to date.  --  --          MEDICATIONS:    MEDICATIONS  (STANDING):  cefepime   IVPB      cefepime   IVPB 1000 milliGRAM(s) IV Intermittent every 12 hours  chlorhexidine 2% Cloths 1 Application(s) Topical <User Schedule>  dextrose 5% + sodium chloride 0.9%. 1000 milliLiter(s) (75 mL/Hr) IV Continuous <Continuous>  dextrose 50% Injectable 25 Gram(s) IV Push once  glucagon  Injectable 1 milliGRAM(s) IntraMuscular once  insulin lispro (ADMELOG) corrective regimen sliding scale   SubCutaneous every 6 hours  levETIRAcetam  IVPB 500 milliGRAM(s) IV Intermittent every 12 hours  mupirocin 2% Ointment 1 Application(s) Both Nostrils two times a day  pantoprazole  Injectable 40 milliGRAM(s) IV Push daily  polyethylene glycol 3350 17 Gram(s) Oral two times a day  senna 2 Tablet(s) Oral at bedtime      MEDICATIONS  (PRN):  acetaminophen     Tablet .. 650 milliGRAM(s) Oral every 6 hours PRN Temp greater or equal to 38C (100.4F), Mild Pain (1 - 3)  ALBUTerol    90 MICROgram(s) HFA Inhaler 2 Puff(s) Inhalation every 6 hours PRN Shortness of Breath and/or Wheezing  aluminum hydroxide/magnesium hydroxide/simethicone Suspension 30 milliLiter(s) Oral every 4 hours PRN Dyspepsia  melatonin 3 milliGRAM(s) Oral at bedtime PRN Insomnia  ondansetron Injectable 4 milliGRAM(s) IV Push every 8 hours PRN Nausea and/or Vomiting      REVIEW OF SYSTEMS:                           ALL ROS DONE [ X   ]    CONSTITUTIONAL:  LETHARGIC [   ], FEVER [   ], UNRESPONSIVE [   ]  CVS:  CP  [   ], SOB, [   ], PALPITATIONS [   ], DIZZYNESS [   ]  RS: COUGH [   ], SPUTUM [   ]  GI: ABDOMINAL PAIN [   ], NAUSEA [   ], VOMITINGS [   ], DIARRHEA [   ], CONSTIPATION [   ]  :  DYSURIA [   ], NOCTURIA [   ], INCREASED FREQUENCY [   ], DRIBLING [   ],  SKELETAL: PAINFUL JOINTS [   ], SWOLLEN JOINTS [   ], NECK ACHE [   ], LOW BACK ACHE [   ],  SKIN : ULCERS [   ], RASH [   ], ITCHING [   ]  CNS: HEAD ACHE [   ], DOUBLE VISION [   ], BLURRED VISION [   ], AMS / CONFUSION [   ], SEIZURES [   ], WEAKNESS [   ],TINGLING / NUMBNESS [   ]    PHYSICAL EXAMINATION:  GENERAL APPEARANCE: NO DISTRESS  HEENT:  NO PALLOR, NO  JVD,  NO   NODES, NECK SUPPLE  CVS: S1 +, S2 +,   RS: AEEB,  OCCASIONAL  RALES +,   NO RONCHI  ABD: SOFT, NT, NO, BS +  EXT: NO PE  SKIN: WARM,  PRESSURE ULCER+  SKELETAL:  ROM ACCEPTABLE  CNS:  AAO X 1    RADIOLOGY :    ACC: [id number] EXAM:  CT ABDOMEN AND PELVIS                        ACC: [id number] EXAM:  CT CHEST                          PROCEDURE DATE:  03/31/2022          INTERPRETATION:  CLINICAL INFORMATION: Sepsis of unknown source.    COMPARISON: CT chest abdomen 12/31/2020    CONTRAST/COMPLICATIONS:  IV Contrast: NONE  Oral Contrast: NONE  Complications: None reported at time of study completion    PROCEDURE:  CT of the Chest, Abdomen and Pelvis was performed.  Sagittal and coronal reformats were performed.    FINDINGS:    CHEST:  LUNGS AND LARGE AIRWAYS: Trace central airway secretions. Prominent   biapical pleuroparenchymal scarring. Few groundglass opacities and   clustered tree-in-bud nodularity of the right upper lobe and to a lesser   extent,the right middle lobe. Findings may be infectious or   inflammatory. 4 mm right middle lobe nodule, image 111 series 2, stable   since 12/31/2020. Few additional calcified granulomas noted.  PLEURA: No pleural effusion or pneumothorax.  VESSELS: Atheromatous change of the thoracic aorta which is borderline   aneurysmal at the proximal segment, 3.1 cm and mildly aneurysmal at the   distal segment, 3.3 cm. Main pulmonary artery size is within normal limits  HEART: Heart size is qualitatively withinnormal limits. There is   coronary artery calcification and/or stenting. Correlate with procedural   history. Trace pericardial fluid.  MEDIASTINUM AND FUAD: Small volume nodes.  CHEST WALL AND LOWER NECK: No chest wall hematoma. Chronic left scapular   deformity.    ABDOMEN AND PELVIS:    Solid organ evaluation is limited due to noncontrast technique.    LIVER: Liver size within normal limits  BILE DUCTS: No biliary distention  GALLBLADDER: Nonspecific gallbladder distention. Correlate with LFTs.  SPLEEN: Normal size  PANCREAS: Atrophy of the pancreatic parenchyma. No main ductal dilatation  ADRENALS: Adrenal gland thickening, nonspecific  KIDNEYS/URETERS: No hydronephrosis. Bilateral renal hypodensities   suboptimally characterized in the absence of IV contrast.    BLADDER: Underdistended.  REPRODUCTIVE ORGANS: Prostate size within normal limits.    BOWEL: Limited evaluation the absence of oral contrast. Stomach is   underdistended. No small bowel distention. The appendix is noninflamed.   There is diffuse long segment colonic wall inflammation and large fecal   load of the rectum.  PERITONEUM: Trace fluid.  VESSELS: 6.4 x 6.2 cm infrarenal abdominal aortic aneurysm, previously   6.1 x 6.1 cm on 12/31/2020. Few apparent displaced intimal calcifications   associated with the aneurysm are new since prior CT from 12/31/2020,   possibly related to organizing peripheral mural thrombus. Borderline   aneurysmal dilatation/ectasia of the common iliac arteries as well, 1.7   cm on the right and 1.5 cm and the left, stable.  RETROPERITONEUM/LYMPH NODES: Small volume nodes.  ABDOMINAL WALL: Tiny fat-containing umbilical hernia.  BONES: Degenerative changes and osseous demineralization. L5-S1 pars   interarticularis defects redemonstrated. Compression deformities of T3   and T8-T10 vertebral bodies are stable since 12/31/2020. Chronic left   scapular deformity. Chronic left clavicular deformity partially imaged.   Left ulna fixation hardware incidentally noted. Few well marginated   subcentimeter sclerotic foci of the proximal right femur may represent   bone islands.    IMPRESSION:    Noncontrast study.    CHEST:  1. Few groundglass opacities and clustered/tree-in-bud nodularity of the   right upper lobe, and to a lesser extentthe right middle lobe, may be   infectious or inflammatory.  2. Coronary artery calcifications versus stenting. Correlate with   procedural history.    ABDOMEN/PELVIS:  1. Pan-proctocolitis. Large fecal load of rectum.  2. Redemonstrated 6.4 x 6.2 cminfrarenal abdominal aortic aneurysm,   previously 6.1 x 6.1 cm on 12/31/2020.      ASSESSMENT :     Gastrointestinal hemorrhage    Yes    No pertinent past medical history    Dementia    Seizure    Anemia    Depression    HTN (hypertension)    Osteoarthritis    Osteoporosis    Diabetes mellitus    Seizure    T2DM (type 2 diabetes mellitus)    Seizures    Vitamin D deficiency    HTN (hypertension)    History of atherosclerosis    PVD (peripheral vascular disease)    Vascular dementia    Chronic obstructive pulmonary disease, unspecified COPD type    DM (diabetes mellitus)    No significant past surgical history        PLAN:  HPI:  Patient is a 85 y/o male, baseline AA0x1 and minimally ambulatory, with significant medical history of HTN, T2DM, PVOD, COPD, AAA (5.2cm), Tuberculosis, Seizure disorder, Anxiety, Vascular Dementia, Prior SAH/SDH, Iron Deficiency Anemia, Hypothyroidism, and Urinary Incontinence presented from [facility] with complaints of melena. The patient was very altered while in the ED and unable to provide any history or ROS; collateral history was obtained from facility records and Sister (Mrs. Candelaria Martin), and niece (Mrs. Mercy Moon). They reported that they had just found out about the melena today, and that he has no prior history of GIB in the past. As per med rec from facility, patient is not on ASA, or any blood thinners. Patient  was noted to be hypotensive to 60/38, , temp 98.7, and saturating 96% on RA. Initial labs showed a lactate of 6.1, WBC of 57, and Hb of 12.7; Pt was transfused 2U PRBC in and given 3L isotonic fluids upon which his BP improved. ICU was consulted initially for septic shock +/- acute GIB but as patient was fluid responsive and regained some mental status, he was found to be stable enough to go to general floor for further workup.  (31 Mar 2022 19:25)    # PROGNOSIS IS GUARDED, CASE DISCUSSED AT LENGTH WITH SISTER NORMAL MARTIN AND NIECE MISS MERCY MOON @   [phone] - ALL QUESTIONS ANSWERED    # HYPOTENSION SUSPECTED SEPSIS S/T PNEUMONIA AND COLITIS + HYPOVOLEMIA  # ?MELENA, UNDERLYING NALLELY - ACUTE ON CHRONIC ANEMIA  # CONSTIPATION, OBSTIPATION - RESOLVING  - PLACED ON BROADSPECTRUM ABX - CEFEPIME, F/U BCX AND UCX  - REVIEWED CT CHEST/ABD/PELVIS  - C. DIFF NEGATIVE  - PLACED ON ISOLATION  - PLACED ON PPI DRIP, ADVANCING DIET AS TOLERATED, IVF  - S/P PRBC TRANSFUSION, TRANSFUSION THRESHOLD HGB < 7  - S/P CRITICAL CARE CONSULT, GASTROENTEROLOGY CONSULT, ID CONSULT    # LEUKOCYTOSIS - LIKELY LEUKEMOID REACTION - IMPROVING  - HEME/ONC CONSULT IN PROGRESS    # ARCHANA - IMPROVING - PLACED ON IVF, REVIEWED UA, MONITOR CR, AVOID NEPHROTOXIC AGENTS    # ELEVATED LACTIC ACID - TRENDING LACTIC ACID    # HYPOKALEMIA - REPLETING WITH SUPPLEMENT    # HYPERCALCEMIA - SUSPECT S/T DEHYDRATION - F/U IONIZED CA, F/U PTH, F/U PTHRP, F/U VIT D    # ACUTE ENCEPHALOPATHY ON ADVANCED VASCULAR DEMENTIA, HX OF SAH/SDH - PATIENT IMPROVED TO ? BASELINE AFTER IVF AND PRBC    # AAA - VASCULAR SX CONSULT PLACED  - CTA AORTA ORDERED TO EVALUATE AFTER EXTENSIVE DISCUSSION WITH FAMILY NIECE MISS MADRID [place] @   [phone] ; VASCULAR TEAM ALSO D/W NIECE    # PRESSURE ULCER - WOUND CARE, PATIENT IS HIGH RISK FOR PRESSURE ULCERS DESPITE PREVENTIVE MEASURES IN PLACE    # SUSPECT SEVERE PROTEIN CALORIE NUTRITION - NUTRITIONAL SUPPLEMENT    # URINARY INCONTINENCE    # HTN  # T2DM  # PVOD  # COPD    # HX OF TB  # SEIZURE DISORDER  # HYPOTHYROIDISM  # GI PPX

## 2022-04-05 DIAGNOSIS — F03.90 UNSPECIFIED DEMENTIA WITHOUT BEHAVIORAL DISTURBANCE: ICD-10-CM

## 2022-04-05 DIAGNOSIS — Z51.5 ENCOUNTER FOR PALLIATIVE CARE: ICD-10-CM

## 2022-04-05 DIAGNOSIS — E43 UNSPECIFIED SEVERE PROTEIN-CALORIE MALNUTRITION: ICD-10-CM

## 2022-04-05 DIAGNOSIS — R53.2 FUNCTIONAL QUADRIPLEGIA: ICD-10-CM

## 2022-04-05 LAB
ANION GAP SERPL CALC-SCNC: 4 MMOL/L — LOW (ref 5–17)
BUN SERPL-MCNC: 8 MG/DL — SIGNIFICANT CHANGE UP (ref 7–18)
CALCIUM SERPL-MCNC: 9 MG/DL — SIGNIFICANT CHANGE UP (ref 8.4–10.5)
CHLORIDE SERPL-SCNC: 105 MMOL/L — SIGNIFICANT CHANGE UP (ref 96–108)
CO2 SERPL-SCNC: 29 MMOL/L — SIGNIFICANT CHANGE UP (ref 22–31)
CREAT SERPL-MCNC: 0.52 MG/DL — SIGNIFICANT CHANGE UP (ref 0.5–1.3)
CULTURE RESULTS: SIGNIFICANT CHANGE UP
CULTURE RESULTS: SIGNIFICANT CHANGE UP
EGFR: 99 ML/MIN/1.73M2 — SIGNIFICANT CHANGE UP
GLUCOSE BLDC GLUCOMTR-MCNC: 114 MG/DL — HIGH (ref 70–99)
GLUCOSE BLDC GLUCOMTR-MCNC: 87 MG/DL — SIGNIFICANT CHANGE UP (ref 70–99)
GLUCOSE BLDC GLUCOMTR-MCNC: 98 MG/DL — SIGNIFICANT CHANGE UP (ref 70–99)
GLUCOSE BLDC GLUCOMTR-MCNC: 99 MG/DL — SIGNIFICANT CHANGE UP (ref 70–99)
GLUCOSE SERPL-MCNC: 88 MG/DL — SIGNIFICANT CHANGE UP (ref 70–99)
HCT VFR BLD CALC: 36.2 % — LOW (ref 39–50)
HGB BLD-MCNC: 11.9 G/DL — LOW (ref 13–17)
MCHC RBC-ENTMCNC: 32.3 PG — SIGNIFICANT CHANGE UP (ref 27–34)
MCHC RBC-ENTMCNC: 32.9 GM/DL — SIGNIFICANT CHANGE UP (ref 32–36)
MCV RBC AUTO: 98.4 FL — SIGNIFICANT CHANGE UP (ref 80–100)
NRBC # BLD: 0 /100 WBCS — SIGNIFICANT CHANGE UP (ref 0–0)
PLATELET # BLD AUTO: 161 K/UL — SIGNIFICANT CHANGE UP (ref 150–400)
POTASSIUM SERPL-MCNC: 3.9 MMOL/L — SIGNIFICANT CHANGE UP (ref 3.5–5.3)
POTASSIUM SERPL-SCNC: 3.9 MMOL/L — SIGNIFICANT CHANGE UP (ref 3.5–5.3)
RBC # BLD: 3.68 M/UL — LOW (ref 4.2–5.8)
RBC # FLD: 16.5 % — HIGH (ref 10.3–14.5)
SARS-COV-2 RNA SPEC QL NAA+PROBE: SIGNIFICANT CHANGE UP
SODIUM SERPL-SCNC: 138 MMOL/L — SIGNIFICANT CHANGE UP (ref 135–145)
SPECIMEN SOURCE: SIGNIFICANT CHANGE UP
SPECIMEN SOURCE: SIGNIFICANT CHANGE UP
WBC # BLD: 10.11 K/UL — SIGNIFICANT CHANGE UP (ref 3.8–10.5)
WBC # FLD AUTO: 10.11 K/UL — SIGNIFICANT CHANGE UP (ref 3.8–10.5)

## 2022-04-05 PROCEDURE — 99223 1ST HOSP IP/OBS HIGH 75: CPT

## 2022-04-05 PROCEDURE — 74019 RADEX ABDOMEN 2 VIEWS: CPT | Mod: 26

## 2022-04-05 RX ORDER — LACTULOSE 10 G/15ML
15 SOLUTION ORAL ONCE
Refills: 0 | Status: COMPLETED | OUTPATIENT
Start: 2022-04-05 | End: 2022-04-05

## 2022-04-05 RX ADMIN — CHLORHEXIDINE GLUCONATE 1 APPLICATION(S): 213 SOLUTION TOPICAL at 06:37

## 2022-04-05 RX ADMIN — PANTOPRAZOLE SODIUM 40 MILLIGRAM(S): 20 TABLET, DELAYED RELEASE ORAL at 11:29

## 2022-04-05 RX ADMIN — LEVETIRACETAM 420 MILLIGRAM(S): 250 TABLET, FILM COATED ORAL at 17:24

## 2022-04-05 RX ADMIN — LACTULOSE 15 GRAM(S): 10 SOLUTION ORAL at 22:29

## 2022-04-05 RX ADMIN — MUPIROCIN 1 APPLICATION(S): 20 OINTMENT TOPICAL at 06:37

## 2022-04-05 RX ADMIN — LEVETIRACETAM 420 MILLIGRAM(S): 250 TABLET, FILM COATED ORAL at 06:36

## 2022-04-05 RX ADMIN — MUPIROCIN 1 APPLICATION(S): 20 OINTMENT TOPICAL at 17:24

## 2022-04-05 RX ADMIN — POLYETHYLENE GLYCOL 3350 17 GRAM(S): 17 POWDER, FOR SOLUTION ORAL at 17:24

## 2022-04-05 RX ADMIN — POLYETHYLENE GLYCOL 3350 17 GRAM(S): 17 POWDER, FOR SOLUTION ORAL at 06:37

## 2022-04-05 RX ADMIN — CEFEPIME 100 MILLIGRAM(S): 1 INJECTION, POWDER, FOR SOLUTION INTRAMUSCULAR; INTRAVENOUS at 06:36

## 2022-04-05 RX ADMIN — CEFEPIME 100 MILLIGRAM(S): 1 INJECTION, POWDER, FOR SOLUTION INTRAMUSCULAR; INTRAVENOUS at 17:24

## 2022-04-05 RX ADMIN — SENNA PLUS 2 TABLET(S): 8.6 TABLET ORAL at 22:30

## 2022-04-05 NOTE — PROGRESS NOTE ADULT - ASSESSMENT
87 y/o male, baseline AA0x1 from [facility] with PMHX of HTN, T2DM, PVOD, COPD, AAA (5.2cm), Tuberculosis, Seizure disorder, Anxiety, Vascular Dementia, Prior SAH/SDH, Iron Deficiency Anemia, Hypothyroidism, and Urinary Incontinence presented from [facility] with complaints of melena. Admitted for severe sepsis of unknown source also with "melena" requiring 2U PRBCs GI Patrice consulted. Started on Cefepime. BC/UC NGTD- ID sakshi follows. CT scan found Pan-proctocolitis. Large fecal load of rectum.and incidental findings of enlarging 6.4 x 6.2 cm infrarenal abdominal aortic aneurysm, vascular SX consulted and patient found to be poor surgical candidate. Plan for palliative to discuss with family

## 2022-04-05 NOTE — PROGRESS NOTE ADULT - SUBJECTIVE AND OBJECTIVE BOX
[   ] ICU                                          [   ] CCU                                      [ X  ] Medical Floor    Patient is a 86 year old male with anemia and melena. GI consulted to evaluate.        Patient is a 86 year old male, baseline AA0x1 and minimally ambulatory, with past medical history significant for HTN, T2DM, PVOD, COPD, AAA (5.2cm), Tuberculosis, Seizure disorder, Anxiety, Vascular Dementia, Prior SAH/SDH, Iron Deficiency Anemia, Hypothyroidism, and Urinary Incontinence presented from [facility] with complaints of melena. The patient was very altered while in the ED and unable to provide any history or ROS; collateral history was obtained from facility records and Sister (Mrs. Candelaria Martin), and niece (Mrs. Viviana Moon). They reported that they had just found out about the melena today, and that he has no prior history of GIB in the past. As per med rec from facility, patient is not on ASA, or any blood thinners. Patient  was noted to be hypotensive to 60/38, , temp 98.7, and saturating 96% on RA. Initial labs showed a lactate of 6.1, WBC of 57, and Hb of 12.7; Pt was transfused 2U PRBC in and given 3L isotonic fluids upon which his BP improved. ICU was consulted initially for septic shock +/- acute GIB but as patient was fluid responsive and regained some mental status, he was found to be stable enough to go to general floor for further workup.      Patient appears comfortable. No new complaints reported, No abdominal pain, N/V, hematemesis, hematochezia, melena, fever, chills, chest pain, SOB, cough or diarrhea reported.      PAIN MANAGEMENT:  Pain Scale:                 0/10  Pain Location:      Prior Colonoscopy:  No prior colonoscopy    PAST MEDICAL HISTORY   Seizure disorder  Depression  HTN (hypertension)  Osteoarthritis  Osteoporosis  Diabetes mellitus  DM  Vitamin D deficiency  CAD  PVD    Vascular dementia  COPD       PAST SURGICAL HISTORY  No significant past surgical history        Allergies    No Known Allergies    Intolerances  None       SOCIAL HISTORY  Advanced Directives:       [  ] Full Code       [ X ] DNR  Marital Status:         [  ] M      [ X ] S      [  ] D       [  ] W  Children:       [ X ] Yes      [  ] No  Occupation:        [  ] Employed       [ X ] Unemployed       [  ] Retired  Diet:       [ X ] Regular       [  ] PEG feeding          [  ] NG tube feeding  Drug Use:           [ X ] No             [  ] Yes  Alcohol:           [ X ] No             [  ] Yes (socially)         [  ] Yes (chronic)  Tobacco:           [  ] Yes           [ X ] No      FAMILY HISTORY  [ X ] Heart Disease            [ X ] Diabetes             [ X ] HTN             [  ] Colon Cancer             [  ] Stomach Cancer              [  ] Pancreatic Cancer        VITALS  Vital Signs Last 24 Hrs  T(C): 36.6 (05 Apr 2022 12:47), Max: 37.2 (04 Apr 2022 21:04)  T(F): 97.9 (05 Apr 2022 12:47), Max: 98.9 (04 Apr 2022 21:04)  HR: 78 (05 Apr 2022 12:47) (72 - 78)  BP: 108/56 (05 Apr 2022 12:47) (108/56 - 127/67)     RR: 17 (05 Apr 2022 12:47) (17 - 18)  SpO2: 95% (05 Apr 2022 12:47) (95% - 96%)       MEDICATIONS  (STANDING):  cefepime   IVPB      cefepime   IVPB 1000 milliGRAM(s) IV Intermittent every 12 hours  chlorhexidine 2% Cloths 1 Application(s) Topical <User Schedule>  dextrose 5% + sodium chloride 0.9%. 1000 milliLiter(s) (75 mL/Hr) IV Continuous <Continuous>  dextrose 50% Injectable 25 Gram(s) IV Push once  glucagon  Injectable 1 milliGRAM(s) IntraMuscular once  insulin lispro (ADMELOG) corrective regimen sliding scale   SubCutaneous every 6 hours  levETIRAcetam  IVPB 500 milliGRAM(s) IV Intermittent every 12 hours  mupirocin 2% Ointment 1 Application(s) Both Nostrils two times a day  pantoprazole  Injectable 40 milliGRAM(s) IV Push daily  polyethylene glycol 3350 17 Gram(s) Oral two times a day  polyethylene glycol/electrolyte Solution. 4000 milliLiter(s) Oral once  senna 2 Tablet(s) Oral at bedtime    MEDICATIONS  (PRN):  acetaminophen     Tablet .. 650 milliGRAM(s) Oral every 6 hours PRN Temp greater or equal to 38C (100.4F), Mild Pain (1 - 3)  ALBUTerol    90 MICROgram(s) HFA Inhaler 2 Puff(s) Inhalation every 6 hours PRN Shortness of Breath and/or Wheezing  aluminum hydroxide/magnesium hydroxide/simethicone Suspension 30 milliLiter(s) Oral every 4 hours PRN Dyspepsia  melatonin 3 milliGRAM(s) Oral at bedtime PRN Insomnia  ondansetron Injectable 4 milliGRAM(s) IV Push every 8 hours PRN Nausea and/or Vomiting                            11.9   10.11 )-----------( 161      ( 05 Apr 2022 06:30 )             36.2       04-05    138  |  105  |  8   ----------------------------<  88  3.9   |  29  |  0.52    Ca    9.0      05 Apr 2022 06:30      ACC: [id number] EXAM:  CT ANGIO ABD PELV (W)AW IC                          PROCEDURE DATE:  04/04/2022          INTERPRETATION:  CLINICAL INFORMATION: Abdominal aortic aneurysm without   rupture    COMPARISON: CT abdomen pelvis 3/31/2022    CONTRAST/COMPLICATIONS:  IV Contrast: Omnipaque 350  90 cc administered   10 cc discarded  Oral Contrast: NONE  Complications: None reported at time of study completion    PROCEDURE:  CT Angiography of the Abdomen and Pelvis.  Arterial phase images were acquired.  Sagittal and coronal reformats were performed as well as 3D (MIP)   reconstructions.    FINDINGS:  LOWER CHEST: Small bilateral pleural effusions and bibasilar atelectasis.    LIVER: Normal.  BILE DUCTS: Nondilated.  GALLBLADDER: Normal.  SPLEEN:Normal.  PANCREAS: Normal.  ADRENALS: Mild bilateral thickening.  KIDNEYS/URETERS: No calculi, hydronephrosis, or soft tissue attenuating   mass.  BLADDER: Normal.  REPRODUCTIVE ORGANS: Nonenlarged.    BOWEL: Persistent left-sided colitis, improved. No obstruction.  PERITONEUM: No free air or ascites.    VESSELS: Fusiform infrarenal abdominal aortic aneurysm measures 6.7 x 6.4   cm, previously 6.3 x 6.2 cm in 12/31/2020. There is increase in eccentric   mural thrombus. Angulated neck morphology with approximately 1.7 cm   distance to the lowest left renal artery.    Mild bilateral iliac tortuosity. Widely patent iliac runoff to the   bilateral proximal femorals. 2.0 cm bilateral common iliac artery   ectasia. Celiac artery is widely patent. Replaced left hepatic artery   arising from the left gastric artery. SMA is widely patent. DNAIELLA is   patent. Both renal arteries are single and normal caliber patent.    RETROPERITONEUM/LYMPH NODES: No adenopathy or hematoma.  ABDOMINAL WALL: Normal.  BONES: No aggressive lesion.    IMPRESSION:  *  Fusiform infrarenal abdominal aortic aneurysm measures 6.7 x 6.4 cm,   previously 6.3 x 6.2 cm in 12/31/2020.  *  Improving left-sided colitis.

## 2022-04-05 NOTE — CONSULT NOTE ADULT - PROBLEM SELECTOR RECOMMENDATION 4
Pt is WC/bedbound.  Total care. Requires 2 person assist for transfer. High risk for skin failure.  Supportive care.  OOB to chair as tolerated

## 2022-04-05 NOTE — PROGRESS NOTE ADULT - ASSESSMENT
1. Iron deficiency anemia  2. Melena stopped  3. No evidence of acute GI bleeding  4. Sepsis  5. Colitis improving  6. Hypokalemia    Suggestions:    1. Monitor H/H  2.Transfuse PRBC as needed  3. Protonix daily  4. Avoid NSAID  5. ID Follow up  6. Antibiotics as per ID  7. DVT prophylaxis

## 2022-04-05 NOTE — CONSULT NOTE ADULT - CONSULT REASON
AAA
Pneumonia / Colitis
Hypotension
leukocytosis
Discuss complex medical decision making related to goals of care due to advanced dementia, and multiple comorbidities.
GI bleeding

## 2022-04-05 NOTE — CONSULT NOTE ADULT - CONVERSATION DETAILS
Spoke with the pt's niece Viviana Moon ( [phone]), discussed his current clinical condition and his eligibility for hospice.  She shared the pt has children with whom he is estranged, which is why she is his surrogate.  She acknowledged that dementia has changed the pt and had caused his cognitive and functional decline.  Discussed the balance of benefits and harms involved in AAA surgery;  given the pt's overall declining health the risk may outweigh the benefits.  Akiko agreed not to pursue surgical intervention for AAA.   Discussed hospice philosophy and locations of care.  She explained hospice may be appropriate, however, her brother was just in an accident on life support at [facility] and she cannot bring herself to discuss hospice with her mother at this time.    Confirmed [facility]OLSt on file; DNR/DNI.  All questions answered.  Support provided  Plan discussed with the primary team.

## 2022-04-05 NOTE — PROGRESS NOTE ADULT - PROBLEM SELECTOR PLAN 5
presented with melena  s/p 2 units PRBC   H&H stable  FOBT negative  No evidence of acute GIB  CT abd result as above shows pan-proctocolitis  Continue PPI daily  Avoid NSAIDs  Monitor CBC and transfuse for Hgb < 7  GI Dr. Ibrahim following

## 2022-04-05 NOTE — PROGRESS NOTE ADULT - SUBJECTIVE AND OBJECTIVE BOX
Patient is a 86y old  Male who presents with a chief complaint of Encephalopathy, Hypotension (04 Apr 2022 18:23)    PATIENT IS SEEN AND EXAMINED IN MEDICAL FLOOR.  NGT [    ]    BLANCA [   ]      GT [   ]    ALLERGIES:  No Known Allergies      Daily     Daily     VITALS:    Vital Signs Last 24 Hrs  T(C): 36.7 (05 Apr 2022 05:29), Max: 37.2 (04 Apr 2022 21:04)  T(F): 98.1 (05 Apr 2022 05:29), Max: 98.9 (04 Apr 2022 21:04)  HR: 77 (05 Apr 2022 05:29) (72 - 77)  BP: 117/57 (05 Apr 2022 05:29) (117/57 - 128/70)  BP(mean): --  RR: 17 (05 Apr 2022 05:29) (17 - 18)  SpO2: 95% (05 Apr 2022 05:29) (95% - 96%)    LABS:    CBC Full  -  ( 05 Apr 2022 06:30 )  WBC Count : 10.11 K/uL  RBC Count : 3.68 M/uL  Hemoglobin : 11.9 g/dL  Hematocrit : 36.2 %  Platelet Count - Automated : 161 K/uL  Mean Cell Volume : 98.4 fl  Mean Cell Hemoglobin : 32.3 pg  Mean Cell Hemoglobin Concentration : 32.9 gm/dL  Auto Neutrophil # : x  Auto Lymphocyte # : x  Auto Monocyte # : x  Auto Eosinophil # : x  Auto Basophil # : x  Auto Neutrophil % : x  Auto Lymphocyte % : x  Auto Monocyte % : x  Auto Eosinophil % : x  Auto Basophil % : x      04-05    138  |  105  |  8   ----------------------------<  88  3.9   |  29  |  0.52    Ca    9.0      05 Apr 2022 06:30      CAPILLARY BLOOD GLUCOSE      POCT Blood Glucose.: 87 mg/dL (05 Apr 2022 05:52)  POCT Blood Glucose.: 100 mg/dL (04 Apr 2022 23:26)  POCT Blood Glucose.: 106 mg/dL (04 Apr 2022 16:55)  POCT Blood Glucose.: 101 mg/dL (04 Apr 2022 11:25)          Creatinine Trend: 0.52<--, 0.56<--, 0.52<--, 0.78<--, 1.12<--, 1.19<--  I&O's Summary          Clean Catch Clean Catch (Midstream)  04-01 @ 02:07   >=3 organisms. Probable collection contamination.  --  --      .Blood Blood  03-31 @ 21:02   No growth to date.  --  --          MEDICATIONS:    MEDICATIONS  (STANDING):  cefepime   IVPB      cefepime   IVPB 1000 milliGRAM(s) IV Intermittent every 12 hours  chlorhexidine 2% Cloths 1 Application(s) Topical <User Schedule>  dextrose 5% + sodium chloride 0.9%. 1000 milliLiter(s) (75 mL/Hr) IV Continuous <Continuous>  dextrose 50% Injectable 25 Gram(s) IV Push once  glucagon  Injectable 1 milliGRAM(s) IntraMuscular once  insulin lispro (ADMELOG) corrective regimen sliding scale   SubCutaneous every 6 hours  levETIRAcetam  IVPB 500 milliGRAM(s) IV Intermittent every 12 hours  mupirocin 2% Ointment 1 Application(s) Both Nostrils two times a day  pantoprazole  Injectable 40 milliGRAM(s) IV Push daily  polyethylene glycol 3350 17 Gram(s) Oral two times a day  polyethylene glycol/electrolyte Solution. 4000 milliLiter(s) Oral once  senna 2 Tablet(s) Oral at bedtime      MEDICATIONS  (PRN):  acetaminophen     Tablet .. 650 milliGRAM(s) Oral every 6 hours PRN Temp greater or equal to 38C (100.4F), Mild Pain (1 - 3)  ALBUTerol    90 MICROgram(s) HFA Inhaler 2 Puff(s) Inhalation every 6 hours PRN Shortness of Breath and/or Wheezing  aluminum hydroxide/magnesium hydroxide/simethicone Suspension 30 milliLiter(s) Oral every 4 hours PRN Dyspepsia  melatonin 3 milliGRAM(s) Oral at bedtime PRN Insomnia  ondansetron Injectable 4 milliGRAM(s) IV Push every 8 hours PRN Nausea and/or Vomiting      REVIEW OF SYSTEMS:                           ALL ROS DONE [ X   ]    CONSTITUTIONAL:  LETHARGIC [   ], FEVER [   ], UNRESPONSIVE [   ]  CVS:  CP  [   ], SOB, [   ], PALPITATIONS [   ], DIZZYNESS [   ]  RS: COUGH [   ], SPUTUM [   ]  GI: ABDOMINAL PAIN [   ], NAUSEA [   ], VOMITINGS [   ], DIARRHEA [   ], CONSTIPATION [   ]  :  DYSURIA [   ], NOCTURIA [   ], INCREASED FREQUENCY [   ], DRIBLING [   ],  SKELETAL: PAINFUL JOINTS [   ], SWOLLEN JOINTS [   ], NECK ACHE [   ], LOW BACK ACHE [   ],  SKIN : ULCERS [   ], RASH [   ], ITCHING [   ]  CNS: HEAD ACHE [   ], DOUBLE VISION [   ], BLURRED VISION [   ], AMS / CONFUSION [   ], SEIZURES [   ], WEAKNESS [   ],TINGLING / NUMBNESS [   ]      PHYSICAL EXAMINATION:  GENERAL APPEARANCE: NO DISTRESS  HEENT:  NO PALLOR, NO  JVD,  NO   NODES, NECK SUPPLE  CVS: S1 +, S2 +,   RS: AEEB,  OCCASIONAL  RALES +,   NO RONCHI  ABD: SOFT, NT, NO, BS +  EXT: NO PE  SKIN: WARM,  PRESSURE ULCER+  SKELETAL:  ROM ACCEPTABLE  CNS:  AAO X 1    RADIOLOGY :    ACC: [id number] EXAM:  CT ABDOMEN AND PELVIS                        ACC: [id number] EXAM:  CT CHEST                          PROCEDURE DATE:  03/31/2022          INTERPRETATION:  CLINICAL INFORMATION: Sepsis of unknown source.    COMPARISON: CT chest abdomen 12/31/2020    CONTRAST/COMPLICATIONS:  IV Contrast: NONE  Oral Contrast: NONE  Complications: None reported at time of study completion    PROCEDURE:  CT of the Chest, Abdomen and Pelvis was performed.  Sagittal and coronal reformats were performed.    FINDINGS:    CHEST:  LUNGS AND LARGE AIRWAYS: Trace central airway secretions. Prominent   biapical pleuroparenchymal scarring. Few groundglass opacities and   clustered tree-in-bud nodularity of the right upper lobe and to a lesser   extent,the right middle lobe. Findings may be infectious or   inflammatory. 4 mm right middle lobe nodule, image 111 series 2, stable   since 12/31/2020. Few additional calcified granulomas noted.  PLEURA: No pleural effusion or pneumothorax.  VESSELS: Atheromatous change of the thoracic aorta which is borderline   aneurysmal at the proximal segment, 3.1 cm and mildly aneurysmal at the   distal segment, 3.3 cm. Main pulmonary artery size is within normal limits  HEART: Heart size is qualitatively withinnormal limits. There is   coronary artery calcification and/or stenting. Correlate with procedural   history. Trace pericardial fluid.  MEDIASTINUM AND FUAD: Small volume nodes.  CHEST WALL AND LOWER NECK: No chest wall hematoma. Chronic left scapular   deformity.    ABDOMEN AND PELVIS:    Solid organ evaluation is limited due to noncontrast technique.    LIVER: Liver size within normal limits  BILE DUCTS: No biliary distention  GALLBLADDER: Nonspecific gallbladder distention. Correlate with LFTs.  SPLEEN: Normal size  PANCREAS: Atrophy of the pancreatic parenchyma. No main ductal dilatation  ADRENALS: Adrenal gland thickening, nonspecific  KIDNEYS/URETERS: No hydronephrosis. Bilateral renal hypodensities   suboptimally characterized in the absence of IV contrast.    BLADDER: Underdistended.  REPRODUCTIVE ORGANS: Prostate size within normal limits.    BOWEL: Limited evaluation the absence of oral contrast. Stomach is   underdistended. No small bowel distention. The appendix is noninflamed.   There is diffuse long segment colonic wall inflammation and large fecal   load of the rectum.  PERITONEUM: Trace fluid.  VESSELS: 6.4 x 6.2 cm infrarenal abdominal aortic aneurysm, previously   6.1 x 6.1 cm on 12/31/2020. Few apparent displaced intimal calcifications   associated with the aneurysm are new since prior CT from 12/31/2020,   possibly related to organizing peripheral mural thrombus. Borderline   aneurysmal dilatation/ectasia of the common iliac arteries as well, 1.7   cm on the right and 1.5 cm and the left, stable.  RETROPERITONEUM/LYMPH NODES: Small volume nodes.  ABDOMINAL WALL: Tiny fat-containing umbilical hernia.  BONES: Degenerative changes and osseous demineralization. L5-S1 pars   interarticularis defects redemonstrated. Compression deformities of T3   and T8-T10 vertebral bodies are stable since 12/31/2020. Chronic left   scapular deformity. Chronic left clavicular deformity partially imaged.   Left ulna fixation hardware incidentally noted. Few well marginated   subcentimeter sclerotic foci of the proximal right femur may represent   bone islands.    IMPRESSION:    Noncontrast study.    CHEST:  1. Few groundglass opacities and clustered/tree-in-bud nodularity of the   right upper lobe, and to a lesser extentthe right middle lobe, may be   infectious or inflammatory.  2. Coronary artery calcifications versus stenting. Correlate with   procedural history.    ABDOMEN/PELVIS:  1. Pan-proctocolitis. Large fecal load of rectum.  2. Redemonstrated 6.4 x 6.2 cminfrarenal abdominal aortic aneurysm,   previously 6.1 x 6.1 cm on 12/31/2020.      ASSESSMENT :     Gastrointestinal hemorrhage    Yes    No pertinent past medical history    Dementia    Seizure    Anemia    Depression    HTN (hypertension)    Osteoarthritis    Osteoporosis    Diabetes mellitus    Seizure    T2DM (type 2 diabetes mellitus)    Seizures    Vitamin D deficiency    HTN (hypertension)    History of atherosclerosis    PVD (peripheral vascular disease)    Vascular dementia    Chronic obstructive pulmonary disease, unspecified COPD type    DM (diabetes mellitus)    No significant past surgical history        PLAN:  HPI:  Patient is a 85 y/o male, baseline AA0x1 and minimally ambulatory, with significant medical history of HTN, T2DM, PVOD, COPD, AAA (5.2cm), Tuberculosis, Seizure disorder, Anxiety, Vascular Dementia, Prior SAH/SDH, Iron Deficiency Anemia, Hypothyroidism, and Urinary Incontinence presented from [facility] with complaints of melena. The patient was very altered while in the ED and unable to provide any history or ROS; collateral history was obtained from facility records and Sister (Mrs. Candelaria Martin), and niece (Mrs. Mercy Moon). They reported that they had just found out about the melena today, and that he has no prior history of GIB in the past. As per med rec from facility, patient is not on ASA, or any blood thinners. Patient  was noted to be hypotensive to 60/38, , temp 98.7, and saturating 96% on RA. Initial labs showed a lactate of 6.1, WBC of 57, and Hb of 12.7; Pt was transfused 2U PRBC in and given 3L isotonic fluids upon which his BP improved. ICU was consulted initially for septic shock +/- acute GIB but as patient was fluid responsive and regained some mental status, he was found to be stable enough to go to general floor for further workup.  (31 Mar 2022 19:25)    # PROGNOSIS IS GUARDED, CASE DISCUSSED AT LENGTH WITH SISTER NORMAL MARTIN AND NIECE MISS MERCY MOON @   [phone] - ALL QUESTIONS ANSWERED    # HYPOTENSION SUSPECTED SEPSIS S/T PNEUMONIA AND COLITIS + HYPOVOLEMIA  # ?MELENA, UNDERLYING NALLELY - ACUTE ON CHRONIC ANEMIA  # CONSTIPATION, OBSTIPATION - RESOLVING  - PLACED ON BROADSPECTRUM ABX - CEFEPIME, F/U BCX AND UCX  - REVIEWED CT CHEST/ABD/PELVIS  - C. DIFF NEGATIVE  - PLACED ON ISOLATION  - PLACED ON PPI DRIP, ADVANCING DIET AS TOLERATED, IVF  - S/P PRBC TRANSFUSION, TRANSFUSION THRESHOLD HGB < 7  - S/P CRITICAL CARE CONSULT, GASTROENTEROLOGY CONSULT, ID CONSULT    # LEUKOCYTOSIS - LIKELY LEUKEMOID REACTION - IMPROVING  - HEME/ONC CONSULT IN PROGRESS    # ARCHANA - IMPROVING - PLACED ON IVF, REVIEWED UA, MONITOR CR, AVOID NEPHROTOXIC AGENTS    # ELEVATED LACTIC ACID - TRENDING LACTIC ACID    # HYPOKALEMIA - REPLETING WITH SUPPLEMENT    # HYPERCALCEMIA - SUSPECT S/T DEHYDRATION - F/U IONIZED CA, F/U PTH, F/U PTHRP, F/U VIT D    # ACUTE ENCEPHALOPATHY ON ADVANCED VASCULAR DEMENTIA, HX OF SAH/SDH - PATIENT IMPROVED TO ? BASELINE AFTER IVF AND PRBC    # AAA - VASCULAR SX CONSULT PLACED  - CTA AORTA ORDERED TO EVALUATE AFTER EXTENSIVE DISCUSSION WITH FAMILY NIECE MISS MERCY MOON @   [phone] ; VASCULAR TEAM ALSO D/W NIECE    # PRESSURE ULCER - WOUND CARE, PATIENT IS HIGH RISK FOR PRESSURE ULCERS DESPITE PREVENTIVE MEASURES IN PLACE    # SUSPECT SEVERE PROTEIN CALORIE NUTRITION - NUTRITIONAL SUPPLEMENT    # URINARY INCONTINENCE    # HTN  # T2DM  # PVOD  # COPD    # HX OF TB  # SEIZURE DISORDER  # HYPOTHYROIDISM  # GI PPX   Patient is a 86y old  Male who presents with a chief complaint of Encephalopathy, Hypotension (04 Apr 2022 18:23)    PATIENT IS SEEN AND EXAMINED IN MEDICAL FLOOR.  NGT [    ]    BLANCA [   ]      GT [   ]    ALLERGIES:  No Known Allergies      Daily     Daily     VITALS:    Vital Signs Last 24 Hrs  T(C): 36.7 (05 Apr 2022 05:29), Max: 37.2 (04 Apr 2022 21:04)  T(F): 98.1 (05 Apr 2022 05:29), Max: 98.9 (04 Apr 2022 21:04)  HR: 77 (05 Apr 2022 05:29) (72 - 77)  BP: 117/57 (05 Apr 2022 05:29) (117/57 - 128/70)  BP(mean): --  RR: 17 (05 Apr 2022 05:29) (17 - 18)  SpO2: 95% (05 Apr 2022 05:29) (95% - 96%)    LABS:    CBC Full  -  ( 05 Apr 2022 06:30 )  WBC Count : 10.11 K/uL  RBC Count : 3.68 M/uL  Hemoglobin : 11.9 g/dL  Hematocrit : 36.2 %  Platelet Count - Automated : 161 K/uL  Mean Cell Volume : 98.4 fl  Mean Cell Hemoglobin : 32.3 pg  Mean Cell Hemoglobin Concentration : 32.9 gm/dL  Auto Neutrophil # : x  Auto Lymphocyte # : x  Auto Monocyte # : x  Auto Eosinophil # : x  Auto Basophil # : x  Auto Neutrophil % : x  Auto Lymphocyte % : x  Auto Monocyte % : x  Auto Eosinophil % : x  Auto Basophil % : x      04-05    138  |  105  |  8   ----------------------------<  88  3.9   |  29  |  0.52    Ca    9.0      05 Apr 2022 06:30      CAPILLARY BLOOD GLUCOSE      POCT Blood Glucose.: 87 mg/dL (05 Apr 2022 05:52)  POCT Blood Glucose.: 100 mg/dL (04 Apr 2022 23:26)  POCT Blood Glucose.: 106 mg/dL (04 Apr 2022 16:55)  POCT Blood Glucose.: 101 mg/dL (04 Apr 2022 11:25)          Creatinine Trend: 0.52<--, 0.56<--, 0.52<--, 0.78<--, 1.12<--, 1.19<--  I&O's Summary          Clean Catch Clean Catch (Midstream)  04-01 @ 02:07   >=3 organisms. Probable collection contamination.  --  --      .Blood Blood  03-31 @ 21:02   No growth to date.  --  --          MEDICATIONS:    MEDICATIONS  (STANDING):  cefepime   IVPB      cefepime   IVPB 1000 milliGRAM(s) IV Intermittent every 12 hours  chlorhexidine 2% Cloths 1 Application(s) Topical <User Schedule>  dextrose 5% + sodium chloride 0.9%. 1000 milliLiter(s) (75 mL/Hr) IV Continuous <Continuous>  dextrose 50% Injectable 25 Gram(s) IV Push once  glucagon  Injectable 1 milliGRAM(s) IntraMuscular once  insulin lispro (ADMELOG) corrective regimen sliding scale   SubCutaneous every 6 hours  levETIRAcetam  IVPB 500 milliGRAM(s) IV Intermittent every 12 hours  mupirocin 2% Ointment 1 Application(s) Both Nostrils two times a day  pantoprazole  Injectable 40 milliGRAM(s) IV Push daily  polyethylene glycol 3350 17 Gram(s) Oral two times a day  polyethylene glycol/electrolyte Solution. 4000 milliLiter(s) Oral once  senna 2 Tablet(s) Oral at bedtime      MEDICATIONS  (PRN):  acetaminophen     Tablet .. 650 milliGRAM(s) Oral every 6 hours PRN Temp greater or equal to 38C (100.4F), Mild Pain (1 - 3)  ALBUTerol    90 MICROgram(s) HFA Inhaler 2 Puff(s) Inhalation every 6 hours PRN Shortness of Breath and/or Wheezing  aluminum hydroxide/magnesium hydroxide/simethicone Suspension 30 milliLiter(s) Oral every 4 hours PRN Dyspepsia  melatonin 3 milliGRAM(s) Oral at bedtime PRN Insomnia  ondansetron Injectable 4 milliGRAM(s) IV Push every 8 hours PRN Nausea and/or Vomiting      REVIEW OF SYSTEMS:                           ALL ROS DONE [ X   ]    CONSTITUTIONAL:  LETHARGIC [   ], FEVER [   ], UNRESPONSIVE [   ]  CVS:  CP  [   ], SOB, [   ], PALPITATIONS [   ], DIZZYNESS [   ]  RS: COUGH [   ], SPUTUM [   ]  GI: ABDOMINAL PAIN [   ], NAUSEA [   ], VOMITINGS [   ], DIARRHEA [   ], CONSTIPATION [   ]  :  DYSURIA [   ], NOCTURIA [   ], INCREASED FREQUENCY [   ], DRIBLING [   ],  SKELETAL: PAINFUL JOINTS [   ], SWOLLEN JOINTS [   ], NECK ACHE [   ], LOW BACK ACHE [   ],  SKIN : ULCERS [   ], RASH [   ], ITCHING [   ]  CNS: HEAD ACHE [   ], DOUBLE VISION [   ], BLURRED VISION [   ], AMS / CONFUSION [   ], SEIZURES [   ], WEAKNESS [   ],TINGLING / NUMBNESS [   ]      PHYSICAL EXAMINATION:  GENERAL APPEARANCE: NO DISTRESS  HEENT:  NO PALLOR, NO  JVD,  NO   NODES, NECK SUPPLE  CVS: S1 +, S2 +,   RS: AEEB,  OCCASIONAL  RALES +,   NO RONCHI  ABD: SOFT, NT, NO, BS +  EXT: NO PE  SKIN: WARM,  PRESSURE ULCER+  SKELETAL:  ROM ACCEPTABLE  CNS:  AAO X 1    RADIOLOGY :    ACC: [id number] EXAM:  CT ANGIO ABD PELV (W)AW IC                          PROCEDURE DATE:  04/04/2022          INTERPRETATION:  CLINICAL INFORMATION: Abdominal aortic aneurysm without   rupture    COMPARISON: CT abdomen pelvis 3/31/2022    CONTRAST/COMPLICATIONS:  IV Contrast: Omnipaque 350  90 cc administered   10 cc discarded  Oral Contrast: NONE  Complications: None reported at time of study completion    PROCEDURE:  CT Angiography of the Abdomen and Pelvis.  Arterial phase images were acquired.  Sagittal and coronal reformats were performed as well as 3D (MIP)   reconstructions.    FINDINGS:  LOWER CHEST: Small bilateral pleural effusions and bibasilar atelectasis.    LIVER: Normal.  BILE DUCTS: Nondilated.  GALLBLADDER: Normal.  SPLEEN:Normal.  PANCREAS: Normal.  ADRENALS: Mild bilateral thickening.  KIDNEYS/URETERS: No calculi, hydronephrosis, or soft tissue attenuating   mass.  BLADDER: Normal.  REPRODUCTIVE ORGANS: Nonenlarged.    BOWEL: Persistent left-sided colitis, improved. No obstruction.  PERITONEUM: No free air or ascites.    VESSELS: Fusiform infrarenal abdominal aortic aneurysm measures 6.7 x 6.4   cm, previously 6.3 x 6.2 cm in 12/31/2020. There is increase in eccentric   mural thrombus. Angulated neck morphology with approximately 1.7 cm   distance to the lowest left renal artery.    Mild bilateral iliac tortuosity. Widely patent iliac runoff to the   bilateral proximal femorals. 2.0 cm bilateral common iliac artery   ectasia. Celiac artery is widely patent. Replaced left hepatic artery   arising from the left gastric artery. SMA is widely patent. DANIELLA is   patent. Both renal arteries are single and normal caliber patent.    RETROPERITONEUM/LYMPH NODES: No adenopathy or hematoma.  ABDOMINAL WALL: Normal.  BONES: No aggressive lesion.    IMPRESSION:  *  Fusiform infrarenal abdominal aortic aneurysm measures 6.7 x 6.4 cm,   previously 6.3 x 6.2 cm in 12/31/2020.  *  Improving left-sided colitis.      ASSESSMENT :     Gastrointestinal hemorrhage    Yes    No pertinent past medical history    Dementia    Seizure    Anemia    Depression    HTN (hypertension)    Osteoarthritis    Osteoporosis    Diabetes mellitus    Seizure    T2DM (type 2 diabetes mellitus)    Seizures    Vitamin D deficiency    HTN (hypertension)    History of atherosclerosis    PVD (peripheral vascular disease)    Vascular dementia    Chronic obstructive pulmonary disease, unspecified COPD type    DM (diabetes mellitus)    No significant past surgical history        PLAN:  HPI:  Patient is a 87 y/o male, baseline AA0x1 and minimally ambulatory, with significant medical history of HTN, T2DM, PVOD, COPD, AAA (5.2cm), Tuberculosis, Seizure disorder, Anxiety, Vascular Dementia, Prior SAH/SDH, Iron Deficiency Anemia, Hypothyroidism, and Urinary Incontinence presented from [facility] with complaints of melena. The patient was very altered while in the ED and unable to provide any history or ROS; collateral history was obtained from facility records and Sister (Mrs. Candelaria Martin), and niece (Mrs. Mercy Moon). They reported that they had just found out about the melena today, and that he has no prior history of GIB in the past. As per med rec from facility, patient is not on ASA, or any blood thinners. Patient  was noted to be hypotensive to 60/38, , temp 98.7, and saturating 96% on RA. Initial labs showed a lactate of 6.1, WBC of 57, and Hb of 12.7; Pt was transfused 2U PRBC in and given 3L isotonic fluids upon which his BP improved. ICU was consulted initially for septic shock +/- acute GIB but as patient was fluid responsive and regained some mental status, he was found to be stable enough to go to general floor for further workup.  (31 Mar 2022 19:25)    # PROGNOSIS IS GUARDED, CASE DISCUSSED AT LENGTH WITH SISTER NORMAL MARTIN AND NIECE MISS MERCY MOON @   [phone] - ALL QUESTIONS ANSWERED    # HYPOTENSION SUSPECTED SEPSIS S/T PNEUMONIA AND COLITIS + HYPOVOLEMIA  # ?MELENA, UNDERLYING NALLELY - ACUTE ON CHRONIC ANEMIA  # CONSTIPATION, OBSTIPATION - RESOLVING  - PLACED ON BROADSPECTRUM ABX - CEFEPIME, F/U BCX AND UCX  - REVIEWED CT CHEST/ABD/PELVIS  - C. DIFF NEGATIVE  - PLACED ON ISOLATION  - PLACED ON PPI DRIP, ADVANCING DIET AS TOLERATED, IVF  - S/P PRBC TRANSFUSION, TRANSFUSION THRESHOLD HGB < 7  - S/P CRITICAL CARE CONSULT, GASTROENTEROLOGY CONSULT, ID CONSULT    # LEUKOCYTOSIS - LIKELY LEUKEMOID REACTION - IMPROVING  - HEME/ONC CONSULT IN PROGRESS    # ARCHANA - IMPROVING - PLACED ON IVF, REVIEWED UA, MONITOR CR, AVOID NEPHROTOXIC AGENTS    # ELEVATED LACTIC ACID - TRENDING LACTIC ACID    # HYPOKALEMIA - REPLETING WITH SUPPLEMENT    # HYPERCALCEMIA - SUSPECT S/T DEHYDRATION - F/U IONIZED CA, F/U PTH, F/U PTHRP, F/U VIT D    # ACUTE ENCEPHALOPATHY ON ADVANCED VASCULAR DEMENTIA, HX OF SAH/SDH - PATIENT IMPROVED TO ? BASELINE AFTER IVF AND PRBC    # AAA - VASCULAR SX CONSULT PLACED  - CTA AORTA ORDERED TO EVALUATE AFTER EXTENSIVE DISCUSSION WITH FAMILY NIECE MISS MADRID [place] @   [phone] ; VASCULAR TEAM ALSO D/W NIECE    # PRESSURE ULCER - WOUND CARE, PATIENT IS HIGH RISK FOR PRESSURE ULCERS DESPITE PREVENTIVE MEASURES IN PLACE    # SUSPECT SEVERE PROTEIN CALORIE NUTRITION - NUTRITIONAL SUPPLEMENT    # URINARY INCONTINENCE    # HTN  # T2DM  # PVOD  # COPD    # HX OF TB  # SEIZURE DISORDER  # HYPOTHYROIDISM  # GI PPX   Patient is a 86y old  Male who presents with a chief complaint of Encephalopathy, Hypotension (04 Apr 2022 18:23)    PATIENT IS SEEN AND EXAMINED IN MEDICAL FLOOR.  NGT [    ]    BLANCA [   ]      GT [   ]    ALLERGIES:  No Known Allergies      Daily     Daily     VITALS:    Vital Signs Last 24 Hrs  T(C): 36.7 (05 Apr 2022 05:29), Max: 37.2 (04 Apr 2022 21:04)  T(F): 98.1 (05 Apr 2022 05:29), Max: 98.9 (04 Apr 2022 21:04)  HR: 77 (05 Apr 2022 05:29) (72 - 77)  BP: 117/57 (05 Apr 2022 05:29) (117/57 - 128/70)  BP(mean): --  RR: 17 (05 Apr 2022 05:29) (17 - 18)  SpO2: 95% (05 Apr 2022 05:29) (95% - 96%)    LABS:    CBC Full  -  ( 05 Apr 2022 06:30 )  WBC Count : 10.11 K/uL  RBC Count : 3.68 M/uL  Hemoglobin : 11.9 g/dL  Hematocrit : 36.2 %  Platelet Count - Automated : 161 K/uL  Mean Cell Volume : 98.4 fl  Mean Cell Hemoglobin : 32.3 pg  Mean Cell Hemoglobin Concentration : 32.9 gm/dL  Auto Neutrophil # : x  Auto Lymphocyte # : x  Auto Monocyte # : x  Auto Eosinophil # : x  Auto Basophil # : x  Auto Neutrophil % : x  Auto Lymphocyte % : x  Auto Monocyte % : x  Auto Eosinophil % : x  Auto Basophil % : x      04-05    138  |  105  |  8   ----------------------------<  88  3.9   |  29  |  0.52    Ca    9.0      05 Apr 2022 06:30      CAPILLARY BLOOD GLUCOSE      POCT Blood Glucose.: 87 mg/dL (05 Apr 2022 05:52)  POCT Blood Glucose.: 100 mg/dL (04 Apr 2022 23:26)  POCT Blood Glucose.: 106 mg/dL (04 Apr 2022 16:55)  POCT Blood Glucose.: 101 mg/dL (04 Apr 2022 11:25)          Creatinine Trend: 0.52<--, 0.56<--, 0.52<--, 0.78<--, 1.12<--, 1.19<--  I&O's Summary          Clean Catch Clean Catch (Midstream)  04-01 @ 02:07   >=3 organisms. Probable collection contamination.  --  --      .Blood Blood  03-31 @ 21:02   No growth to date.  --  --          MEDICATIONS:    MEDICATIONS  (STANDING):  cefepime   IVPB      cefepime   IVPB 1000 milliGRAM(s) IV Intermittent every 12 hours  chlorhexidine 2% Cloths 1 Application(s) Topical <User Schedule>  dextrose 5% + sodium chloride 0.9%. 1000 milliLiter(s) (75 mL/Hr) IV Continuous <Continuous>  dextrose 50% Injectable 25 Gram(s) IV Push once  glucagon  Injectable 1 milliGRAM(s) IntraMuscular once  insulin lispro (ADMELOG) corrective regimen sliding scale   SubCutaneous every 6 hours  levETIRAcetam  IVPB 500 milliGRAM(s) IV Intermittent every 12 hours  mupirocin 2% Ointment 1 Application(s) Both Nostrils two times a day  pantoprazole  Injectable 40 milliGRAM(s) IV Push daily  polyethylene glycol 3350 17 Gram(s) Oral two times a day  polyethylene glycol/electrolyte Solution. 4000 milliLiter(s) Oral once  senna 2 Tablet(s) Oral at bedtime      MEDICATIONS  (PRN):  acetaminophen     Tablet .. 650 milliGRAM(s) Oral every 6 hours PRN Temp greater or equal to 38C (100.4F), Mild Pain (1 - 3)  ALBUTerol    90 MICROgram(s) HFA Inhaler 2 Puff(s) Inhalation every 6 hours PRN Shortness of Breath and/or Wheezing  aluminum hydroxide/magnesium hydroxide/simethicone Suspension 30 milliLiter(s) Oral every 4 hours PRN Dyspepsia  melatonin 3 milliGRAM(s) Oral at bedtime PRN Insomnia  ondansetron Injectable 4 milliGRAM(s) IV Push every 8 hours PRN Nausea and/or Vomiting      REVIEW OF SYSTEMS:                           ALL ROS DONE [ X   ]    CONSTITUTIONAL:  LETHARGIC [   ], FEVER [   ], UNRESPONSIVE [   ]  CVS:  CP  [   ], SOB, [   ], PALPITATIONS [   ], DIZZYNESS [   ]  RS: COUGH [   ], SPUTUM [   ]  GI: ABDOMINAL PAIN [   ], NAUSEA [   ], VOMITINGS [   ], DIARRHEA [   ], CONSTIPATION [   ]  :  DYSURIA [   ], NOCTURIA [   ], INCREASED FREQUENCY [   ], DRIBLING [   ],  SKELETAL: PAINFUL JOINTS [   ], SWOLLEN JOINTS [   ], NECK ACHE [   ], LOW BACK ACHE [   ],  SKIN : ULCERS [   ], RASH [   ], ITCHING [   ]  CNS: HEAD ACHE [   ], DOUBLE VISION [   ], BLURRED VISION [   ], AMS / CONFUSION [   ], SEIZURES [   ], WEAKNESS [   ],TINGLING / NUMBNESS [   ]      PHYSICAL EXAMINATION:  GENERAL APPEARANCE: NO DISTRESS  HEENT:  NO PALLOR, NO  JVD,  NO   NODES, NECK SUPPLE  CVS: S1 +, S2 +,   RS: AEEB,  OCCASIONAL  RALES +,   NO RONCHI  ABD: SOFT, NT, NO, BS +  EXT: NO PE  SKIN: WARM,  PRESSURE ULCER+  SKELETAL:  ROM ACCEPTABLE  CNS:  AAO X 1    RADIOLOGY :    ACC: [id number] EXAM:  CT ANGIO ABD PELV (W)AW IC                          PROCEDURE DATE:  04/04/2022          INTERPRETATION:  CLINICAL INFORMATION: Abdominal aortic aneurysm without   rupture    COMPARISON: CT abdomen pelvis 3/31/2022    CONTRAST/COMPLICATIONS:  IV Contrast: Omnipaque 350  90 cc administered   10 cc discarded  Oral Contrast: NONE  Complications: None reported at time of study completion    PROCEDURE:  CT Angiography of the Abdomen and Pelvis.  Arterial phase images were acquired.  Sagittal and coronal reformats were performed as well as 3D (MIP)   reconstructions.    FINDINGS:  LOWER CHEST: Small bilateral pleural effusions and bibasilar atelectasis.    LIVER: Normal.  BILE DUCTS: Nondilated.  GALLBLADDER: Normal.  SPLEEN:Normal.  PANCREAS: Normal.  ADRENALS: Mild bilateral thickening.  KIDNEYS/URETERS: No calculi, hydronephrosis, or soft tissue attenuating   mass.  BLADDER: Normal.  REPRODUCTIVE ORGANS: Nonenlarged.    BOWEL: Persistent left-sided colitis, improved. No obstruction.  PERITONEUM: No free air or ascites.    VESSELS: Fusiform infrarenal abdominal aortic aneurysm measures 6.7 x 6.4   cm, previously 6.3 x 6.2 cm in 12/31/2020. There is increase in eccentric   mural thrombus. Angulated neck morphology with approximately 1.7 cm   distance to the lowest left renal artery.    Mild bilateral iliac tortuosity. Widely patent iliac runoff to the   bilateral proximal femorals. 2.0 cm bilateral common iliac artery   ectasia. Celiac artery is widely patent. Replaced left hepatic artery   arising from the left gastric artery. SMA is widely patent. DANIELLA is   patent. Both renal arteries are single and normal caliber patent.    RETROPERITONEUM/LYMPH NODES: No adenopathy or hematoma.  ABDOMINAL WALL: Normal.  BONES: No aggressive lesion.    IMPRESSION:  *  Fusiform infrarenal abdominal aortic aneurysm measures 6.7 x 6.4 cm,   previously 6.3 x 6.2 cm in 12/31/2020.  *  Improving left-sided colitis.      ASSESSMENT :     Gastrointestinal hemorrhage    Yes    No pertinent past medical history    Dementia    Seizure    Anemia    Depression    HTN (hypertension)    Osteoarthritis    Osteoporosis    Diabetes mellitus    Seizure    T2DM (type 2 diabetes mellitus)    Seizures    Vitamin D deficiency    HTN (hypertension)    History of atherosclerosis    PVD (peripheral vascular disease)    Vascular dementia    Chronic obstructive pulmonary disease, unspecified COPD type    DM (diabetes mellitus)    No significant past surgical history        PLAN:  HPI:  Patient is a 87 y/o male, baseline AA0x1 and minimally ambulatory, with significant medical history of HTN, T2DM, PVOD, COPD, AAA (5.2cm), Tuberculosis, Seizure disorder, Anxiety, Vascular Dementia, Prior SAH/SDH, Iron Deficiency Anemia, Hypothyroidism, and Urinary Incontinence presented from [facility] with complaints of melena. The patient was very altered while in the ED and unable to provide any history or ROS; collateral history was obtained from facility records and Sister (Mrs. Candelaria Martin), and niece (Mrs. Mercy Moon). They reported that they had just found out about the melena today, and that he has no prior history of GIB in the past. As per med rec from facility, patient is not on ASA, or any blood thinners. Patient  was noted to be hypotensive to 60/38, , temp 98.7, and saturating 96% on RA. Initial labs showed a lactate of 6.1, WBC of 57, and Hb of 12.7; Pt was transfused 2U PRBC in and given 3L isotonic fluids upon which his BP improved. ICU was consulted initially for septic shock +/- acute GIB but as patient was fluid responsive and regained some mental status, he was found to be stable enough to go to general floor for further workup.  (31 Mar 2022 19:25)    # D/C IN A.M. TO [facility], IF MEDICALLY STABLE    # PROGNOSIS IS GUARDED, CASE DISCUSSED AT LENGTH WITH SISTER NORMAL MARTIN AND NIECE MISS MERCY MOON @    - ALL QUESTIONS ANSWERED [4/2]  # PALLIATIVE CARE CONSULTED - CASE D/W FAMILY BY PALLIATIVE CARE. FAMILY CONFIRMED GOC OF DNR/DNI AND EXPRESSED THEY DO NOT WISH TO PURSUE INTERVENTION FOR AAA AFTER DISCUSSION W/ PALLIATIVE CARE TEAM    # HYPOTENSION SUSPECTED SEPSIS S/T PNEUMONIA AND COLITIS + HYPOVOLEMIA - RESOLVING  # ?MELENA, UNDERLYING NALLELY - ACUTE ON CHRONIC ANEMIA  # CONSTIPATION, OBSTIPATION - RESOLVING  - PLACED ON BROADSPECTRUM ABX - CEFEPIME, BCX [NGTD] AND UCX [NGTD]  - REVIEWED CT CHEST/ABD/PELVIS  - C. DIFF NEGATIVE  - PLACED ON ISOLATION  - PLACED ON PPI DRIP, ADVANCING DIET AS TOLERATED, IVF  - S/P PRBC TRANSFUSION, TRANSFUSION THRESHOLD HGB < 7  - S/P CRITICAL CARE CONSULT, GASTROENTEROLOGY CONSULT, ID CONSULT    - S/P MAG. CITRATE, S/P GOLYTELY, S/P ENEMA    # LEUKOCYTOSIS - LIKELY LEUKEMOID REACTION - IMPROVING  - HEME/ONC CONSULT IN PROGRESS    # ARCHANA - IMPROVING - PLACED ON IVF, REVIEWED UA, MONITOR CR, AVOID NEPHROTOXIC AGENTS    # ELEVATED LACTIC ACID - TRENDING LACTIC ACID    # HYPOKALEMIA - REPLETING WITH SUPPLEMENT    # HYPERCALCEMIA - SUSPECT S/T DEHYDRATION - F/U IONIZED CA, F/U PTH, F/U PTHRP, F/U VIT D    # ACUTE ENCEPHALOPATHY ON ADVANCED VASCULAR DEMENTIA, HX OF SAH/SDH - PATIENT IMPROVED TO ? BASELINE AFTER IVF AND PRBC    # AAA - VASCULAR SX CONSULT PLACED  - CTA AORTA ORDERED TO EVALUATE AFTER EXTENSIVE DISCUSSION WITH FAMILY NIECE MISS MERCY MOON @   [phone] ; VASCULAR TEAM ALSO D/W NIECE    # PRESSURE ULCER - WOUND CARE, PATIENT IS HIGH RISK FOR PRESSURE ULCERS DESPITE PREVENTIVE MEASURES IN PLACE    # SUSPECT SEVERE PROTEIN CALORIE NUTRITION - NUTRITIONAL SUPPLEMENT    # URINARY INCONTINENCE    # HTN  # T2DM  # PVOD  # COPD    # HX OF TB  # SEIZURE DISORDER  # HYPOTHYROIDISM  # GI PPX

## 2022-04-05 NOTE — PROGRESS NOTE ADULT - PROBLEM SELECTOR PLAN 11
patient from [facility]   pending palliative F/U   will return back to facility to finish ABX until 4/7  COVID - 4/5

## 2022-04-05 NOTE — CONSULT NOTE ADULT - CONSULT REQUESTED DATE/TIME
01-Apr-2022 18:07
01-Apr-2022 19:48
31-Mar-2022 17:41
05-Apr-2022 15:16
01-Apr-2022 18:48
01-Apr-2022 08:23

## 2022-04-05 NOTE — CONSULT NOTE ADULT - NS ATTEND OPT1 GEN_ALL_CORE
I independently performed the documented:
I attest my time as attending is greater than 50% of the total combined time spent on qualifying patient care activities by the PA/NP and attending.

## 2022-04-05 NOTE — CONSULT NOTE ADULT - REASON FOR ADMISSION
Encephalopathy, Hypotension
arturo
Encephalopathy, Hypotension

## 2022-04-05 NOTE — PROGRESS NOTE ADULT - NS ATTEND OPT1 GEN_ALL_CORE
I attest my time as attending is greater than 50% of the total combined time spent on qualifying patient care activities by the PA/NP and attending.
I independently performed the documented:

## 2022-04-05 NOTE — CONSULT NOTE ADULT - NS ATTEND AMEND GEN_ALL_CORE FT
Seen ex'd dw'ed PA  Known AAA- previously deemed not s surg candidate.    Pt w low functional status and MS.    Comfortable.  Abd: Palp enlarged aorta.  LE: warm, no edema.  +fem/pop/DP pulses.  Pops widened.  Poor hygiene feet/toes.    CT reviewed: Athero, Large AAA.    A/P:   AAA, large, asymp.    Poss pop aneurysms. Asymp  Reportedly assigned to conserv mgmt.    Rec:   Med mgmt  Please reconsult if GOC change.  Advise direct relatives to screen for aneurysms.
86 y M with multiple comorbidities incl advanced vascular dementia, min verbal, nonambulatory adm for sepsis, PNA/colitis, noted to have inc AAA. Vascular eval noted. Family does not want surgical intervention. Pt min verbal, confused, nonambulatory, cachectic, NAD. DNR/DNI. Hospice eligible.  Family to discuss at a later time as sister's son just hospitalized w critical illness. To return to [facility] once medically stable. Palliative will follow.

## 2022-04-05 NOTE — CONSULT NOTE ADULT - PROBLEM SELECTOR RECOMMENDATION 9
Hx of AAA.    CT abd/pelvis results noted , shows 6.4 x6.2 cm infrarenal abd aortic aneurysm, previously 6.1 cm x 6.1cm on 12/31/2020.  Per vascular pt is poor surgical candidate.    Farhana Wharton agreed not to pursue surgery.  Pt is DNR/DNI

## 2022-04-05 NOTE — CONSULT NOTE ADULT - PROBLEM SELECTOR RECOMMENDATION 2
Pt AOX1, non ambulatory.  Total care. No behavior issues at the time of exam. FAST 7c.  Hospice appropriate.  Discussed dementia trajectory with the pt's niece Akiko and provided anticipatory guidance.   Educated her about hospice philosophy and, explained he is appropriate for hospice at [facility]    Pt is DNR/DNI

## 2022-04-05 NOTE — CONSULT NOTE ADULT - PROBLEM SELECTOR RECOMMENDATION 3
Clinical evidence indicates that the patient has Severe protein calorie malnutrition/ 3rd degree. Albumin 2.6    In context of  Chronic Illness (>1 month)    Energy/Food intake <50% of estimated energy requirement >5 days  Weight loss: Moderate - severe   Body Fat loss: Severe   Cachexia, temporal wasting, contracted, muscle atrophy  Muscle mass loss: Severe    Fluid Accumulation: Severe (Fluid overload, ascites, pleural effusions)   Strength: weakened severe     Diet as tolerated.

## 2022-04-05 NOTE — PROGRESS NOTE ADULT - SUBJECTIVE AND OBJECTIVE BOX
86y Male is under our care for     REVIEW OF SYSTEMS:  [  ] Not able to elicit  General:	  Chest:	  GI:	  :  Skin:	  Musculoskeletal:	  Neuro:	    MEDS:  cefepime   IVPB      cefepime   IVPB 1000 milliGRAM(s) IV Intermittent every 12 hours    ALLERGIES: Allergies    No Known Allergies    Intolerances        VITALS:  Vital Signs Last 24 Hrs  T(C): 36.7 (05 Apr 2022 05:29), Max: 37.2 (04 Apr 2022 21:04)  T(F): 98.1 (05 Apr 2022 05:29), Max: 98.9 (04 Apr 2022 21:04)  HR: 77 (05 Apr 2022 05:29) (72 - 77)  BP: 117/57 (05 Apr 2022 05:29) (117/57 - 128/70)  BP(mean): --  RR: 17 (05 Apr 2022 05:29) (17 - 18)  SpO2: 95% (05 Apr 2022 05:29) (95% - 96%)      PHYSICAL EXAM:  HEENT:  Neck:  Respiratory:  Cardiovascular:  Gastrointestinal:  Extremities:  Skin:  Ortho:  Neuro:    LABS/DIAGNOSTIC TESTS:                        11.9   10.11 )-----------( 161      ( 05 Apr 2022 06:30 )             36.2     WBC Count: 10.11 K/uL (04-05 @ 06:30)  WBC Count: 7.75 K/uL (04-04 @ 07:19)  WBC Count: 10.88 K/uL (04-03 @ 05:54)  WBC Count: 23.08 K/uL (04-02 @ 06:08)  WBC Count: 34.83 K/uL (04-01 @ 05:43)    04-05    138  |  105  |  8   ----------------------------<  88  3.9   |  29  |  0.52    Ca    9.0      05 Apr 2022 06:30        CULTURES:   Clean Catch Clean Catch (Midstream)  04-01 @ 02:07   >=3 organisms. Probable collection contamination.  --  --      .Blood Blood  03-31 @ 21:02   No growth to date.  --  --        RADIOLOGY:  no new studies 86y Male is under our care for pneumonia.  Patient was seen laying comfortably in bed with no acute distress.  Patient remains afebrile and WBC count is WNL.    REVIEW OF SYSTEMS:  [ x ] Not able to elicit      MEDS:  cefepime   IVPB      cefepime   IVPB 1000 milliGRAM(s) IV Intermittent every 12 hours    ALLERGIES: Allergies    No Known Allergies    Intolerances        VITALS:  Vital Signs Last 24 Hrs  T(C): 36.7 (05 Apr 2022 05:29), Max: 37.2 (04 Apr 2022 21:04)  T(F): 98.1 (05 Apr 2022 05:29), Max: 98.9 (04 Apr 2022 21:04)  HR: 77 (05 Apr 2022 05:29) (72 - 77)  BP: 117/57 (05 Apr 2022 05:29) (117/57 - 128/70)  BP(mean): --  RR: 17 (05 Apr 2022 05:29) (17 - 18)  SpO2: 95% (05 Apr 2022 05:29) (95% - 96%)      PHYSICAL EXAM:  HEENT: n/a  Neck: supple no LN's   Respiratory: bilateral rales  Cardiovascular: S1 S2 reg no murmurs  Gastrointestinal: +BS with soft, nondistended abdomen; nontender  Extremities: no edema  Skin: no rashes  Ortho: n/a  Neuro: AAO x 1       LABS/DIAGNOSTIC TESTS:                        11.9   10.11 )-----------( 161      ( 05 Apr 2022 06:30 )             36.2     WBC Count: 10.11 K/uL (04-05 @ 06:30)  WBC Count: 7.75 K/uL (04-04 @ 07:19)  WBC Count: 10.88 K/uL (04-03 @ 05:54)  WBC Count: 23.08 K/uL (04-02 @ 06:08)  WBC Count: 34.83 K/uL (04-01 @ 05:43)    04-05    138  |  105  |  8   ----------------------------<  88  3.9   |  29  |  0.52    Ca    9.0      05 Apr 2022 06:30        CULTURES:   Clean Catch Clean Catch (Midstream)  04-01 @ 02:07   >=3 organisms. Probable collection contamination.  --  --      .Blood Blood  03-31 @ 21:02   No growth to date.  --  --        RADIOLOGY:  no new studies

## 2022-04-05 NOTE — PROGRESS NOTE ADULT - SUBJECTIVE AND OBJECTIVE BOX
NP Note discussed with  primary attending    Patient is a 86y old  Male who presents with a chief complaint of Encephalopathy, Hypotension (05 Apr 2022 15:07)      INTERVAL HPI/OVERNIGHT EVENTS: no acute medical complaints    MEDICATIONS  (STANDING):  cefepime   IVPB      cefepime   IVPB 1000 milliGRAM(s) IV Intermittent every 12 hours  chlorhexidine 2% Cloths 1 Application(s) Topical <User Schedule>  dextrose 5% + sodium chloride 0.9%. 1000 milliLiter(s) (75 mL/Hr) IV Continuous <Continuous>  dextrose 50% Injectable 25 Gram(s) IV Push once  glucagon  Injectable 1 milliGRAM(s) IntraMuscular once  insulin lispro (ADMELOG) corrective regimen sliding scale   SubCutaneous every 6 hours  levETIRAcetam  IVPB 500 milliGRAM(s) IV Intermittent every 12 hours  mupirocin 2% Ointment 1 Application(s) Both Nostrils two times a day  pantoprazole  Injectable 40 milliGRAM(s) IV Push daily  polyethylene glycol 3350 17 Gram(s) Oral two times a day  polyethylene glycol/electrolyte Solution. 4000 milliLiter(s) Oral once  senna 2 Tablet(s) Oral at bedtime    MEDICATIONS  (PRN):  acetaminophen     Tablet .. 650 milliGRAM(s) Oral every 6 hours PRN Temp greater or equal to 38C (100.4F), Mild Pain (1 - 3)  ALBUTerol    90 MICROgram(s) HFA Inhaler 2 Puff(s) Inhalation every 6 hours PRN Shortness of Breath and/or Wheezing  aluminum hydroxide/magnesium hydroxide/simethicone Suspension 30 milliLiter(s) Oral every 4 hours PRN Dyspepsia  melatonin 3 milliGRAM(s) Oral at bedtime PRN Insomnia  ondansetron Injectable 4 milliGRAM(s) IV Push every 8 hours PRN Nausea and/or Vomiting      __________________________________________________  REVIEW OF SYSTEMS:  limited due to mental status       Vital Signs Last 24 Hrs  T(C): 36.6 (05 Apr 2022 12:47), Max: 37.2 (04 Apr 2022 21:04)  T(F): 97.9 (05 Apr 2022 12:47), Max: 98.9 (04 Apr 2022 21:04)  HR: 78 (05 Apr 2022 12:47) (72 - 78)  BP: 108/56 (05 Apr 2022 12:47) (108/56 - 127/67)  BP(mean): --  RR: 17 (05 Apr 2022 12:47) (17 - 18)  SpO2: 95% (05 Apr 2022 12:47) (95% - 96%)    ________________________________________________  PHYSICAL EXAM:  GENERAL: cachectic, temporal wasting, NAD  HEAD:  Atraumatic, Normocephalic  EYES: EOMI, PERRLA, conjunctiva and sclera clear  ENMT: No tonsillar erythema, exudates, or enlargement; Moist mucous membranes, Good dentition, No lesions  NECK: Supple, No JVD, Normal thyroid  NERVOUS SYSTEM:  Awake/Alert . Follows few simple commands. Moves all extremities. No new neuro deficits.   CHEST/LUNG: Clear upper airway, decreased to bases . No rales, rhonchi, wheezing, or rubs  HEART: Regular rate and rhythm; No murmurs, rubs, or gallops  ABDOMEN: Soft, Nontender, Nondistended; Bowel sounds present. No pulsation noted.   EXTREMITIES:  Muscle wasting. 2+ Peripheral Pulses, No clubbing, cyanosis, or edema  LYMPH: No lymphadenopathy noted  SKIN: Stage 1 Pressure Injury to the Coccyx and Bilateral Heels  _________________________________________________  LABS:                        11.9   10.11 )-----------( 161      ( 05 Apr 2022 06:30 )             36.2     04-05    138  |  105  |  8   ----------------------------<  88  3.9   |  29  |  0.52    Ca    9.0      05 Apr 2022 06:30          CAPILLARY BLOOD GLUCOSE      POCT Blood Glucose.: 114 mg/dL (05 Apr 2022 11:33)  POCT Blood Glucose.: 87 mg/dL (05 Apr 2022 05:52)  POCT Blood Glucose.: 100 mg/dL (04 Apr 2022 23:26)  POCT Blood Glucose.: 106 mg/dL (04 Apr 2022 16:55)        RADIOLOGY & ADDITIONAL TESTS:   < from: CT Angio Abdomen and Pelvis w/ IV Cont (04.04.22 @ 14:51) >    IMPRESSION:  *  Fusiform infrarenal abdominal aortic aneurysm measures 6.7 x 6.4 cm,   previously 6.3 x 6.2 cm in 12/31/2020.  *  Improving left-sided colitis.    < end of copied text >  < from: CT Abdomen and Pelvis No Cont (03.31.22 @ 21:03) >  IMPRESSION:    Noncontrast study.    CHEST:  1. Few groundglass opacities and clustered/tree-in-bud nodularity of the   right upper lobe, and to a lesser extentthe right middle lobe, may be   infectious or inflammatory.  2. Coronary artery calcifications versus stenting. Correlate with   procedural history.    ABDOMEN/PELVIS:  1. Pan-proctocolitis. Large fecal load of rectum.  2. Redemonstrated 6.4 x 6.2 cminfrarenal abdominal aortic aneurysm,   previously 6.1 x 6.1 cm on 12/31/2020.    --- End of Report ---    < end of copied text >  < from: CT Chest No Cont (03.31.22 @ 21:02) >    IMPRESSION:    Noncontrast study.    CHEST:  1. Few groundglass opacities and clustered/tree-in-bud nodularity of the   right upper lobe, and to a lesser extentthe right middle lobe, may be   infectious or inflammatory.  2. Coronary artery calcifications versus stenting. Correlate with   procedural history.    ABDOMEN/PELVIS:  1. Pan-proctocolitis. Large fecal load of rectum.  2. Redemonstrated 6.4 x 6.2 cminfrarenal abdominal aortic aneurysm,   previously 6.1 x 6.1 cm on 12/31/2020.    --- End of Report ---    < end of copied text >    Imaging Personally Reviewed:  YES    Consultant(s) Notes Reviewed:   YES    Care Discussed with Consultants : Palliative    Plan of care was discussed with patient and /or primary care giver; all questions and concerns were addressed and care was aligned with patient's wishes.

## 2022-04-05 NOTE — CONSULT NOTE ADULT - SUBJECTIVE AND OBJECTIVE BOX
[facility] Geriatric and Palliative Consult Service:  Dr. Perla Alanis: cell ([phone])  Dr. Dianne Thompson: cell ([phone])   Mary Foss NP: cell ([phone])  Thania Conroy NP: cell ([phone])   Eric Devonte LSW: cell ([phone])     HPI:  Patient is a 87 y/o male, baseline AA0x1 and minimally ambulatory, with significant medical history of HTN, T2DM, PVOD, COPD, AAA (5.2cm), Tuberculosis, Seizure disorder, Anxiety, Vascular Dementia, Prior SAH/SDH, Iron Deficiency Anemia, Hypothyroidism, and Urinary Incontinence presented from [facility] with complaints of melena. The patient was very altered while in the ED and unable to provide any history or ROS; collateral history was obtained from facility records and Sister (Mrs. Candelaria Martin), and niece (Mrs. Viviana Moon). They reported that they had just found out about the melena today, and that he has no prior history of GIB in the past. As per med rec from facility, patient is not on ASA, or any blood thinners. Patient  was noted to be hypotensive to 60/38, , temp 98.7, and saturating 96% on RA. Initial labs showed a lactate of 6.1, WBC of 57, and Hb of 12.7; Pt was transfused 2U PRBC in and given 3L isotonic fluids upon which his BP improved. ICU was consulted initially for septic shock +/- acute GIB but as patient was fluid responsive and regained some mental status, he was found to be stable enough to go to general floor for further workup.    Interval hx: Pt is AOX1, confused, minimally verbal.  NAD.   # [id number] facilitated exam.        PAST MEDICAL & SURGICAL HISTORY:  Dementia    Seizure    Anemia    Depression    HTN (hypertension)    Osteoarthritis    Osteoporosis    Diabetes mellitus    Seizure    T2DM (type 2 diabetes mellitus)    Seizures    Vitamin D deficiency    HTN (hypertension)    History of atherosclerosis    PVD (peripheral vascular disease)    Vascular dementia    Chronic obstructive pulmonary disease, unspecified COPD type    DM (diabetes mellitus)    No significant past surgical history        SOCIAL HISTORY:    Admitted from:  [facility], [facility]  Pt has children with whom he is estranged.  His niece Akiko Moon is the decision maker     Substance abuse history:  none            Tobacco hx: none                 Alcohol hx: none             Home Opioid hx: none  Spiritism: Faith                                   Preferred Language: American    Akiko Moon (surrogate)   Phone# [phone]  Candelaria Martin   (sister)               Phone# [phone]     FAMILY HISTORY:  Patient&#x27;s mother is      unable to obtain from patient due to poor mentation  Baseline ADLs (prior to admission): bedbound    Allergies    No Known Allergies    Intolerances      Present Symptoms:    Unable to obtain due to poor mentation    MEDICATIONS  (STANDING):  cefepime   IVPB      cefepime   IVPB 1000 milliGRAM(s) IV Intermittent every 12 hours  chlorhexidine 2% Cloths 1 Application(s) Topical <User Schedule>  dextrose 5% + sodium chloride 0.9%. 1000 milliLiter(s) (75 mL/Hr) IV Continuous <Continuous>  dextrose 50% Injectable 25 Gram(s) IV Push once  glucagon  Injectable 1 milliGRAM(s) IntraMuscular once  insulin lispro (ADMELOG) corrective regimen sliding scale   SubCutaneous every 6 hours  levETIRAcetam  IVPB 500 milliGRAM(s) IV Intermittent every 12 hours  mupirocin 2% Ointment 1 Application(s) Both Nostrils two times a day  pantoprazole  Injectable 40 milliGRAM(s) IV Push daily  polyethylene glycol 3350 17 Gram(s) Oral two times a day  polyethylene glycol/electrolyte Solution. 4000 milliLiter(s) Oral once  senna 2 Tablet(s) Oral at bedtime    MEDICATIONS  (PRN):  acetaminophen     Tablet .. 650 milliGRAM(s) Oral every 6 hours PRN Temp greater or equal to 38C (100.4F), Mild Pain (1 - 3)  ALBUTerol    90 MICROgram(s) HFA Inhaler 2 Puff(s) Inhalation every 6 hours PRN Shortness of Breath and/or Wheezing  aluminum hydroxide/magnesium hydroxide/simethicone Suspension 30 milliLiter(s) Oral every 4 hours PRN Dyspepsia  melatonin 3 milliGRAM(s) Oral at bedtime PRN Insomnia  ondansetron Injectable 4 milliGRAM(s) IV Push every 8 hours PRN Nausea and/or Vomiting      PHYSICAL EXAM:  Vital Signs Last 24 Hrs  T(C): 36.6 (2022 12:47), Max: 37.2 (2022 21:04)  T(F): 97.9 (2022 12:47), Max: 98.9 (2022 21:04)  HR: 78 (2022 12:47) (72 - 78)  BP: 108/56 (2022 12:47) (108/56 - 127/67)  BP(mean): --  RR: 17 (2022 12:47) (17 - 18)  SpO2: 95% (2022 12:47) (95% - 96%)    General: Elderly man AOX1.  Confused, minimally verbal.  NAD    Palliative Performance Scale/Karnofsky Score: 40%  ECOG Performance: n/a    HEENT: temporal wasting, edentulous, neck supple  Lungs: unlabored on RA  CV: RRR, S1S2  GI: soft non distended non tender on palpation incontinent  : incontinent  Musculoskeletal: bedbound, no edema  Skin: Stage 1 Pressure Injury to the Coccyx and Bilateral Heels  Neuro: able to follow simple commands  Oral intake ability: moderate po intake    LABS:                        11.9   10.11 )-----------( 161      ( 2022 06:30 )             36.2         138  |  105  |  8   ----------------------------<  88  3.9   |  29  |  0.52    Ca    9.0      2022 06:30      < from: CT Angio Abdomen and Pelvis w/ IV Cont (22 @ 14:51) >    ACC: [id number] EXAM:  CT ANGIO ABD PELV (W)AW IC                          PROCEDURE DATE:  2022          INTERPRETATION:  CLINICAL INFORMATION: Abdominal aortic aneurysm without   rupture    COMPARISON: CT abdomen pelvis 3/31/2022    CONTRAST/COMPLICATIONS:  IV Contrast: Omnipaque 350  90 cc administered   10 cc discarded  Oral Contrast: NONE  Complications: None reported at time of study completion    PROCEDURE:  CT Angiography of the Abdomen and Pelvis.  Arterial phase images were acquired.  Sagittal and coronal reformats were performed as well as 3D (MIP)   reconstructions.    FINDINGS:  LOWER CHEST: Small bilateral pleural effusions and bibasilar atelectasis.    LIVER: Normal.  BILE DUCTS: Nondilated.  GALLBLADDER: Normal.  SPLEEN:Normal.  PANCREAS: Normal.  ADRENALS: Mild bilateral thickening.  KIDNEYS/URETERS: No calculi, hydronephrosis, or soft tissue attenuating   mass.  BLADDER: Normal.  REPRODUCTIVE ORGANS: Nonenlarged.    BOWEL: Persistent left-sided colitis, improved. No obstruction.  PERITONEUM: No free air or ascites.    VESSELS: Fusiform infrarenal abdominal aortic aneurysm measures 6.7 x 6.4   cm, previously 6.3 x 6.2 cm in 2020. There is increase in eccentric   mural thrombus. Angulated neck morphology with approximately 1.7 cm   distance to the lowest left renal artery.    Mild bilateral iliac tortuosity. Widely patent iliac runoff to the   bilateral proximal femorals. 2.0 cm bilateral common iliac artery   ectasia. Celiac artery is widely patent. Replaced left hepatic artery   arising from the left gastric artery. SMA is widely patent. DANIELLA is   patent. Both renal arteries are single and normal caliber patent.    RETROPERITONEUM/LYMPH NODES: No adenopathy or hematoma.  ABDOMINAL WALL: Normal.  BONES: No aggressive lesion.    IMPRESSION:  *  Fusiform infrarenal abdominal aortic aneurysm measures 6.7 x 6.4 cm,   previously 6.3 x 6.2 cm in 2020.  *  Improving left-sided colitis.    < end of copied text >      RADIOLOGY & ADDITIONAL STUDIES: reviewed  ADVANCED DIRECTIVES: DNR/DNI

## 2022-04-06 ENCOUNTER — TRANSCRIPTION ENCOUNTER (OUTPATIENT)
Age: 87
End: 2022-04-06

## 2022-04-06 VITALS
HEART RATE: 72 BPM | DIASTOLIC BLOOD PRESSURE: 58 MMHG | RESPIRATION RATE: 18 BRPM | SYSTOLIC BLOOD PRESSURE: 120 MMHG | TEMPERATURE: 96 F | OXYGEN SATURATION: 100 %

## 2022-04-06 LAB
GLUCOSE BLDC GLUCOMTR-MCNC: 105 MG/DL — HIGH (ref 70–99)
GLUCOSE BLDC GLUCOMTR-MCNC: 114 MG/DL — HIGH (ref 70–99)
GLUCOSE BLDC GLUCOMTR-MCNC: 87 MG/DL — SIGNIFICANT CHANGE UP (ref 70–99)

## 2022-04-06 RX ORDER — ALBUTEROL 90 UG/1
2 AEROSOL, METERED ORAL
Qty: 0 | Refills: 0 | DISCHARGE
Start: 2022-04-06

## 2022-04-06 RX ORDER — CEFEPIME 1 G/1
1000 INJECTION, POWDER, FOR SOLUTION INTRAMUSCULAR; INTRAVENOUS
Qty: 0 | Refills: 0 | DISCHARGE
Start: 2022-04-06 | End: 2022-04-07

## 2022-04-06 RX ORDER — LEVETIRACETAM 250 MG/1
5 TABLET, FILM COATED ORAL
Qty: 0 | Refills: 0 | DISCHARGE
Start: 2022-04-06

## 2022-04-06 RX ORDER — SENNA PLUS 8.6 MG/1
2 TABLET ORAL
Qty: 0 | Refills: 0 | DISCHARGE
Start: 2022-04-06

## 2022-04-06 RX ORDER — SODIUM CHLORIDE 9 MG/ML
500 INJECTION INTRAMUSCULAR; INTRAVENOUS; SUBCUTANEOUS ONCE
Refills: 0 | Status: DISCONTINUED | OUTPATIENT
Start: 2022-04-06 | End: 2022-04-06

## 2022-04-06 RX ORDER — FERROUS SULFATE 325(65) MG
1 TABLET ORAL
Qty: 0 | Refills: 0 | DISCHARGE

## 2022-04-06 RX ORDER — INSULIN LISPRO 100/ML
0 VIAL (ML) SUBCUTANEOUS
Qty: 0 | Refills: 0 | DISCHARGE
Start: 2022-04-06

## 2022-04-06 RX ORDER — INSULIN LISPRO 100/ML
1 VIAL (ML) SUBCUTANEOUS
Qty: 0 | Refills: 0 | DISCHARGE
Start: 2022-04-06

## 2022-04-06 RX ORDER — LEVETIRACETAM 250 MG/1
1 TABLET, FILM COATED ORAL
Qty: 0 | Refills: 0 | DISCHARGE

## 2022-04-06 RX ORDER — LANOLIN ALCOHOL/MO/W.PET/CERES
1 CREAM (GRAM) TOPICAL
Qty: 0 | Refills: 0 | DISCHARGE
Start: 2022-04-06

## 2022-04-06 RX ORDER — POLYETHYLENE GLYCOL 3350 17 G/17G
17 POWDER, FOR SOLUTION ORAL
Qty: 0 | Refills: 0 | DISCHARGE
Start: 2022-04-06

## 2022-04-06 RX ORDER — SOD SULF/SODIUM/NAHCO3/KCL/PEG
4000 SOLUTION, RECONSTITUTED, ORAL ORAL ONCE
Refills: 0 | Status: DISCONTINUED | OUTPATIENT
Start: 2022-04-06 | End: 2022-04-06

## 2022-04-06 RX ORDER — ACETAMINOPHEN 500 MG
2 TABLET ORAL
Qty: 0 | Refills: 0 | DISCHARGE

## 2022-04-06 RX ORDER — ACETAMINOPHEN 500 MG
2 TABLET ORAL
Qty: 0 | Refills: 0 | DISCHARGE
Start: 2022-04-06

## 2022-04-06 RX ORDER — ONDANSETRON 8 MG/1
0 TABLET, FILM COATED ORAL
Qty: 0 | Refills: 0 | DISCHARGE
Start: 2022-04-06

## 2022-04-06 RX ADMIN — CEFEPIME 100 MILLIGRAM(S): 1 INJECTION, POWDER, FOR SOLUTION INTRAMUSCULAR; INTRAVENOUS at 17:35

## 2022-04-06 RX ADMIN — LEVETIRACETAM 420 MILLIGRAM(S): 250 TABLET, FILM COATED ORAL at 17:40

## 2022-04-06 RX ADMIN — POLYETHYLENE GLYCOL 3350 17 GRAM(S): 17 POWDER, FOR SOLUTION ORAL at 17:37

## 2022-04-06 RX ADMIN — Medication 4000 MILLILITER(S): at 17:41

## 2022-04-06 RX ADMIN — POLYETHYLENE GLYCOL 3350 17 GRAM(S): 17 POWDER, FOR SOLUTION ORAL at 05:15

## 2022-04-06 RX ADMIN — PANTOPRAZOLE SODIUM 40 MILLIGRAM(S): 20 TABLET, DELAYED RELEASE ORAL at 12:33

## 2022-04-06 RX ADMIN — CHLORHEXIDINE GLUCONATE 1 APPLICATION(S): 213 SOLUTION TOPICAL at 05:14

## 2022-04-06 RX ADMIN — CEFEPIME 100 MILLIGRAM(S): 1 INJECTION, POWDER, FOR SOLUTION INTRAMUSCULAR; INTRAVENOUS at 05:14

## 2022-04-06 RX ADMIN — LEVETIRACETAM 420 MILLIGRAM(S): 250 TABLET, FILM COATED ORAL at 05:14

## 2022-04-06 NOTE — DISCHARGE NOTE NURSING/CASE MANAGEMENT/SOCIAL WORK - PATIENT PORTAL LINK FT
You can access the FollowMyHealth Patient Portal offered by [facility] by registering at the following website: [email or web address]. By joining [employer]’s FollowMyHealth portal, you will also be able to view your health information using other applications (apps) compatible with our system.

## 2022-04-06 NOTE — PROGRESS NOTE ADULT - CONSTITUTIONAL DETAILS
no distress/cachectic
no distress
no distress/cachectic
no distress
no distress/cachectic
no distress/cachectic

## 2022-04-06 NOTE — PROGRESS NOTE ADULT - PROBLEM SELECTOR PLAN 3
likely due to colitis vs PNA  CT chest/abd/pelvis results as above , shows few GGO /cluster/tree-in-bud nodularity of RUL/RML. Pan-proctocolitis.   now improved  Continue Cefepime   blood and urine cultures negative  CAROLE carrillo
likely due to colitis vs PNA  CT chest/abd/pelvis results as above , shows few GGO /cluster/tree-in-bud nodularity of RUL/RML. Pan-proctocolitis.   now resolved   Continue Cefepime until 4/7   blood and urine cultures negative  CAROLE carrillo
likely due to colitis vs PNA  CT chest/abd/pelvis results as above , shows few GGO /cluster/tree-in-bud nodularity of RUL/RML. Pan-proctocolitis.   now resolved   Continue Cefepime until 4/7   blood and urine cultures negative  ID Dr. Monson
CT abd/pelvis results as above, shows pan-proctocolitis, large fecal load of rectum.   Continue abx per ID  F/u Cdiff   GI following.   F/u ID consult

## 2022-04-06 NOTE — PROGRESS NOTE ADULT - PROBLEM SELECTOR PLAN 2
CT chest results as above shows few GGO/cluster/tree-in-bud right lung.   Continue Cefepime   Will add albuterol inhaler prn  Monitor oxygenation   Aspiration precautions
CT abd/pelvis results as above, shows pan-proctocolitis, large fecal load of rectum.   see abdominal x-ray as above  continue bowel regimen   repeat enema + Golytely   Continue cefepime until 4/7  GI Dr. Ibrahim   ID Dr. Ham
CT abd/pelvis results as above, shows pan-proctocolitis, large fecal load of rectum.   continue bowel regimen   S/P enema + Golytely   successful BM  Continue abx per ID  GI + ID follows
CT abd/pelvis results as above, shows pan-proctocolitis, large fecal load of rectum.   continue bowel regimen   S/P enema + Golytely today   Continue abx per ID  GI + ID follows

## 2022-04-06 NOTE — PROGRESS NOTE ADULT - SUBJECTIVE AND OBJECTIVE BOX
NP Note discussed with  Primary Attending    Patient is a 86y old  Male who presents with a chief complaint of Encephalopathy, Hypotension (06 Apr 2022 12:18)      INTERVAL HPI/OVERNIGHT EVENTS: Patient seen and examined at bedside.     MEDICATIONS  (STANDING):  cefepime   IVPB      cefepime   IVPB 1000 milliGRAM(s) IV Intermittent every 12 hours  chlorhexidine 2% Cloths 1 Application(s) Topical <User Schedule>  dextrose 5% + sodium chloride 0.9%. 1000 milliLiter(s) (75 mL/Hr) IV Continuous <Continuous>  dextrose 50% Injectable 25 Gram(s) IV Push once  glucagon  Injectable 1 milliGRAM(s) IntraMuscular once  insulin lispro (ADMELOG) corrective regimen sliding scale   SubCutaneous every 6 hours  levETIRAcetam  IVPB 500 milliGRAM(s) IV Intermittent every 12 hours  pantoprazole  Injectable 40 milliGRAM(s) IV Push daily  polyethylene glycol 3350 17 Gram(s) Oral two times a day  polyethylene glycol/electrolyte Solution. 4000 milliLiter(s) Oral once  polyethylene glycol/electrolyte Solution. 4000 milliLiter(s) Oral once  senna 2 Tablet(s) Oral at bedtime    MEDICATIONS  (PRN):  acetaminophen     Tablet .. 650 milliGRAM(s) Oral every 6 hours PRN Temp greater or equal to 38C (100.4F), Mild Pain (1 - 3)  ALBUTerol    90 MICROgram(s) HFA Inhaler 2 Puff(s) Inhalation every 6 hours PRN Shortness of Breath and/or Wheezing  aluminum hydroxide/magnesium hydroxide/simethicone Suspension 30 milliLiter(s) Oral every 4 hours PRN Dyspepsia  melatonin 3 milliGRAM(s) Oral at bedtime PRN Insomnia  ondansetron Injectable 4 milliGRAM(s) IV Push every 8 hours PRN Nausea and/or Vomiting      __________________________________________________  REVIEW OF SYSTEMS:    unable to assess, poor historian      Vital Signs Last 24 Hrs  T(C): 35.6 (06 Apr 2022 13:53), Max: 37.3 (05 Apr 2022 20:46)  T(F): 96 (06 Apr 2022 13:53), Max: 99.1 (05 Apr 2022 20:46)  HR: 72 (06 Apr 2022 13:53) (72 - 78)  BP: 120/58 (06 Apr 2022 13:53) (119/63 - 132/60)  BP(mean): --  RR: 18 (06 Apr 2022 13:53) (16 - 18)  SpO2: 100% (06 Apr 2022 13:53) (95% - 100%)    ________________________________________________  PHYSICAL EXAM:  GENERAL: confused, non-verbal   HEENT: Normocephalic;  conjunctivae and sclerae clear; moist mucous membranes;   NECK : supple  CHEST/LUNG: bibasilar diminished   HEART: S1 S2  regular; no murmurs, gallops or rubs  ABDOMEN: Soft, Nontender, Nondistended; Bowel sounds present  EXTREMITIES: no cyanosis; no edema; no calf tenderness  SKIN: Pressure injury stage coccyx, bilateral heels  NERVOUS SYSTEM:  Awake, confused, non-verbal    _________________________________________________  LABS:                        11.9   10.11 )-----------( 161      ( 05 Apr 2022 06:30 )             36.2     04-05    138  |  105  |  8   ----------------------------<  88  3.9   |  29  |  0.52    Ca    9.0      05 Apr 2022 06:30          CAPILLARY BLOOD GLUCOSE      POCT Blood Glucose.: 105 mg/dL (06 Apr 2022 11:32)  POCT Blood Glucose.: 87 mg/dL (06 Apr 2022 05:25)  POCT Blood Glucose.: 98 mg/dL (05 Apr 2022 23:38)  POCT Blood Glucose.: 99 mg/dL (05 Apr 2022 16:59)        RADIOLOGY & ADDITIONAL TESTS:    Imaging  Reviewed:  YES/NO    Consultant(s) Notes Reviewed:   YES/ No      Plan of care was discussed with patient and /or primary care giver; all questions and concerns were addressed

## 2022-04-06 NOTE — PROGRESS NOTE ADULT - ASSESSMENT
85 y/o male, baseline AA0x1 from [facility] with PMHX of HTN, T2DM, PVOD, COPD, AAA (5.2cm), Tuberculosis, Seizure disorder, Anxiety, Vascular Dementia, Prior SAH/SDH, Iron Deficiency Anemia, Hypothyroidism, and Urinary Incontinence presented from [facility] with complaints of melena. Admitted for severe sepsis of unknown source also with "melena" requiring 2U PRBCs GI Julianyan consulted. Started on Cefepime IV complete on 4/7. BC/UC NGTD- ID sakshi follows. CT scan found Pan-proctocolitis. Large fecal load of rectum. and incidental findings of enlarging 6.4 x 6.2 cm infrarenal abdominal aortic aneurysm, vascular SX consulted and patient found to be poor surgical candidate. Palliative consulted and discussed with family, no further surgical intervention. Abdominal x-ray performed resulting Mild colonic stool load. Nonobstructive bowel gas pattern. Constipation treated with tap water enema and Golytely.

## 2022-04-06 NOTE — PROGRESS NOTE ADULT - PROBLEM SELECTOR PROBLEM 4
Pneumonia involving right lung
GI bleed
Pneumonia involving right lung
Pneumonia involving right lung

## 2022-04-06 NOTE — PROGRESS NOTE ADULT - MS EXT PE MLT D E PC
no clubbing/no cyanosis
no cyanosis/no pedal edema
no clubbing/no cyanosis
no cyanosis/no pedal edema
no clubbing/no cyanosis
no clubbing/no cyanosis

## 2022-04-06 NOTE — PROGRESS NOTE ADULT - PROBLEM SELECTOR PLAN 5
s/p 2 units PRBC   H&H stable  FOBT negative  No evidence of acute GIB  CT abd result as above shows pan-proctocolitis  Continue PPI daily  Avoid NSAIDs  Monitor CBC and transfuse for Hgb < 7  GI Dr. Ibrahim following

## 2022-04-06 NOTE — PROGRESS NOTE ADULT - REASON FOR ADMISSION
Encephalopathy, Hypotension

## 2022-04-06 NOTE — PROGRESS NOTE ADULT - PROBLEM SELECTOR PROBLEM 3
Colitis
Severe sepsis with lactic acidosis

## 2022-04-06 NOTE — PROGRESS NOTE ADULT - PROBLEM SELECTOR PLAN 4
presented with melena  s/p 2 units PRBC   H&H >12  FOBT negative  No evidence of acute GIB  CT abd result as above shows pan-proctocolitis  Continue PPI daily  Avoid NSAIDs  Monitor CBC and transfuse for Hgb < 7  GI Dr. Ibrahim following
CT chest results as above shows few GGO/cluster/tree-in-bud right lung.   Continue Cefepime   Will add albuterol inhaler prn  Monitor oxygenation   Aspiration precautions
CT chest results as above shows few GGO/cluster/tree-in-bud right lung.   Continue Cefepime until 4/7  Will add albuterol inhaler prn  Monitor oxygenation   Aspiration precautions
CT chest results as above shows few GGO/cluster/tree-in-bud right lung.   Continue Cefepime until 4/7  Will add albuterol inhaler prn  Monitor oxygenation   Aspiration precautions

## 2022-04-06 NOTE — PROGRESS NOTE ADULT - PROVIDER SPECIALTY LIST ADULT
Internal Medicine
Infectious Disease
Infectious Disease
Internal Medicine
Infectious Disease
Infectious Disease
Internal Medicine
Internal Medicine
Heme/Onc
Surgery
Internal Medicine
Internal Medicine
Gastroenterology

## 2022-04-06 NOTE — PROGRESS NOTE ADULT - SUBJECTIVE AND OBJECTIVE BOX
[   ] ICU                                          [   ] CCU                                      [ X  ] Medical Floor    Patient is a 86 year old male with anemia and melena. GI consulted to evaluate.        Patient is a 86 year old male, baseline AA0x1 and minimally ambulatory, with past medical history significant for HTN, T2DM, PVOD, COPD, AAA (5.2cm), Tuberculosis, Seizure disorder, Anxiety, Vascular Dementia, Prior SAH/SDH, Iron Deficiency Anemia, Hypothyroidism, and Urinary Incontinence presented from [facility] with complaints of melena. The patient was very altered while in the ED and unable to provide any history or ROS; collateral history was obtained from facility records and Sister (Mrs. Candelaria Martin), and niece (Mrs. Viviana Moon). They reported that they had just found out about the melena today, and that he has no prior history of GIB in the past. As per med rec from facility, patient is not on ASA, or any blood thinners. Patient  was noted to be hypotensive to 60/38, , temp 98.7, and saturating 96% on RA. Initial labs showed a lactate of 6.1, WBC of 57, and Hb of 12.7; Pt was transfused 2U PRBC in and given 3L isotonic fluids upon which his BP improved. ICU was consulted initially for septic shock +/- acute GIB but as patient was fluid responsive and regained some mental status, he was found to be stable enough to go to general floor for further workup.      Patient appears comfortable. No new complaints reported, No abdominal pain, N/V, hematemesis, hematochezia, melena, fever, chills, chest pain, SOB, cough or diarrhea reported.      PAIN MANAGEMENT:  Pain Scale:                 0/10  Pain Location:      Prior Colonoscopy:  No prior colonoscopy    PAST MEDICAL HISTORY   Seizure disorder  Depression  HTN (hypertension)  Osteoarthritis  Osteoporosis  Diabetes mellitus  DM  Vitamin D deficiency  CAD  PVD    Vascular dementia  COPD       PAST SURGICAL HISTORY  No significant past surgical history        Allergies    No Known Allergies    Intolerances  None       SOCIAL HISTORY  Advanced Directives:       [  ] Full Code       [ X ] DNR  Marital Status:         [  ] M      [ X ] S      [  ] D       [  ] W  Children:       [ X ] Yes      [  ] No  Occupation:        [  ] Employed       [ X ] Unemployed       [  ] Retired  Diet:       [ X ] Regular       [  ] PEG feeding          [  ] NG tube feeding  Drug Use:           [ X ] No             [  ] Yes  Alcohol:           [ X ] No             [  ] Yes (socially)         [  ] Yes (chronic)  Tobacco:           [  ] Yes           [ X ] No      FAMILY HISTORY  [ X ] Heart Disease            [ X ] Diabetes             [ X ] HTN             [  ] Colon Cancer             [  ] Stomach Cancer              [  ] Pancreatic Cancer      VITALS  Vital Signs Last 24 Hrs  T(C): 36.8 (06 Apr 2022 04:48), Max: 37.3 (05 Apr 2022 20:46)  T(F): 98.3 (06 Apr 2022 04:48), Max: 99.1 (05 Apr 2022 20:46)  HR: 78 (06 Apr 2022 04:48) (74 - 78)  BP: 132/60 (06 Apr 2022 04:48) (108/56 - 132/60)   RR: 16 (06 Apr 2022 04:48) (16 - 17)  SpO2: 95% (06 Apr 2022 04:48) (95% - 95%)       MEDICATIONS  (STANDING):  cefepime   IVPB      cefepime   IVPB 1000 milliGRAM(s) IV Intermittent every 12 hours  chlorhexidine 2% Cloths 1 Application(s) Topical <User Schedule>  dextrose 5% + sodium chloride 0.9%. 1000 milliLiter(s) (75 mL/Hr) IV Continuous <Continuous>  dextrose 50% Injectable 25 Gram(s) IV Push once  glucagon  Injectable 1 milliGRAM(s) IntraMuscular once  insulin lispro (ADMELOG) corrective regimen sliding scale   SubCutaneous every 6 hours  levETIRAcetam  IVPB 500 milliGRAM(s) IV Intermittent every 12 hours  pantoprazole  Injectable 40 milliGRAM(s) IV Push daily  polyethylene glycol 3350 17 Gram(s) Oral two times a day  polyethylene glycol/electrolyte Solution. 4000 milliLiter(s) Oral once  polyethylene glycol/electrolyte Solution. 4000 milliLiter(s) Oral once  senna 2 Tablet(s) Oral at bedtime    MEDICATIONS  (PRN):  acetaminophen     Tablet .. 650 milliGRAM(s) Oral every 6 hours PRN Temp greater or equal to 38C (100.4F), Mild Pain (1 - 3)  ALBUTerol    90 MICROgram(s) HFA Inhaler 2 Puff(s) Inhalation every 6 hours PRN Shortness of Breath and/or Wheezing  aluminum hydroxide/magnesium hydroxide/simethicone Suspension 30 milliLiter(s) Oral every 4 hours PRN Dyspepsia  melatonin 3 milliGRAM(s) Oral at bedtime PRN Insomnia  ondansetron Injectable 4 milliGRAM(s) IV Push every 8 hours PRN Nausea and/or Vomiting                            11.9   10.11 )-----------( 161      ( 05 Apr 2022 06:30 )             36.2       04-05    138  |  105  |  8   ----------------------------<  88  3.9   |  29  |  0.52    Ca    9.0      05 Apr 2022 06:30

## 2022-04-06 NOTE — PROGRESS NOTE ADULT - COMMENTS
Patient is unable to provide any history

## 2022-04-06 NOTE — PROGRESS NOTE ADULT - SUBJECTIVE AND OBJECTIVE BOX
86y Male is under our care for     REVIEW OF SYSTEMS:  [  ] Not able to elicit  General:	  Chest:	  GI:	  :  Skin:	  Musculoskeletal:	  Neuro:	    MEDS:  cefepime   IVPB      cefepime   IVPB 1000 milliGRAM(s) IV Intermittent every 12 hours    ALLERGIES: Allergies    No Known Allergies    Intolerances        VITALS:  Vital Signs Last 24 Hrs  T(C): 36.8 (06 Apr 2022 04:48), Max: 37.3 (05 Apr 2022 20:46)  T(F): 98.3 (06 Apr 2022 04:48), Max: 99.1 (05 Apr 2022 20:46)  HR: 78 (06 Apr 2022 04:48) (74 - 78)  BP: 132/60 (06 Apr 2022 04:48) (108/56 - 132/60)  BP(mean): --  RR: 16 (06 Apr 2022 04:48) (16 - 17)  SpO2: 95% (06 Apr 2022 04:48) (95% - 95%)      PHYSICAL EXAM:  HEENT:  Neck:  Respiratory:  Cardiovascular:  Gastrointestinal:  Extremities:  Skin:  Ortho:  Neuro:    LABS/DIAGNOSTIC TESTS:                        11.9   10.11 )-----------( 161      ( 05 Apr 2022 06:30 )             36.2     WBC Count: 10.11 K/uL (04-05 @ 06:30)  WBC Count: 7.75 K/uL (04-04 @ 07:19)  WBC Count: 10.88 K/uL (04-03 @ 05:54)  WBC Count: 23.08 K/uL (04-02 @ 06:08)    04-05    138  |  105  |  8   ----------------------------<  88  3.9   |  29  |  0.52    Ca    9.0      05 Apr 2022 06:30        CULTURES:   Clean Catch Clean Catch (Midstream)  04-01 @ 02:07   >=3 organisms. Probable collection contamination.  --  --      .Blood Blood  03-31 @ 21:02   No Growth Final  --  --        RADIOLOGY:  no new studies

## 2022-04-06 NOTE — PROGRESS NOTE ADULT - CVS HE PE MLT D E PC
regular rate and rhythm/no rub
regular rate and rhythm/no rub
regular rate and rhythm
regular rate and rhythm
regular rate and rhythm/no rub
regular rate and rhythm/no rub

## 2022-04-06 NOTE — PROGRESS NOTE ADULT - PROBLEM SELECTOR PROBLEM 1
Abdominal aortic aneurysm
Severe sepsis with lactic acidosis
Abdominal aortic aneurysm
Abdominal aortic aneurysm

## 2022-04-06 NOTE — PROGRESS NOTE ADULT - PROBLEM SELECTOR PLAN 1
likely due to colitis vs PNA  CT chest/abd/pelvis results as above , shows few GGO /cluster/tree-in-bud nodularity of RUL/RML. Pan-proctocolitis.   Lactate normalized  Afebrile, leukocytosis downtrending  Continue Cefepime   Continue hydration  F/u BC/UC  F/u Cdiff  F/u ID consult
CT abd/pelvis results noted , shows 6.4 x6.2 cm infrarenal abd aortic aneurysm, previously 6.1 cm x 6.1cm on 12/31/2020.  Maintain BP control   Vascular: poor surgical   Family does not want surgical intervention  Palliative consulted  Vascular consulted
CT abd/pelvis results noted , shows 6.4 x6.2 cm infrarenal abd aortic aneurysm, previously 6.1 cm x 6.1cm on 12/31/2020.  Maintain BP control   Vascular: "not a surgical candidate"   plan to discuss with family after repeat imaging
CT abd/pelvis results noted , shows 6.4 x6.2 cm infrarenal abd aortic aneurysm, previously 6.1 cm x 6.1cm on 12/31/2020.  Maintain BP control   Vascular: poor surgical   plan for palliative to discuss with family

## 2022-04-06 NOTE — DISCHARGE NOTE NURSING/CASE MANAGEMENT/SOCIAL WORK - NSDCPEFALRISK_GEN_ALL_CORE
For information on Fall & Injury Prevention, visit: [email or web address] OR  [email or web address] OR  https://www.cdc.gov/steadi/patient.html

## 2022-04-06 NOTE — PROGRESS NOTE ADULT - GASTROINTESTINAL DETAILS
soft/nontender/no distention/bowel sounds normal/no guarding/no rigidity
soft/nontender/no distention/no rebound tenderness/no guarding/no rigidity
soft/nontender/no distention/bowel sounds normal/no guarding/no rigidity
soft/nontender/no distention/no masses palpable/bowel sounds normal/no rebound tenderness/no guarding/no rigidity

## 2022-04-06 NOTE — PROGRESS NOTE ADULT - RS GEN PE MLT RESP DETAILS PC
breath sounds equal/good air movement/no rhonchi/no wheezes/rales
airway patent/breath sounds equal/good air movement/respirations non-labored/no rales/no rhonchi/no wheezes
breath sounds equal/good air movement/no rhonchi/no wheezes/rales
airway patent/breath sounds equal/good air movement/respirations non-labored/no rhonchi/no wheezes
normal/airway patent/breath sounds equal/good air movement/respirations non-labored/clear to auscultation bilaterally/no rales/no rhonchi
airway patent/breath sounds equal/good air movement/respirations non-labored/clear to auscultation bilaterally/no rales/no rhonchi

## 2022-04-09 LAB — PTH RELATED PROT SERPL-MCNC: <2 PMOL/L — SIGNIFICANT CHANGE UP

## 2022-04-26 PROCEDURE — 85610 PROTHROMBIN TIME: CPT

## 2022-04-26 PROCEDURE — 84145 PROCALCITONIN (PCT): CPT

## 2022-04-26 PROCEDURE — 86900 BLOOD TYPING SEROLOGIC ABO: CPT

## 2022-04-26 PROCEDURE — 74176 CT ABD & PELVIS W/O CONTRAST: CPT

## 2022-04-26 PROCEDURE — 74019 RADEX ABDOMEN 2 VIEWS: CPT

## 2022-04-26 PROCEDURE — 86850 RBC ANTIBODY SCREEN: CPT

## 2022-04-26 PROCEDURE — 83519 RIA NONANTIBODY: CPT

## 2022-04-26 PROCEDURE — 87086 URINE CULTURE/COLONY COUNT: CPT

## 2022-04-26 PROCEDURE — 96365 THER/PROPH/DIAG IV INF INIT: CPT

## 2022-04-26 PROCEDURE — 36415 COLL VENOUS BLD VENIPUNCTURE: CPT

## 2022-04-26 PROCEDURE — P9040: CPT

## 2022-04-26 PROCEDURE — 86923 COMPATIBILITY TEST ELECTRIC: CPT

## 2022-04-26 PROCEDURE — 82330 ASSAY OF CALCIUM: CPT

## 2022-04-26 PROCEDURE — 87641 MR-STAPH DNA AMP PROBE: CPT

## 2022-04-26 PROCEDURE — 93005 ELECTROCARDIOGRAM TRACING: CPT

## 2022-04-26 PROCEDURE — 85025 COMPLETE CBC W/AUTO DIFF WBC: CPT

## 2022-04-26 PROCEDURE — 85730 THROMBOPLASTIN TIME PARTIAL: CPT

## 2022-04-26 PROCEDURE — 83735 ASSAY OF MAGNESIUM: CPT

## 2022-04-26 PROCEDURE — 80053 COMPREHEN METABOLIC PANEL: CPT

## 2022-04-26 PROCEDURE — 87640 STAPH A DNA AMP PROBE: CPT

## 2022-04-26 PROCEDURE — 80048 BASIC METABOLIC PNL TOTAL CA: CPT

## 2022-04-26 PROCEDURE — 83690 ASSAY OF LIPASE: CPT

## 2022-04-26 PROCEDURE — 87493 C DIFF AMPLIFIED PROBE: CPT

## 2022-04-26 PROCEDURE — 82652 VIT D 1 25-DIHYDROXY: CPT

## 2022-04-26 PROCEDURE — 82310 ASSAY OF CALCIUM: CPT

## 2022-04-26 PROCEDURE — 81001 URINALYSIS AUTO W/SCOPE: CPT

## 2022-04-26 PROCEDURE — 36430 TRANSFUSION BLD/BLD COMPNT: CPT

## 2022-04-26 PROCEDURE — 87040 BLOOD CULTURE FOR BACTERIA: CPT

## 2022-04-26 PROCEDURE — 74174 CTA ABD&PLVS W/CONTRAST: CPT

## 2022-04-26 PROCEDURE — 82272 OCCULT BLD FECES 1-3 TESTS: CPT

## 2022-04-26 PROCEDURE — 71045 X-RAY EXAM CHEST 1 VIEW: CPT

## 2022-04-26 PROCEDURE — 99285 EMERGENCY DEPT VISIT HI MDM: CPT

## 2022-04-26 PROCEDURE — 82962 GLUCOSE BLOOD TEST: CPT

## 2022-04-26 PROCEDURE — 83970 ASSAY OF PARATHORMONE: CPT

## 2022-04-26 PROCEDURE — 85027 COMPLETE CBC AUTOMATED: CPT

## 2022-04-26 PROCEDURE — 71250 CT THORAX DX C-: CPT

## 2022-04-26 PROCEDURE — 87635 SARS-COV-2 COVID-19 AMP PRB: CPT

## 2022-04-26 PROCEDURE — 83605 ASSAY OF LACTIC ACID: CPT

## 2022-04-26 PROCEDURE — 86901 BLOOD TYPING SEROLOGIC RH(D): CPT

## 2022-04-27 NOTE — PATIENT PROFILE ADULT - INFORMATION COULD NOT BE OBTAINED DETAILS
Mirtazapine: 8/30/21    Future appt: Your appointments     Date & Time Appointment Department [facility])    Jun 11, 2022  8:00 AM CDT Laboratory Visit with [facility] ([facility] [place])        Jun 29, 2022  3:00 PM CDT Medicare Annual Well Visit with Flores Dunne MD [facility] [place]note 2[facility] ([facility])        Sep 28, 2022  3:30 PM CDT Sleep Follow Up with Chance Bowens, [address] [place] [address] ([facility])            [address] [facility]  [id number]  [address] [facility] [place]  [facility] [address]  [phone]        Last Appointment with provider:   12/29/2021  Last appointment at [facility]:  3/30/2022  Cholesterol, Total (mg/dL)   Date Value   06/09/2018 163     HDL Cholesterol (mg/dL)   Date Value   06/09/2018 63     LDL Cholesterol (mg/dL)   Date Value   06/09/2018 81     Triglycerides (mg/dL)   Date Value   06/09/2018 94     No results found for: EAG, A1C  Lab Results   Component Value Date    TSH 2.990 09/18/2021       No follow-ups on file. Pt confused, unable to answer.

## 2022-10-05 NOTE — PROGRESS NOTE ADULT - PROBLEM/PLAN-1
Received call from [place] at [facility], caller not on line. Complaint: nosebleeds    Practice Name: Stephany    Caller's telephone number verified as [phone]    Connected with caller via phone, please see below triage        Received call from [place] at [facility] Alexia Felton at [facility] for appointment scheduling    Attention Provider: Thank you for allowing me to participate in the care of your patient. The patient was connected to triage in response to information provided to the [facility]. Please do not respond through this encounter as the response is not directed to a shared pool.         Reason for Disposition   Caller wants child seen for non-urgent problem    Protocols used: Nosebleed-PEDIATRIC-[place]
DISPLAY PLAN FREE TEXT

## 2022-11-16 NOTE — PHYSICAL THERAPY INITIAL EVALUATION ADULT - PRECAUTIONS/LIMITATIONS, REHAB EVAL
[facility] stated that they have received the request and they are working on getting the records to us.  fall precautions

## 2023-01-30 NOTE — ACUTE INTERFACILITY TRANSFER NOTE - NS AS DC FOLLOWUP STROKE INST
67M PMH MS, UC, GERD, BPH, panic disorder presents to hospital with nausea and found to have new AHRF. 67M PMH MS, UC, GERD, BPH, panic disorder presents to hospital with nausea and found to have new AHRF now resolved. Enoxaparin/Lovenox

## 2023-07-03 NOTE — PATIENT PROFILE ADULT - FUNCTIONAL ASSESSMENT - DAILY ACTIVITY 5.
Returned the patient's phone call in regards to their message. Informed the patient that they have the soonest appointment with Daly on 07/20/23. Informed the patient that I can send a message to [facility] to see if there is anything else we can do. Informed the patient that [facility] is out of the office today and out of the office tomorrow. Informed the patient that [facility] will be back in the office on Wednesday and I will give them a call back. Patient verbalized understanding.         ----- Message from Ethan Ospina sent at 7/3/2023  3:07 PM CDT -----      Name of Who is Calling:PT          What is the request in detail:PT is requesting a call back to discuss her being in extreme pain from her knee on down, PT states she feels like a hot liquid is running down her left leg.          Can the clinic reply by [facility]:no          What Number to Call Back if not in [phone]     1 = Total assistance

## 2023-07-18 NOTE — ED ADULT NURSE NOTE - NS ED NOTE ABUSE RESPONSE YN
Yes
Assistance with ambulation/Assistance OOB with selected safe patient handling equipment/Communicate Risk of Fall with Harm to all staff/Monitor gait and stability/Reinforce activity limits and safety measures with patient and family/Tailored Fall Risk Interventions/Visual Cue: Yellow wristband and red socks/Bed in lowest position, wheels locked, appropriate side rails in place/Call bell, personal items and telephone in reach/Instruct patient to call for assistance before getting out of bed or chair/Non-slip footwear when patient is out of bed/[place] to call system/Physically safe environment - no spills, clutter or unnecessary equipment/Purposeful Proactive Rounding/Room/bathroom lighting operational, light cord in reach

## 2023-11-29 NOTE — PROGRESS NOTE ADULT - PROBLEM SELECTOR PROBLEM 10
Tetracycline Pregnancy And Lactation Text: This medication is Pregnancy Category D and not consider safe during pregnancy. It is also excreted in breast milk. History of COPD

## 2024-03-04 NOTE — PROGRESS NOTE ADULT - PROBLEM SELECTOR PLAN 8
No
The patient has documented history of COPD and TB, but not displaying any signs of respiratory distress currently.

## 2025-07-23 NOTE — H&P ADULT - PROBLEM/PLAN-6
Current patient location: [address] [place] [id number]    Is the patient currently in the state of [place]? YES    Visit mode: VIDEO    If the visit is dropped, the patient can be reconnected by:VIDEO VISIT: Text to cell phone:   Telephone Information:   Mobile [phone]       Will anyone else be joining the visit? NO  (If patient encounters technical issues they should call [phone]    Are changes needed to the allergy or medication list? Pt stated no changes to allergies and Pt stated no med changes    Are refills needed on medications prescribed by this physician? Discuss with provider    Rooming Documentation:  Questionnaire(s) completed    Reason for visit: LAMBERTO GOYAL      DISPLAY PLAN FREE TEXT  used